# Patient Record
Sex: FEMALE | Race: WHITE | Employment: OTHER | URBAN - METROPOLITAN AREA
[De-identification: names, ages, dates, MRNs, and addresses within clinical notes are randomized per-mention and may not be internally consistent; named-entity substitution may affect disease eponyms.]

---

## 2017-02-07 ENCOUNTER — OFFICE VISIT (OUTPATIENT)
Dept: FAMILY MEDICINE CLINIC | Facility: CLINIC | Age: 72
End: 2017-02-07

## 2017-02-07 VITALS
OXYGEN SATURATION: 98 % | HEIGHT: 61 IN | DIASTOLIC BLOOD PRESSURE: 70 MMHG | SYSTOLIC BLOOD PRESSURE: 110 MMHG | BODY MASS INDEX: 33.61 KG/M2 | WEIGHT: 178 LBS | HEART RATE: 66 BPM

## 2017-02-07 DIAGNOSIS — M79.642 LEFT HAND PAIN: Primary | ICD-10-CM

## 2017-02-07 DIAGNOSIS — R19.6 HALITOSIS: ICD-10-CM

## 2017-02-07 PROBLEM — M81.0 OSTEOPOROSIS: Status: ACTIVE | Noted: 2017-02-07

## 2017-02-07 PROBLEM — I10 ESSENTIAL HYPERTENSION: Status: ACTIVE | Noted: 2017-02-07

## 2017-02-07 PROBLEM — H40.9 GLAUCOMA: Status: ACTIVE | Noted: 2017-02-07

## 2017-02-07 PROCEDURE — 99202 OFFICE O/P NEW SF 15 MIN: CPT

## 2017-02-07 RX ORDER — GABAPENTIN 300 MG/1
300 CAPSULE ORAL NIGHTLY
COMMUNITY
End: 2017-02-07

## 2017-02-07 RX ORDER — GABAPENTIN 400 MG/1
400 CAPSULE ORAL NIGHTLY
Qty: 90 CAPSULE | Refills: 1 | Status: SHIPPED | OUTPATIENT
Start: 2017-02-07 | End: 2017-05-03

## 2017-02-07 RX ORDER — METHYLPREDNISOLONE 4 MG/1
TABLET ORAL
Qty: 1 KIT | Refills: 0 | Status: SHIPPED | OUTPATIENT
Start: 2017-02-07 | End: 2017-04-11 | Stop reason: ALTCHOICE

## 2017-02-08 NOTE — PROGRESS NOTES
HPI:    Patient ID: Pao Renee is a 70year old female. Hand Pain   The pain is present in the left hand. This is a chronic problem. The current episode started more than 1 month ago. There has been no history of extremity trauma.  The proble hand: She exhibits tenderness (over 1st and 2nd metacarpals) and bony tenderness (over 1st and 2nd metacarpals). She exhibits normal range of motion. Normal strength noted. Neurological: She is alert and oriented to person, place, and time.    Skin: Skin

## 2017-04-11 ENCOUNTER — OFFICE VISIT (OUTPATIENT)
Dept: FAMILY MEDICINE CLINIC | Facility: CLINIC | Age: 72
End: 2017-04-11

## 2017-04-11 VITALS
SYSTOLIC BLOOD PRESSURE: 100 MMHG | OXYGEN SATURATION: 97 % | HEART RATE: 72 BPM | BODY MASS INDEX: 34 KG/M2 | WEIGHT: 181 LBS | DIASTOLIC BLOOD PRESSURE: 70 MMHG

## 2017-04-11 DIAGNOSIS — N30.01 ACUTE CYSTITIS WITH HEMATURIA: Primary | ICD-10-CM

## 2017-04-11 DIAGNOSIS — H40.9 GLAUCOMA OF BOTH EYES, UNSPECIFIED GLAUCOMA: ICD-10-CM

## 2017-04-11 DIAGNOSIS — Z01.89 ENCOUNTER FOR ROUTINE LABORATORY TESTING: ICD-10-CM

## 2017-04-11 DIAGNOSIS — M81.0 OSTEOPOROSIS WITHOUT CURRENT PATHOLOGICAL FRACTURE, UNSPECIFIED OSTEOPOROSIS TYPE: ICD-10-CM

## 2017-04-11 DIAGNOSIS — M54.9 CHRONIC UPPER BACK PAIN: ICD-10-CM

## 2017-04-11 DIAGNOSIS — I10 ESSENTIAL HYPERTENSION: ICD-10-CM

## 2017-04-11 DIAGNOSIS — G89.29 CHRONIC UPPER BACK PAIN: ICD-10-CM

## 2017-04-11 DIAGNOSIS — R30.0 DYSURIA: ICD-10-CM

## 2017-04-11 PROBLEM — R19.6 HALITOSIS: Status: RESOLVED | Noted: 2017-02-07 | Resolved: 2017-04-11

## 2017-04-11 PROBLEM — M79.642 LEFT HAND PAIN: Status: RESOLVED | Noted: 2017-02-07 | Resolved: 2017-04-11

## 2017-04-11 PROCEDURE — 87186 SC STD MICRODIL/AGAR DIL: CPT

## 2017-04-11 PROCEDURE — 87086 URINE CULTURE/COLONY COUNT: CPT

## 2017-04-11 PROCEDURE — 99214 OFFICE O/P EST MOD 30 MIN: CPT

## 2017-04-11 PROCEDURE — 87077 CULTURE AEROBIC IDENTIFY: CPT

## 2017-04-11 RX ORDER — OMEPRAZOLE 40 MG/1
40 CAPSULE, DELAYED RELEASE ORAL DAILY
COMMUNITY
End: 2017-05-03

## 2017-04-11 RX ORDER — ALENDRONATE SODIUM 10 MG/1
10 TABLET ORAL
Qty: 90 TABLET | Refills: 3 | Status: SHIPPED | OUTPATIENT
Start: 2017-04-11 | End: 2018-02-26

## 2017-04-11 RX ORDER — LATANOPROST 50 UG/ML
1 SOLUTION/ DROPS OPHTHALMIC NIGHTLY
Qty: 3 BOTTLE | Refills: 3 | Status: SHIPPED | OUTPATIENT
Start: 2017-04-11 | End: 2018-02-27

## 2017-04-11 RX ORDER — LISINOPRIL 30 MG/1
30 TABLET ORAL DAILY
Qty: 90 TABLET | Refills: 0 | Status: SHIPPED | OUTPATIENT
Start: 2017-04-11 | End: 2017-05-03

## 2017-04-11 RX ORDER — NAPROXEN 500 MG/1
500 TABLET ORAL 2 TIMES DAILY WITH MEALS
Qty: 180 TABLET | Refills: 1 | Status: SHIPPED | OUTPATIENT
Start: 2017-04-11 | End: 2019-09-06

## 2017-04-11 RX ORDER — CIPROFLOXACIN 250 MG/1
250 TABLET, FILM COATED ORAL 2 TIMES DAILY
Qty: 6 TABLET | Refills: 0 | Status: SHIPPED | OUTPATIENT
Start: 2017-04-11 | End: 2017-04-14

## 2017-04-11 RX ORDER — CIPROFLOXACIN 250 MG/1
250 TABLET, FILM COATED ORAL 2 TIMES DAILY
Qty: 6 TABLET | Refills: 0 | Status: SHIPPED | OUTPATIENT
Start: 2017-04-11 | End: 2017-04-11

## 2017-04-12 ENCOUNTER — TELEPHONE (OUTPATIENT)
Dept: FAMILY MEDICINE CLINIC | Facility: CLINIC | Age: 72
End: 2017-04-12

## 2017-04-12 ENCOUNTER — NURSE ONLY (OUTPATIENT)
Dept: FAMILY MEDICINE CLINIC | Facility: CLINIC | Age: 72
End: 2017-04-12

## 2017-04-12 DIAGNOSIS — Z01.89 ENCOUNTER FOR ROUTINE LABORATORY TESTING: ICD-10-CM

## 2017-04-12 PROCEDURE — 80061 LIPID PANEL: CPT

## 2017-04-12 PROCEDURE — 36415 COLL VENOUS BLD VENIPUNCTURE: CPT

## 2017-04-12 PROCEDURE — 85027 COMPLETE CBC AUTOMATED: CPT

## 2017-04-12 PROCEDURE — 80053 COMPREHEN METABOLIC PANEL: CPT

## 2017-04-12 NOTE — PROGRESS NOTES
Patient presented to clinic for blood work. Orders verified in patient's chart. Name and  verified. Patient is fasting. Blood drawn from left antecubital, tolerated well without complications.

## 2017-04-12 NOTE — PROGRESS NOTES
HPI:    Patient ID: Raleigh Anguiano is a 70year old female. Dysuria   This is a new problem. Episode onset: 1 week ago. The problem occurs every urination. The problem has been unchanged. The quality of the pain is described as burning.  The pain myalgias and back pain. Skin: Negative for rash. Neurological: Negative for dizziness, tingling, syncope, weakness, light-headedness, numbness, headaches and paresthesias. All other systems reviewed and are negative.              Current Outpatient Pr relief. Rx for Cipro given. 3. Chronic upper back pain  Pt advised to take hot showers/baths and/or to apply a heating pad to affected area to help relieve her symptoms. Rx for Naproxen given. 4. Essential hypertension  Refill for Lisinopril given.

## 2017-04-12 NOTE — TELEPHONE ENCOUNTER
Patient was seen by Dr Clay Wagoner last month and lost the RX, ok per Dr Jose Alfredo Winn to send it this time only and if she needs additional refills she will have to call Dr Clay Wagoner.

## 2017-04-24 ENCOUNTER — TELEPHONE (OUTPATIENT)
Dept: FAMILY MEDICINE CLINIC | Facility: CLINIC | Age: 72
End: 2017-04-24

## 2017-05-02 ENCOUNTER — OFFICE VISIT (OUTPATIENT)
Dept: FAMILY MEDICINE CLINIC | Facility: CLINIC | Age: 72
End: 2017-05-02

## 2017-05-02 VITALS
HEART RATE: 67 BPM | HEIGHT: 61 IN | DIASTOLIC BLOOD PRESSURE: 60 MMHG | SYSTOLIC BLOOD PRESSURE: 120 MMHG | WEIGHT: 181 LBS | BODY MASS INDEX: 34.17 KG/M2 | RESPIRATION RATE: 18 BRPM | OXYGEN SATURATION: 98 %

## 2017-05-02 DIAGNOSIS — K21.00 GASTROESOPHAGEAL REFLUX DISEASE WITH ESOPHAGITIS: ICD-10-CM

## 2017-05-02 DIAGNOSIS — E66.09 NON MORBID OBESITY DUE TO EXCESS CALORIES: ICD-10-CM

## 2017-05-02 DIAGNOSIS — E78.00 HYPERCHOLESTEREMIA: ICD-10-CM

## 2017-05-02 DIAGNOSIS — Z13.31 DEPRESSION SCREENING: ICD-10-CM

## 2017-05-02 DIAGNOSIS — Z12.31 ENCOUNTER FOR SCREENING MAMMOGRAM FOR BREAST CANCER: ICD-10-CM

## 2017-05-02 DIAGNOSIS — Z00.00 ENCOUNTER FOR MEDICARE ANNUAL WELLNESS EXAM: Primary | ICD-10-CM

## 2017-05-02 DIAGNOSIS — H40.9 GLAUCOMA OF BOTH EYES, UNSPECIFIED GLAUCOMA: ICD-10-CM

## 2017-05-02 DIAGNOSIS — G89.29 CHRONIC UPPER BACK PAIN: ICD-10-CM

## 2017-05-02 DIAGNOSIS — M54.9 CHRONIC UPPER BACK PAIN: ICD-10-CM

## 2017-05-02 DIAGNOSIS — M81.0 AGE-RELATED OSTEOPOROSIS WITHOUT CURRENT PATHOLOGICAL FRACTURE: ICD-10-CM

## 2017-05-02 DIAGNOSIS — Z78.0 POSTMENOPAUSAL: ICD-10-CM

## 2017-05-02 DIAGNOSIS — I10 ESSENTIAL HYPERTENSION: ICD-10-CM

## 2017-05-02 DIAGNOSIS — Z28.21 IMMUNIZATION NOT CARRIED OUT BECAUSE OF PATIENT REFUSAL: ICD-10-CM

## 2017-05-02 DIAGNOSIS — R44.8 PARESTHESIA OF TONGUE: ICD-10-CM

## 2017-05-02 PROBLEM — R30.0 DYSURIA: Status: RESOLVED | Noted: 2017-04-11 | Resolved: 2017-05-02

## 2017-05-02 PROBLEM — N30.01 ACUTE CYSTITIS WITH HEMATURIA: Status: RESOLVED | Noted: 2017-04-11 | Resolved: 2017-05-02

## 2017-05-02 PROCEDURE — 96160 PT-FOCUSED HLTH RISK ASSMT: CPT

## 2017-05-02 RX ORDER — TIMOLOL MALEATE 5 MG/ML
SOLUTION/ DROPS OPHTHALMIC
COMMUNITY
Start: 2017-04-18 | End: 2017-05-03

## 2017-05-02 NOTE — PROGRESS NOTES
HPI:   Alix Whittington is a 70year old female who presents for a MA (Medicare Advantage) [facility] (Once per calendar year). Pt denies any complaints at this time.        Patient Care Team: Patient Care Team:  Tadeo Patterson MD as PCP - General includes Arthritis in her father; Cancer in her mother; Diabetes in her son; Musculo-skelatal Disorder in her son. SOCIAL HISTORY:   She  reports that she has never smoked.  She has never used smokeless tobacco. She reports that she does not drink alcohol symmetrical, trachea midline, no adenopathy;  thyroid: not enlarged, symmetric, no tenderness/mass/nodules; no carotid bruit or JVD   Back:   Symmetric, no curvature, ROM normal, no CVA tenderness   Lungs:   Clear to auscultation bilaterally, respirations previously instructed. - Follow up with Ophthalmology as scheduled. Gastroesophageal reflux disease with esophagitis  -     Pt advised to avoid excess intake of spicy/acidic/caffeinated foods.  - Omeprazole 40 MG Oral Capsule Delayed Release;  Take 1 ca HOANG and f/u.      Ifeoma Chandra MD, 5/2/2017     General Health     In the past six months, have you lost more than 10 pounds without trying?: 2 - No    Has your appetite been poor?: No    How does the patient maintain a good energy level?: Other    How wo hopeless (over the last two weeks)?: Several days    PHQ-2 SCORE: 2        Advance Directives     Do you have a healthcare power of ?: No    Do you have a living will?: No     Please go to \"Cognitive Assessment\" under Medicare Assessment section Health Maintenance if applicable    Chlamydia  Annually if high risk No results found for: CHLAMYDIA No flowsheet data found.     Screening Mammogram      Mammogram Annually to 76, then as discussed Mammogram,1 Yr due on 07/17/1985 Update Health Maintenance A1C No flowsheet data found. Creat/alb ratio  Annually      LDL  Annually LDL CHOLESTEROL (mg/dL)   Date Value   04/12/2017 141*    No flowsheet data found. Dilated Eye exam  Annually No flowsheet data found. No flowsheet data found.     COPD      S

## 2017-05-03 PROBLEM — K21.00 GASTROESOPHAGEAL REFLUX DISEASE WITH ESOPHAGITIS: Status: ACTIVE | Noted: 2017-05-03

## 2017-05-03 PROBLEM — E78.00 HYPERCHOLESTEREMIA: Status: ACTIVE | Noted: 2017-05-03

## 2017-05-03 PROBLEM — Z28.21 IMMUNIZATION NOT CARRIED OUT BECAUSE OF PATIENT REFUSAL: Status: ACTIVE | Noted: 2017-05-03

## 2017-05-03 PROBLEM — Z13.31 DEPRESSION SCREENING: Status: ACTIVE | Noted: 2017-05-03

## 2017-05-03 PROBLEM — Z00.00 ENCOUNTER FOR MEDICARE ANNUAL WELLNESS EXAM: Status: ACTIVE | Noted: 2017-04-11

## 2017-05-03 PROBLEM — Z78.0 POSTMENOPAUSAL: Status: ACTIVE | Noted: 2017-05-03

## 2017-05-03 PROBLEM — Z12.31 ENCOUNTER FOR SCREENING MAMMOGRAM FOR BREAST CANCER: Status: ACTIVE | Noted: 2017-05-03

## 2017-05-03 PROBLEM — E66.09 NON MORBID OBESITY DUE TO EXCESS CALORIES: Status: ACTIVE | Noted: 2017-05-03

## 2017-05-03 PROBLEM — R44.8 PARESTHESIA OF TONGUE: Status: ACTIVE | Noted: 2017-05-03

## 2017-05-03 RX ORDER — OMEPRAZOLE 40 MG/1
40 CAPSULE, DELAYED RELEASE ORAL DAILY
Qty: 90 CAPSULE | Refills: 1 | Status: SHIPPED | OUTPATIENT
Start: 2017-05-03 | End: 2018-05-16

## 2017-05-03 RX ORDER — GABAPENTIN 400 MG/1
400 CAPSULE ORAL NIGHTLY
Qty: 90 CAPSULE | Refills: 1 | Status: SHIPPED | OUTPATIENT
Start: 2017-05-03 | End: 2018-04-13 | Stop reason: ALTCHOICE

## 2017-05-03 RX ORDER — LISINOPRIL 30 MG/1
30 TABLET ORAL DAILY
Qty: 90 TABLET | Refills: 1 | Status: SHIPPED | OUTPATIENT
Start: 2017-05-03 | End: 2018-01-17

## 2017-05-10 ENCOUNTER — MED REC SCAN ONLY (OUTPATIENT)
Dept: FAMILY MEDICINE CLINIC | Facility: CLINIC | Age: 72
End: 2017-05-10

## 2017-05-11 ENCOUNTER — TELEPHONE (OUTPATIENT)
Dept: FAMILY MEDICINE CLINIC | Facility: CLINIC | Age: 72
End: 2017-05-11

## 2017-05-18 ENCOUNTER — OFFICE VISIT (OUTPATIENT)
Dept: FAMILY MEDICINE CLINIC | Facility: CLINIC | Age: 72
End: 2017-05-18

## 2017-05-18 VITALS
WEIGHT: 183 LBS | RESPIRATION RATE: 16 BRPM | HEIGHT: 61 IN | BODY MASS INDEX: 34.55 KG/M2 | OXYGEN SATURATION: 97 % | SYSTOLIC BLOOD PRESSURE: 120 MMHG | DIASTOLIC BLOOD PRESSURE: 70 MMHG | HEART RATE: 65 BPM

## 2017-05-18 DIAGNOSIS — N30.00 ACUTE CYSTITIS WITHOUT HEMATURIA: Primary | ICD-10-CM

## 2017-05-18 DIAGNOSIS — R30.0 DYSURIA: ICD-10-CM

## 2017-05-18 PROCEDURE — 87086 URINE CULTURE/COLONY COUNT: CPT

## 2017-05-18 PROCEDURE — 81003 URINALYSIS AUTO W/O SCOPE: CPT

## 2017-05-18 PROCEDURE — 99213 OFFICE O/P EST LOW 20 MIN: CPT

## 2017-05-18 RX ORDER — SULFAMETHOXAZOLE AND TRIMETHOPRIM 800; 160 MG/1; MG/1
1 TABLET ORAL 2 TIMES DAILY
Qty: 14 TABLET | Refills: 0 | Status: SHIPPED | OUTPATIENT
Start: 2017-05-18 | End: 2017-05-25

## 2017-05-18 NOTE — PATIENT INSTRUCTIONS
Cultivo de [place] otros nombres? Cultivo y sensibilidad (“C&S”, por nowak sigla en inglés) de la orina  ¿Qué es Radha análisis?   Vivi análisis comprueba si hay bacterias en nowak orina que pudieran estar causando heath infección en patricia vías Los resultados del cultivo de orina se indican en unidades formadores de colonias por mililitro (CFU/mL). Un resultado negativo significa que no tiene heath infección. Un recuento bacteriano más alto puede indicar heath infección.   ¿Cómo se realiza rolan anális

## 2017-05-18 NOTE — PROGRESS NOTES
HPI:    Patient ID: Renaldo Camara is a 70year old female. Dysuria   This is a new problem. The current episode started yesterday. The problem occurs every urination. Progression since onset: partially improved.  The quality of the pain is descr daily Disp: 3 Bottle Rfl: 0   naproxen 500 MG Oral Tab Take 1 tablet (500 mg total) by mouth 2 (two) times daily with meals.  Disp: 180 tablet Rfl: 1   Alendronate Sodium 10 MG Oral Tab Take 1 tablet (10 mg total) by mouth every morning before breakfast. Charli Saxena

## 2017-07-28 ENCOUNTER — TELEPHONE (OUTPATIENT)
Dept: FAMILY MEDICINE CLINIC | Facility: CLINIC | Age: 72
End: 2017-07-28

## 2017-07-28 DIAGNOSIS — H40.9 GLAUCOMA OF BOTH EYES, UNSPECIFIED GLAUCOMA TYPE: Primary | ICD-10-CM

## 2017-08-28 ENCOUNTER — TELEPHONE (OUTPATIENT)
Dept: FAMILY MEDICINE CLINIC | Facility: CLINIC | Age: 72
End: 2017-08-28

## 2017-08-28 RX ORDER — METHYLPREDNISOLONE 4 MG/1
TABLET ORAL
Qty: 1 KIT | Refills: 0 | Status: SHIPPED | OUTPATIENT
Start: 2017-08-28 | End: 2017-09-26 | Stop reason: ALTCHOICE

## 2017-08-28 NOTE — TELEPHONE ENCOUNTER
Patient has been having feet pain for the past three weeks. Wants to be seen this week. I advised the patient there was no available appointments until next week Friday. She declined the appointment.

## 2017-08-29 NOTE — TELEPHONE ENCOUNTER
Per Dr Kerline Mcknight have patient start her on a medrodose madi until we can see her next week, message was left on her V/M notifying her of Dr's instructions.

## 2017-09-26 ENCOUNTER — OFFICE VISIT (OUTPATIENT)
Dept: FAMILY MEDICINE CLINIC | Facility: CLINIC | Age: 72
End: 2017-09-26

## 2017-09-26 VITALS
BODY MASS INDEX: 34 KG/M2 | SYSTOLIC BLOOD PRESSURE: 100 MMHG | HEART RATE: 72 BPM | OXYGEN SATURATION: 98 % | DIASTOLIC BLOOD PRESSURE: 70 MMHG | WEIGHT: 179 LBS

## 2017-09-26 DIAGNOSIS — M25.571 PAIN IN JOINTS OF BOTH FEET: Primary | ICD-10-CM

## 2017-09-26 DIAGNOSIS — M25.572 PAIN IN JOINTS OF BOTH FEET: Primary | ICD-10-CM

## 2017-09-26 PROCEDURE — 99212 OFFICE O/P EST SF 10 MIN: CPT

## 2017-09-26 RX ORDER — TIMOLOL MALEATE 5 MG/ML
SOLUTION/ DROPS OPHTHALMIC
COMMUNITY
Start: 2017-07-10 | End: 2019-02-12

## 2017-09-26 RX ORDER — PNEUMOCOCCAL VACCINE POLYVALENT 25; 25; 25; 25; 25; 25; 25; 25; 25; 25; 25; 25; 25; 25; 25; 25; 25; 25; 25; 25; 25; 25; 25 UG/.5ML; UG/.5ML; UG/.5ML; UG/.5ML; UG/.5ML; UG/.5ML; UG/.5ML; UG/.5ML; UG/.5ML; UG/.5ML; UG/.5ML; UG/.5ML; UG/.5ML; UG/.5ML; UG/.5ML; UG/.5ML; UG/.5ML; UG/.5ML; UG/.5ML; UG/.5ML; UG/.5ML; UG/.5ML; UG/.5ML
INJECTION, SOLUTION INTRAMUSCULAR; SUBCUTANEOUS
COMMUNITY
Start: 2017-07-15 | End: 2018-04-13 | Stop reason: ALTCHOICE

## 2017-09-27 PROBLEM — R44.8 PARESTHESIA OF TONGUE: Status: RESOLVED | Noted: 2017-05-03 | Resolved: 2017-09-27

## 2017-09-27 PROBLEM — M25.571 PAIN IN JOINTS OF BOTH FEET: Status: ACTIVE | Noted: 2017-09-27

## 2017-09-27 PROBLEM — M25.572 PAIN IN JOINTS OF BOTH FEET: Status: ACTIVE | Noted: 2017-09-27

## 2017-09-27 NOTE — PROGRESS NOTES
HPI:    Patient ID: Luis Miguel Martinez is a 67year old female. Foot Pain    The pain is present in the right toes, right foot, left foot and left toes. This is a recurrent problem. The current episode started more than 1 year ago.  The problem occur 1   gabapentin 400 MG Oral Cap Take 1 capsule (400 mg total) by mouth nightly. Disp: 90 capsule Rfl: 1   naproxen 500 MG Oral Tab Take 1 tablet (500 mg total) by mouth 2 (two) times daily with meals.  Disp: 180 tablet Rfl: 1   Alendronate Sodium 10 MG Oral

## 2017-10-19 ENCOUNTER — OFFICE VISIT (OUTPATIENT)
Dept: FAMILY MEDICINE CLINIC | Facility: CLINIC | Age: 72
End: 2017-10-19

## 2017-10-19 VITALS
DIASTOLIC BLOOD PRESSURE: 70 MMHG | WEIGHT: 179 LBS | OXYGEN SATURATION: 97 % | HEIGHT: 61 IN | RESPIRATION RATE: 18 BRPM | SYSTOLIC BLOOD PRESSURE: 128 MMHG | BODY MASS INDEX: 33.79 KG/M2 | TEMPERATURE: 98 F | HEART RATE: 69 BPM

## 2017-10-19 DIAGNOSIS — B35.3 TINEA PEDIS OF BOTH FEET: ICD-10-CM

## 2017-10-19 DIAGNOSIS — J02.9 VIRAL PHARYNGITIS: ICD-10-CM

## 2017-10-19 DIAGNOSIS — B37.2 INTERTRIGINOUS CANDIDIASIS: Primary | ICD-10-CM

## 2017-10-19 PROCEDURE — 99213 OFFICE O/P EST LOW 20 MIN: CPT

## 2017-10-19 RX ORDER — NYSTATIN 100000 U/G
1 CREAM TOPICAL 2 TIMES DAILY
Qty: 30 G | Refills: 1 | Status: SHIPPED | OUTPATIENT
Start: 2017-10-19 | End: 2018-04-13 | Stop reason: ALTCHOICE

## 2017-10-20 NOTE — PATIENT INSTRUCTIONS
Infección de la piel por Candida (adulto)  La Candida es un tipo de levadura. Crece naturalmente en la piel y en la boca. Si se sale de control, puede causar [place].  La Lizzette puede causar infecciones en la sailaja genital, los pliegues de la piel y · No use ropa ajustada. Visita de control  El salpullido desaparecerá al cabo de entre 7 y 12 días. Programe heath visita de control con nowak proveedor de atención médica si el salpullido no brantley desaparecido al cabo de 14 días.   ¿Cuándo debe buscar atención mé

## 2017-10-20 NOTE — PROGRESS NOTES
HPI:    Patient ID: Jennifer Park is a 67year old female. Sore Throat    This is a new problem. The current episode started yesterday. The problem has been gradually improving. There has been no fever. The pain is mild.  Pertinent negatives inc are negative. Current Outpatient Prescriptions:  nystatin 540606 UNIT/GM External Cream Apply 1 Application topically 2 (two) times daily.  Disp: 30 g Rfl: 1   PNEUMOVAX 23 25 MCG/0.5ML Injection Injection  Disp:  Rfl:    Timolol Maleate 0.5 % O pt.  Rx for Nystatin cream given. 3. Viral pharyngitis  Keep well hydrated. PO intake as tolerated. Tylenol/Motrin for pain/fever relief. RTC if symptoms worsen or fail to improve.         No orders of the defined types were placed in this encounter

## 2017-11-06 ENCOUNTER — NURSE ONLY (OUTPATIENT)
Dept: FAMILY MEDICINE CLINIC | Facility: CLINIC | Age: 72
End: 2017-11-06

## 2017-11-06 ENCOUNTER — TELEPHONE (OUTPATIENT)
Dept: FAMILY MEDICINE CLINIC | Facility: CLINIC | Age: 72
End: 2017-11-06

## 2017-11-06 DIAGNOSIS — Z23 NEED FOR INFLUENZA VACCINATION: Primary | ICD-10-CM

## 2017-11-06 PROCEDURE — 90653 IIV ADJUVANT VACCINE IM: CPT

## 2017-11-06 PROCEDURE — G0008 ADMIN INFLUENZA VIRUS VAC: HCPCS

## 2017-11-07 RX ORDER — LISINOPRIL 30 MG/1
30 TABLET ORAL DAILY
Qty: 90 TABLET | Refills: 0 | Status: SHIPPED | OUTPATIENT
Start: 2017-11-07 | End: 2018-01-17

## 2017-11-07 NOTE — TELEPHONE ENCOUNTER
Dr Bigg Guo is not authorizing the Lisinopril the way she wants, he is authorizing it the way she is suppose,  Rx sent in for Lisinopril 30 mg # 90.

## 2017-11-30 ENCOUNTER — TELEPHONE (OUTPATIENT)
Dept: FAMILY MEDICINE CLINIC | Facility: CLINIC | Age: 72
End: 2017-11-30

## 2017-11-30 RX ORDER — NEOMYCIN SULFATE, POLYMYXIN B SULFATE AND HYDROCORTISONE 10; 3.5; 1 MG/ML; MG/ML; [USP'U]/ML
4 SUSPENSION/ DROPS AURICULAR (OTIC) 4 TIMES DAILY
Qty: 1 BOTTLE | Refills: 0 | Status: SHIPPED | OUTPATIENT
Start: 2017-11-30 | End: 2017-12-07

## 2017-11-30 NOTE — TELEPHONE ENCOUNTER
Dr Diogo Carmona authorized ear drops for her symptoms,patient notified and was advised to call us back if not better by next week.

## 2017-11-30 NOTE — TELEPHONE ENCOUNTER
Patient is calling requesting an appointment for today for ear pain. She has tried over the counter medications but does not see or feel any relief.

## 2018-01-16 ENCOUNTER — OFFICE VISIT (OUTPATIENT)
Dept: FAMILY MEDICINE CLINIC | Facility: CLINIC | Age: 73
End: 2018-01-16

## 2018-01-16 VITALS
SYSTOLIC BLOOD PRESSURE: 100 MMHG | DIASTOLIC BLOOD PRESSURE: 70 MMHG | HEART RATE: 56 BPM | BODY MASS INDEX: 34 KG/M2 | OXYGEN SATURATION: 98 % | WEIGHT: 182 LBS

## 2018-01-16 DIAGNOSIS — H61.23 BILATERAL IMPACTED CERUMEN: ICD-10-CM

## 2018-01-16 DIAGNOSIS — R07.89 CHEST PRESSURE: ICD-10-CM

## 2018-01-16 DIAGNOSIS — J02.9 VIRAL PHARYNGITIS: Primary | ICD-10-CM

## 2018-01-16 PROCEDURE — 99214 OFFICE O/P EST MOD 30 MIN: CPT

## 2018-01-16 RX ORDER — PREDNISONE 20 MG/1
40 TABLET ORAL DAILY
Qty: 10 TABLET | Refills: 0 | Status: SHIPPED | OUTPATIENT
Start: 2018-01-16 | End: 2018-01-21

## 2018-01-17 DIAGNOSIS — J02.9 VIRAL PHARYNGITIS: ICD-10-CM

## 2018-01-17 RX ORDER — LISINOPRIL 20 MG/1
20 TABLET ORAL DAILY
Qty: 90 TABLET | Refills: 0 | Status: SHIPPED | OUTPATIENT
Start: 2018-01-17 | End: 2018-01-18

## 2018-01-17 RX ORDER — LISINOPRIL 30 MG/1
TABLET ORAL
Qty: 90 TABLET | Refills: 0 | OUTPATIENT
Start: 2018-01-17

## 2018-01-17 NOTE — PATIENT INSTRUCTIONS
Dolor En El Pecho:Causa No Determinada [Chest Pain, Uncertain Cause]    Según nowak examen de hoy, no se pudo determinar exactamente por qué siente dolor en el pecho.  Nowak afección no parece seria en rolan momento y el dolor que siente no parece provenir del c

## 2018-01-17 NOTE — PROGRESS NOTES
HPI:    Patient ID: Tiffanie Pablo is a 67year old female. Sore Throat    This is a recurrent problem. The current episode started more than 1 month ago. The problem has been waxing and waning.  Neither side of throat is experiencing more pain t voice and sore throat. Negative for congestion, drooling, ear discharge, rhinorrhea and trouble swallowing. Eyes: Negative for photophobia, discharge and visual disturbance. Respiratory: Negative for cough, shortness of breath and stridor.     Mattie Fuller Allergies   PHYSICAL EXAM:   Physical Exam   Constitutional: She is oriented to person, place, and time. She appears well-developed and well-nourished. No distress. HENT:   Right Ear: Tympanic membrane and external ear normal. Decreased hearing is noted.

## 2018-01-18 ENCOUNTER — TELEPHONE (OUTPATIENT)
Dept: FAMILY MEDICINE CLINIC | Facility: CLINIC | Age: 73
End: 2018-01-18

## 2018-01-18 RX ORDER — LISINOPRIL 20 MG/1
20 TABLET ORAL DAILY
Qty: 90 TABLET | Refills: 0 | Status: SHIPPED | OUTPATIENT
Start: 2018-01-18 | End: 2018-04-13

## 2018-01-18 NOTE — TELEPHONE ENCOUNTER
Pt called needs a refill on lisinopril. Medication was refilled yesterday and was sent to [facility] pharmacy.  Pt needs the refill sent to [address] [place] [address]

## 2018-01-18 NOTE — TELEPHONE ENCOUNTER
Lisinopril e-rx to THE [facility].

## 2018-01-22 ENCOUNTER — TELEPHONE (OUTPATIENT)
Dept: FAMILY MEDICINE CLINIC | Facility: CLINIC | Age: 73
End: 2018-01-22

## 2018-01-22 DIAGNOSIS — H40.9 UNSPECIFIED GLAUCOMA(365.9): Primary | ICD-10-CM

## 2018-01-22 NOTE — TELEPHONE ENCOUNTER
Pt called needs a referral for Dr Lesia Corral pt has an appt with the doctor tomorrow. If we could please enter referral in.

## 2018-01-25 ENCOUNTER — APPOINTMENT (OUTPATIENT)
Dept: LAB | Facility: HOSPITAL | Age: 73
End: 2018-01-25
Payer: MEDICARE

## 2018-01-25 DIAGNOSIS — R07.89 CHEST PRESSURE: ICD-10-CM

## 2018-01-25 PROCEDURE — 93005 ELECTROCARDIOGRAM TRACING: CPT

## 2018-01-25 PROCEDURE — 93010 ELECTROCARDIOGRAM REPORT: CPT

## 2018-02-14 ENCOUNTER — HOSPITAL ENCOUNTER (OUTPATIENT)
Dept: GENERAL RADIOLOGY | Facility: HOSPITAL | Age: 73
Discharge: HOME | End: 2018-02-14
Payer: MEDICARE

## 2018-02-14 ENCOUNTER — OFFICE VISIT (OUTPATIENT)
Dept: FAMILY MEDICINE CLINIC | Facility: CLINIC | Age: 73
End: 2018-02-14

## 2018-02-14 VITALS — SYSTOLIC BLOOD PRESSURE: 100 MMHG | HEART RATE: 76 BPM | DIASTOLIC BLOOD PRESSURE: 70 MMHG | OXYGEN SATURATION: 98 %

## 2018-02-14 DIAGNOSIS — M25.552 LEFT HIP PAIN: Primary | ICD-10-CM

## 2018-02-14 DIAGNOSIS — F51.04 PSYCHOPHYSIOLOGICAL INSOMNIA: ICD-10-CM

## 2018-02-14 DIAGNOSIS — M25.552 LEFT HIP PAIN: ICD-10-CM

## 2018-02-14 PROBLEM — H61.23 BILATERAL IMPACTED CERUMEN: Status: RESOLVED | Noted: 2018-01-16 | Resolved: 2018-02-14

## 2018-02-14 PROBLEM — J02.9 VIRAL PHARYNGITIS: Status: RESOLVED | Noted: 2017-10-19 | Resolved: 2018-02-14

## 2018-02-14 PROBLEM — M86.9 OSTEITIS PUBIS (HCC): Status: ACTIVE | Noted: 2018-02-14

## 2018-02-14 PROBLEM — Z00.00 ENCOUNTER FOR MEDICARE ANNUAL WELLNESS EXAM: Status: RESOLVED | Noted: 2017-04-11 | Resolved: 2018-02-14

## 2018-02-14 PROBLEM — R07.89 CHEST PRESSURE: Status: RESOLVED | Noted: 2018-01-16 | Resolved: 2018-02-14

## 2018-02-14 PROBLEM — M16.12 PRIMARY OSTEOARTHRITIS OF LEFT HIP: Status: ACTIVE | Noted: 2018-02-14

## 2018-02-14 PROBLEM — Z12.31 ENCOUNTER FOR SCREENING MAMMOGRAM FOR BREAST CANCER: Status: RESOLVED | Noted: 2017-05-03 | Resolved: 2018-02-14

## 2018-02-14 PROBLEM — Z13.31 DEPRESSION SCREENING: Status: RESOLVED | Noted: 2017-05-03 | Resolved: 2018-02-14

## 2018-02-14 PROBLEM — M16.12 OSTEOARTHRITIS OF LEFT HIP: Status: ACTIVE | Noted: 2018-02-14

## 2018-02-14 PROBLEM — M47.816 OSTEOARTHRITIS OF LUMBAR SPINE: Status: ACTIVE | Noted: 2018-02-14

## 2018-02-14 PROCEDURE — 99213 OFFICE O/P EST LOW 20 MIN: CPT

## 2018-02-14 PROCEDURE — 73502 X-RAY EXAM HIP UNI 2-3 VIEWS: CPT

## 2018-02-14 RX ORDER — OLOPATADINE HYDROCHLORIDE 1 MG/ML
SOLUTION/ DROPS OPHTHALMIC
COMMUNITY
Start: 2018-02-01 | End: 2018-04-13 | Stop reason: ALTCHOICE

## 2018-02-14 RX ORDER — METHYLPREDNISOLONE 4 MG/1
TABLET ORAL
Qty: 1 KIT | Refills: 0 | Status: SHIPPED | OUTPATIENT
Start: 2018-02-14 | End: 2018-04-13 | Stop reason: ALTCHOICE

## 2018-02-14 RX ORDER — ZOLPIDEM TARTRATE 5 MG/1
5 TABLET ORAL NIGHTLY PRN
Qty: 30 TABLET | Refills: 0 | Status: SHIPPED | OUTPATIENT
Start: 2018-02-14 | End: 2019-02-12 | Stop reason: ALTCHOICE

## 2018-02-14 NOTE — PROGRESS NOTES
HPI:    Patient ID: Jolynn Avalos is a 67year old female. She also c/o trouble falling and staying asleep for the past few months. She mentions that after falling asleep she wakes up every 1.5-2 hours w/o any apparent inciting incident.  She de Current Outpatient Prescriptions:  methylPREDNISolone (MEDROL) 4 MG Oral Tablet Therapy Pack As directed. Disp: 1 kit Rfl: 0   Zolpidem Tartrate (AMBIEN) 5 MG Oral Tab Take 1 tablet (5 mg total) by mouth nightly as needed for Sleep.  Disp: 30 tablet Rfl XR and f/u as appropriate. 2. Psychophysiological insomnia  Clinical course, prognosis, and treatment options of disease process discussed with pt. Proper sleep hygiene reviewed and discussed with pt. Rx for Ambien given.     RTC if symptoms worsen or

## 2018-02-15 NOTE — PATIENT INSTRUCTIONS
El Insomnio [Insomnia]  El insomnio se refiere a dificultad para dormirse, mantenerse dormido o ambos.  El insomnio tiene muchas causas, incluyendo ansiedad, estrés, depresión, dolor crónico, ciclos de sueño fuera de equilibrio debido a trabajo de turno n Seguimiento con nowak médico o de acuerdo a las indicaciones de nuestro personal si siente que el insomnio no responde a las indicaciones descritas arriba.   Busque Prontamente Atención Médica  si algo de lo siguiente ocurre:  · Inquietud extrema o irritabilid

## 2018-02-16 ENCOUNTER — TELEPHONE (OUTPATIENT)
Dept: FAMILY MEDICINE CLINIC | Facility: CLINIC | Age: 73
End: 2018-02-16

## 2018-02-16 NOTE — TELEPHONE ENCOUNTER
Results were reviewed by Dr Rony Carrion and were normal, message left on patient's v/m informing her about the results.

## 2018-02-22 ENCOUNTER — HOSPITAL ENCOUNTER (OUTPATIENT)
Dept: BONE DENSITY | Facility: HOSPITAL | Age: 73
Discharge: HOME | End: 2018-02-22
Payer: MEDICARE

## 2018-02-22 ENCOUNTER — HOSPITAL ENCOUNTER (OUTPATIENT)
Dept: MAMMOGRAPHY | Facility: HOSPITAL | Age: 73
Discharge: HOME | End: 2018-02-22
Payer: MEDICARE

## 2018-02-22 DIAGNOSIS — Z12.31 ENCOUNTER FOR SCREENING MAMMOGRAM FOR BREAST CANCER: ICD-10-CM

## 2018-02-22 DIAGNOSIS — Z78.0 POSTMENOPAUSAL: ICD-10-CM

## 2018-02-22 PROCEDURE — 77067 SCR MAMMO BI INCL CAD: CPT

## 2018-02-22 PROCEDURE — 77080 DXA BONE DENSITY AXIAL: CPT

## 2018-02-26 ENCOUNTER — TELEPHONE (OUTPATIENT)
Dept: FAMILY MEDICINE CLINIC | Facility: CLINIC | Age: 73
End: 2018-02-26

## 2018-02-26 DIAGNOSIS — M81.0 OSTEOPOROSIS WITHOUT CURRENT PATHOLOGICAL FRACTURE, UNSPECIFIED OSTEOPOROSIS TYPE: ICD-10-CM

## 2018-02-26 RX ORDER — ALENDRONATE SODIUM 10 MG/1
10 TABLET ORAL
Qty: 90 TABLET | Refills: 3 | Status: SHIPPED | OUTPATIENT
Start: 2018-02-26 | End: 2018-04-13

## 2018-02-26 NOTE — TELEPHONE ENCOUNTER
----- Message from Malaika Beebe MD sent at 2/24/2018 12:04 PM CST -----  No changes to current medications; ok to file.

## 2018-02-27 DIAGNOSIS — H40.9 GLAUCOMA OF BOTH EYES: ICD-10-CM

## 2018-02-28 RX ORDER — LATANOPROST 50 UG/ML
SOLUTION/ DROPS OPHTHALMIC
Qty: 3 BOTTLE | Refills: 2 | Status: SHIPPED | OUTPATIENT
Start: 2018-02-28 | End: 2019-02-12

## 2018-03-22 ENCOUNTER — TELEPHONE (OUTPATIENT)
Dept: FAMILY MEDICINE CLINIC | Facility: CLINIC | Age: 73
End: 2018-03-22

## 2018-03-22 NOTE — TELEPHONE ENCOUNTER
Patient outreach: Called and left message. Pt is due for colonoscopy screening, wanted to confirm if the pt has had a colonoscopy in the last 5-10 years depending.

## 2018-04-13 ENCOUNTER — OFFICE VISIT (OUTPATIENT)
Dept: FAMILY MEDICINE CLINIC | Facility: CLINIC | Age: 73
End: 2018-04-13

## 2018-04-13 VITALS
SYSTOLIC BLOOD PRESSURE: 120 MMHG | DIASTOLIC BLOOD PRESSURE: 82 MMHG | RESPIRATION RATE: 18 BRPM | HEIGHT: 61 IN | HEART RATE: 57 BPM | WEIGHT: 177 LBS | OXYGEN SATURATION: 95 % | BODY MASS INDEX: 33.42 KG/M2

## 2018-04-13 DIAGNOSIS — Z13.31 DEPRESSION SCREENING: ICD-10-CM

## 2018-04-13 DIAGNOSIS — R53.83 FATIGUE, UNSPECIFIED TYPE: ICD-10-CM

## 2018-04-13 DIAGNOSIS — Z78.0 POSTMENOPAUSAL: ICD-10-CM

## 2018-04-13 DIAGNOSIS — G89.29 CHRONIC UPPER BACK PAIN: ICD-10-CM

## 2018-04-13 DIAGNOSIS — Z00.00 ENCOUNTER FOR MEDICARE ANNUAL WELLNESS EXAM: Primary | ICD-10-CM

## 2018-04-13 DIAGNOSIS — Z11.59 NEED FOR HEPATITIS C SCREENING TEST: ICD-10-CM

## 2018-04-13 DIAGNOSIS — Z28.21 IMMUNIZATION NOT CARRIED OUT BECAUSE OF PATIENT REFUSAL: ICD-10-CM

## 2018-04-13 DIAGNOSIS — M47.816 SPONDYLOSIS OF LUMBAR REGION WITHOUT MYELOPATHY OR RADICULOPATHY: ICD-10-CM

## 2018-04-13 DIAGNOSIS — I10 ESSENTIAL HYPERTENSION: ICD-10-CM

## 2018-04-13 DIAGNOSIS — E66.09 NON MORBID OBESITY DUE TO EXCESS CALORIES: ICD-10-CM

## 2018-04-13 DIAGNOSIS — F51.04 PSYCHOPHYSIOLOGICAL INSOMNIA: ICD-10-CM

## 2018-04-13 DIAGNOSIS — G89.29 CHRONIC RIGHT-SIDED HEADACHES: ICD-10-CM

## 2018-04-13 DIAGNOSIS — M81.0 AGE-RELATED OSTEOPOROSIS WITHOUT CURRENT PATHOLOGICAL FRACTURE: ICD-10-CM

## 2018-04-13 DIAGNOSIS — E78.00 HYPERCHOLESTEREMIA: ICD-10-CM

## 2018-04-13 DIAGNOSIS — M54.9 CHRONIC UPPER BACK PAIN: ICD-10-CM

## 2018-04-13 DIAGNOSIS — K21.00 GASTROESOPHAGEAL REFLUX DISEASE WITH ESOPHAGITIS: ICD-10-CM

## 2018-04-13 DIAGNOSIS — M86.9 OSTEITIS PUBIS (HCC): ICD-10-CM

## 2018-04-13 DIAGNOSIS — H40.9 GLAUCOMA OF BOTH EYES, UNSPECIFIED GLAUCOMA TYPE: ICD-10-CM

## 2018-04-13 DIAGNOSIS — R51.9 CHRONIC RIGHT-SIDED HEADACHES: ICD-10-CM

## 2018-04-13 DIAGNOSIS — M15.9 PRIMARY OSTEOARTHRITIS INVOLVING MULTIPLE JOINTS: ICD-10-CM

## 2018-04-13 PROBLEM — M15.0 PRIMARY OSTEOARTHRITIS INVOLVING MULTIPLE JOINTS: Status: ACTIVE | Noted: 2018-02-14

## 2018-04-13 PROBLEM — M25.552 LEFT HIP PAIN: Status: RESOLVED | Noted: 2018-02-14 | Resolved: 2018-04-13

## 2018-04-13 PROBLEM — B37.2 INTERTRIGINOUS CANDIDIASIS: Status: RESOLVED | Noted: 2017-10-19 | Resolved: 2018-04-13

## 2018-04-13 PROBLEM — B35.3 TINEA PEDIS OF BOTH FEET: Status: RESOLVED | Noted: 2017-10-19 | Resolved: 2018-04-13

## 2018-04-13 PROCEDURE — 85027 COMPLETE CBC AUTOMATED: CPT

## 2018-04-13 PROCEDURE — 81001 URINALYSIS AUTO W/SCOPE: CPT

## 2018-04-13 PROCEDURE — 80061 LIPID PANEL: CPT

## 2018-04-13 PROCEDURE — 36415 COLL VENOUS BLD VENIPUNCTURE: CPT

## 2018-04-13 PROCEDURE — G0439 PPPS, SUBSEQ VISIT: HCPCS

## 2018-04-13 PROCEDURE — 80053 COMPREHEN METABOLIC PANEL: CPT

## 2018-04-13 PROCEDURE — 84443 ASSAY THYROID STIM HORMONE: CPT

## 2018-04-13 PROCEDURE — 96160 PT-FOCUSED HLTH RISK ASSMT: CPT

## 2018-04-13 PROCEDURE — 86803 HEPATITIS C AB TEST: CPT

## 2018-04-13 RX ORDER — LISINOPRIL 20 MG/1
20 TABLET ORAL DAILY
Qty: 90 TABLET | Refills: 0 | Status: SHIPPED | OUTPATIENT
Start: 2018-04-13 | End: 2018-04-13

## 2018-04-13 RX ORDER — ALENDRONATE SODIUM 10 MG/1
10 TABLET ORAL
Qty: 90 TABLET | Refills: 3 | Status: SHIPPED | OUTPATIENT
Start: 2018-04-13 | End: 2019-02-12

## 2018-04-13 RX ORDER — ALENDRONATE SODIUM 10 MG/1
10 TABLET ORAL
Qty: 90 TABLET | Refills: 3 | Status: SHIPPED | OUTPATIENT
Start: 2018-04-13 | End: 2018-04-13

## 2018-04-13 RX ORDER — LISINOPRIL 20 MG/1
20 TABLET ORAL DAILY
Qty: 90 TABLET | Refills: 0 | Status: SHIPPED | OUTPATIENT
Start: 2018-04-13 | End: 2018-04-25

## 2018-04-14 PROBLEM — Z11.59 NEED FOR HEPATITIS C SCREENING TEST: Status: ACTIVE | Noted: 2018-04-14

## 2018-04-14 NOTE — PATIENT INSTRUCTIONS
Hillary Berger Mastache's SCREENING SCHEDULE   Tests on this list are recommended by your physician but may not be covered, or covered at this frequency, by your insurer. Please check with your insurance carrier before scheduling to verify coverage.    PRE every 10 years- more often if abnormal Colonoscopy,10 Years due on 07/17/1995 Update Health Maintenance if applicable    Flex Sigmoidoscopy Screen  Covered every 5 years No results found for this or any previous visit. No flowsheet data found.      Fecal Oc Pneumococcal 23 (Pneumovax)  Covered Once after 65 No orders found for this or any previous visit. Please get once after your 65th birthday    Hepatitis B for Moderate/High Risk       No orders found for this or any previous visit.  Medium/high risk factors

## 2018-04-14 NOTE — PROGRESS NOTES
HPI:   Dez Arora is a 67year old female who presents for a MA (Medicare Advantage) [facility] (Once per calendar year). Pt c/o intermittent R sided HA for a few years that over the past few months have became more frequent.  She also has sta problems based on screening of functional status.    Vision Problems? : Yes                Depression Screening (PHQ-2/PHQ-9): Over the LAST 2 WEEKS   Little interest or pleasure in doing things (over the last two weeks)?: Several days  Feeling down, depres Last Cholesterol Labs:     Lab Results  Component Value Date   CHOLEST 206 (H) 04/13/2018   HDL 61 04/13/2018    (H) 04/13/2018   TRIG 94 04/13/2018          Last Chemistry Labs:     Lab Results  Component Value Date   AST 25 04/13/2018   ALT 16 0 blurred vision  HEENT: denies nasal congestion, sinus pain or ST  LUNGS: denies shortness of breath with exertion  CARDIOVASCULAR: denies chest pain on exertion  GI: denies abdominal pain, + heartburn  : denies dysuria, vaginal discharge or itching, no c Clear to auscultation bilaterally, respirations unlabored   Heart:  Regular rate and rhythm, S1 and S2 normal, no murmur, rub, or gallop   Abdomen:   Soft, non-tender, bowel sounds active all four quadrants,  no masses, no organomegaly   Pelvic: Deferred Oral Tab; Take 1 tablet (20 mg total) by mouth daily. Gastroesophageal reflux disease with esophagitis  -     Pt advised to avoid excess intake of spicy/acidic/caffeinated foods.  - Continue Omeprazole as previously instructed.     Glaucoma of both eyes, No  Has your appetite been poor?: No  How does the patient maintain a good energy level?: Daily Walks  How would you describe your daily physical activity?: Moderate  How would you describe your current health state?: Fair  How do you maintain positive men 02/22/2019 Update Health Maintenance if applicable     Immunizations (Update Immunization Activity if applicable)     Influenza  Covered Annually 11/6/2017 Please get every year    Pneumococcal 13 (Prevnar)  Covered Once after 65 No vaccine history found P

## 2018-04-17 NOTE — TELEPHONE ENCOUNTER
Pt notified of above test results and Dr. Neela Omalley recommendations. Pt verbalized understanding.  Denied further questions

## 2018-04-17 NOTE — TELEPHONE ENCOUNTER
----- Message from Shahid Erwin RN sent at 4/17/2018  9:54 AM CDT -----  Nils Villagomez, this patient speaks patient as well. All results were normal, pretty much.  Lipid panel just slightly elevated, so he just recommends diet/exercise and omega 3 fish oil pil

## 2018-04-18 ENCOUNTER — TELEPHONE (OUTPATIENT)
Dept: FAMILY MEDICINE CLINIC | Facility: CLINIC | Age: 73
End: 2018-04-18

## 2018-04-18 NOTE — TELEPHONE ENCOUNTER
----- Message from Riccardo Zepeda RN sent at 4/18/2018  2:44 PM CDT -----  I sent this to Jurgen Chris yesterday, but I don't see an encounter; I'm not sure if she got to contact her.    Results were within normal, lipids were slightly elevated; but no need

## 2018-04-25 DIAGNOSIS — I10 ESSENTIAL HYPERTENSION: ICD-10-CM

## 2018-04-25 RX ORDER — LISINOPRIL 20 MG/1
TABLET ORAL
Qty: 90 TABLET | Refills: 0 | Status: SHIPPED | OUTPATIENT
Start: 2018-04-25 | End: 2018-09-02

## 2018-05-16 ENCOUNTER — OFFICE VISIT (OUTPATIENT)
Dept: FAMILY MEDICINE CLINIC | Facility: CLINIC | Age: 73
End: 2018-05-16

## 2018-05-16 VITALS
TEMPERATURE: 98 F | HEART RATE: 68 BPM | WEIGHT: 176 LBS | SYSTOLIC BLOOD PRESSURE: 122 MMHG | DIASTOLIC BLOOD PRESSURE: 70 MMHG | BODY MASS INDEX: 33 KG/M2 | RESPIRATION RATE: 16 BRPM | OXYGEN SATURATION: 96 %

## 2018-05-16 DIAGNOSIS — N95.0 POSTMENOPAUSAL VAGINAL BLEEDING: ICD-10-CM

## 2018-05-16 DIAGNOSIS — N93.9 VAGINAL SPOTTING: ICD-10-CM

## 2018-05-16 DIAGNOSIS — J02.9 VIRAL PHARYNGITIS: Primary | ICD-10-CM

## 2018-05-16 DIAGNOSIS — R05.9 COUGH: ICD-10-CM

## 2018-05-16 PROCEDURE — 99213 OFFICE O/P EST LOW 20 MIN: CPT | Performed by: FAMILY MEDICINE

## 2018-05-16 RX ORDER — GUAIFENESIN 600 MG
600 TABLET, EXTENDED RELEASE 12 HR ORAL 2 TIMES DAILY
Qty: 10 TABLET | Refills: 0 | Status: SHIPPED | OUTPATIENT
Start: 2018-05-16 | End: 2018-08-14 | Stop reason: ALTCHOICE

## 2018-05-16 RX ORDER — FAMOTIDINE 20 MG/1
20 TABLET ORAL 2 TIMES DAILY PRN
Qty: 60 TABLET | Refills: 0 | Status: SHIPPED | OUTPATIENT
Start: 2018-05-16 | End: 2019-02-12

## 2018-05-21 ENCOUNTER — HOSPITAL ENCOUNTER (OUTPATIENT)
Dept: CT IMAGING | Facility: HOSPITAL | Age: 73
Discharge: HOME | End: 2018-05-21
Payer: MEDICARE

## 2018-05-21 ENCOUNTER — HOSPITAL ENCOUNTER (OUTPATIENT)
Dept: ULTRASOUND IMAGING | Facility: HOSPITAL | Age: 73
Discharge: HOME | End: 2018-05-21
Attending: FAMILY MEDICINE
Payer: MEDICARE

## 2018-05-21 DIAGNOSIS — N93.9 VAGINAL SPOTTING: ICD-10-CM

## 2018-05-21 DIAGNOSIS — N95.0 POSTMENOPAUSAL VAGINAL BLEEDING: ICD-10-CM

## 2018-05-21 DIAGNOSIS — R51.9 CHRONIC RIGHT-SIDED HEADACHES: ICD-10-CM

## 2018-05-21 DIAGNOSIS — G89.29 CHRONIC RIGHT-SIDED HEADACHES: ICD-10-CM

## 2018-05-21 PROCEDURE — 76856 US EXAM PELVIC COMPLETE: CPT | Performed by: FAMILY MEDICINE

## 2018-05-21 PROCEDURE — 76830 TRANSVAGINAL US NON-OB: CPT | Performed by: FAMILY MEDICINE

## 2018-05-21 PROCEDURE — 70450 CT HEAD/BRAIN W/O DYE: CPT

## 2018-06-08 ENCOUNTER — TELEPHONE (OUTPATIENT)
Dept: FAMILY MEDICINE CLINIC | Facility: CLINIC | Age: 73
End: 2018-06-08

## 2018-06-14 ENCOUNTER — TELEPHONE (OUTPATIENT)
Dept: FAMILY MEDICINE CLINIC | Facility: CLINIC | Age: 73
End: 2018-06-14

## 2018-06-14 NOTE — TELEPHONE ENCOUNTER
Calling for pelvic ultrasound. Patient states that today she had vaginal bleeding like if she had her period.  She is a bit concerned

## 2018-06-18 NOTE — TELEPHONE ENCOUNTER
Per Dr Dom Mahmood, u/s was normal and a referral was already placed and all she has to do is make an appointment to see the gyne. Message left on patient's voice mail to call ma back.

## 2018-07-09 LAB
BACTERIA UR QL AUTO: NEGATIVE /HPF
BASOPHILS # BLD: 0 K/UL (ref 0–0.2)
BASOPHILS NFR BLD: 1 %
BILIRUB UR QL: NEGATIVE
CLARITY UR: CLEAR
COLOR UR: YELLOW
EOSINOPHIL # BLD: 0.3 K/UL (ref 0–0.7)
EOSINOPHIL NFR BLD: 6 %
ERYTHROCYTE [DISTWIDTH] IN BLOOD BY AUTOMATED COUNT: 13.7 % (ref 11–15)
GLUCOSE UR-MCNC: NEGATIVE MG/DL
HCT VFR BLD AUTO: 41.4 % (ref 35–48)
HGB BLD-MCNC: 13.9 G/DL (ref 12–16)
KETONES UR-MCNC: NEGATIVE MG/DL
LYMPHOCYTES # BLD: 1.9 K/UL (ref 1–4)
LYMPHOCYTES NFR BLD: 33 %
MCH RBC QN AUTO: 32.4 PG (ref 27–32)
MCHC RBC AUTO-ENTMCNC: 33.7 G/DL (ref 32–37)
MCV RBC AUTO: 96.3 FL (ref 80–100)
MONOCYTES # BLD: 0.7 K/UL (ref 0–1)
MONOCYTES NFR BLD: 12 %
NEUTROPHILS # BLD AUTO: 2.7 K/UL (ref 1.8–7.7)
NEUTROPHILS NFR BLD: 48 %
NITRITE UR QL STRIP.AUTO: NEGATIVE
PH UR: 6 [PH] (ref 5–8)
PLATELET # BLD AUTO: 164 K/UL (ref 140–400)
PMV BLD AUTO: 9.3 FL (ref 7.4–10.3)
PROT UR-MCNC: NEGATIVE MG/DL
RBC # BLD AUTO: 4.3 M/UL (ref 3.7–5.4)
RBC #/AREA URNS AUTO: 9 /HPF
SP GR UR STRIP: 1.01 (ref 1–1.03)
UROBILINOGEN UR STRIP-ACNC: <2
VIT C UR-MCNC: 40 MG/DL
WBC # BLD AUTO: 5.7 K/UL (ref 4–11)
WBC #/AREA URNS AUTO: 10 /HPF

## 2018-07-09 PROCEDURE — 87088 URINE BACTERIA CULTURE: CPT | Performed by: EMERGENCY MEDICINE

## 2018-07-09 PROCEDURE — 85025 COMPLETE CBC W/AUTO DIFF WBC: CPT

## 2018-07-09 PROCEDURE — 85025 COMPLETE CBC W/AUTO DIFF WBC: CPT | Performed by: EMERGENCY MEDICINE

## 2018-07-09 PROCEDURE — 87186 SC STD MICRODIL/AGAR DIL: CPT | Performed by: EMERGENCY MEDICINE

## 2018-07-09 PROCEDURE — 36415 COLL VENOUS BLD VENIPUNCTURE: CPT

## 2018-07-09 PROCEDURE — 80048 BASIC METABOLIC PNL TOTAL CA: CPT

## 2018-07-09 PROCEDURE — 87086 URINE CULTURE/COLONY COUNT: CPT | Performed by: EMERGENCY MEDICINE

## 2018-07-09 PROCEDURE — 99283 EMERGENCY DEPT VISIT LOW MDM: CPT

## 2018-07-09 PROCEDURE — 80048 BASIC METABOLIC PNL TOTAL CA: CPT | Performed by: EMERGENCY MEDICINE

## 2018-07-09 PROCEDURE — 81001 URINALYSIS AUTO W/SCOPE: CPT

## 2018-07-09 PROCEDURE — 87086 URINE CULTURE/COLONY COUNT: CPT

## 2018-07-09 PROCEDURE — 81001 URINALYSIS AUTO W/SCOPE: CPT | Performed by: EMERGENCY MEDICINE

## 2018-07-10 ENCOUNTER — HOSPITAL ENCOUNTER (EMERGENCY)
Facility: HOSPITAL | Age: 73
Discharge: HOME | End: 2018-07-10
Attending: EMERGENCY MEDICINE
Payer: MEDICARE

## 2018-07-10 ENCOUNTER — TELEPHONE (OUTPATIENT)
Dept: FAMILY MEDICINE CLINIC | Facility: CLINIC | Age: 73
End: 2018-07-10

## 2018-07-10 VITALS
DIASTOLIC BLOOD PRESSURE: 84 MMHG | SYSTOLIC BLOOD PRESSURE: 157 MMHG | HEIGHT: 62 IN | OXYGEN SATURATION: 95 % | TEMPERATURE: 99 F | RESPIRATION RATE: 18 BRPM | HEART RATE: 79 BPM | WEIGHT: 180 LBS | BODY MASS INDEX: 33.13 KG/M2

## 2018-07-10 DIAGNOSIS — N95.0 POST-MENOPAUSAL BLEEDING: Primary | ICD-10-CM

## 2018-07-10 DIAGNOSIS — N93.9 VAGINAL BLEEDING, ABNORMAL: Primary | ICD-10-CM

## 2018-07-10 LAB
ANION GAP SERPL CALC-SCNC: 7 MMOL/L (ref 0–18)
BUN SERPL-MCNC: 14 MG/DL (ref 8–20)
BUN/CREAT SERPL: 18.9 (ref 10–20)
CALCIUM SERPL-MCNC: 9.7 MG/DL (ref 8.5–10.5)
CHLORIDE SERPL-SCNC: 105 MMOL/L (ref 95–110)
CO2 SERPL-SCNC: 25 MMOL/L (ref 22–32)
CREAT SERPL-MCNC: 0.74 MG/DL (ref 0.5–1.5)
GLUCOSE SERPL-MCNC: 110 MG/DL (ref 70–99)
OSMOLALITY UR CALC.SUM OF ELEC: 285 MOSM/KG (ref 275–295)
POTASSIUM SERPL-SCNC: 3.7 MMOL/L (ref 3.3–5.1)
SODIUM SERPL-SCNC: 137 MMOL/L (ref 136–144)

## 2018-07-10 NOTE — TELEPHONE ENCOUNTER
Patient Is calling to get a referral for Dr. Dinora Childress in [facility] gyn specialist. She went to e.r yesterday and was referred to him. She has an appointment scheduled for this Thursday.

## 2018-07-12 ENCOUNTER — OFFICE VISIT (OUTPATIENT)
Dept: OBGYN CLINIC | Facility: CLINIC | Age: 73
End: 2018-07-12

## 2018-07-12 VITALS — SYSTOLIC BLOOD PRESSURE: 126 MMHG | BODY MASS INDEX: 33 KG/M2 | DIASTOLIC BLOOD PRESSURE: 80 MMHG | WEIGHT: 180.19 LBS

## 2018-07-12 DIAGNOSIS — N95.0 POSTMENOPAUSAL BLEEDING: ICD-10-CM

## 2018-07-12 DIAGNOSIS — Z01.812 PRE-PROCEDURAL LABORATORY EXAMINATION: Primary | ICD-10-CM

## 2018-07-12 DIAGNOSIS — N84.1 MUCOUS POLYP OF CERVIX: ICD-10-CM

## 2018-07-12 LAB
CONTROL LINE PRESENT WITH A CLEAR BACKGROUND (YES/NO): YES YES/NO
KIT LOT #: NORMAL NUMERIC
PREGNANCY TEST, URINE: NEGATIVE

## 2018-07-12 PROCEDURE — 58100 BIOPSY OF UTERUS LINING: CPT | Performed by: OBSTETRICS & GYNECOLOGY

## 2018-07-12 PROCEDURE — 81025 URINE PREGNANCY TEST: CPT | Performed by: OBSTETRICS & GYNECOLOGY

## 2018-07-12 NOTE — ED PROVIDER NOTES
Patient Seen in: [facility] AND [facility] Emergency Department    History   Patient presents with:  Back Pain (musculoskeletal)  Abdominal Pain  Vaginal Bleeding    Stated Complaint: Abd/Flank pain w/Vaginal Bleeding x5 hours    HPI    66 yo postmenopausal fema Normal range of motion. Neck supple. Cardiovascular: Normal rate, regular rhythm, normal heart sounds and intact distal pulses. Pulmonary/Chest: Effort normal and breath sounds normal.   Abdominal: Soft.  Bowel sounds are normal. She exhibits no disten these tests on the individual orders.    RAINBOW DRAW BLUE   RAINBOW DRAW LAVENDER   RAINBOW DRAW DARK GREEN   RAINBOW DRAW LIGHT GREEN   RAINBOW DRAW GOLD   RAINBOW DRAW LAVENDER TALL (BNP)       ED Course as of Jul 12 1042  -------------------------------

## 2018-07-12 NOTE — PROCEDURES
Polypectomy     Consent signed. Procedure discussed with the patient in detail including indication, risks, benefits, alternatives and complications. Pelvic Exam Findings:  Cervix: polyp    Procedure:  Speculum placed in the vagina.   Betadine wash of c

## 2018-07-16 ENCOUNTER — TELEPHONE (OUTPATIENT)
Dept: OBGYN CLINIC | Facility: CLINIC | Age: 73
End: 2018-07-16

## 2018-07-16 NOTE — TELEPHONE ENCOUNTER
[place] phone line  #[phone] used to translate phone call. Message left on pt's voicemail that endocervical polp and EMBx both read as benign. Pt can call the office at [phone] with any questions or concerns.

## 2018-07-16 NOTE — TELEPHONE ENCOUNTER
----- Message from Isaac Mancilla MD sent at 7/14/2018 10:41 AM CDT -----  Endocervical polyp and EMBx are both read as benign. Notify patient.

## 2018-07-20 ENCOUNTER — TELEPHONE (OUTPATIENT)
Dept: OBGYN CLINIC | Facility: CLINIC | Age: 73
End: 2018-07-20

## 2018-07-20 NOTE — TELEPHONE ENCOUNTER
Patient informed and scheduled appt with Dr. Victor Hugo Aguayo for follow up appt. Patient agreed and verbalized understanding.

## 2018-08-01 ENCOUNTER — OFFICE VISIT (OUTPATIENT)
Dept: OBGYN CLINIC | Facility: CLINIC | Age: 73
End: 2018-08-01

## 2018-08-01 VITALS — WEIGHT: 180 LBS | BODY MASS INDEX: 33 KG/M2 | DIASTOLIC BLOOD PRESSURE: 74 MMHG | SYSTOLIC BLOOD PRESSURE: 119 MMHG

## 2018-08-01 DIAGNOSIS — N95.0 PMB (POSTMENOPAUSAL BLEEDING): Primary | ICD-10-CM

## 2018-08-01 PROCEDURE — 99213 OFFICE O/P EST LOW 20 MIN: CPT | Performed by: OBSTETRICS & GYNECOLOGY

## 2018-08-01 RX ORDER — CEPHALEXIN 250 MG/1
CAPSULE ORAL
COMMUNITY
Start: 2018-07-14 | End: 2018-08-01

## 2018-08-01 NOTE — PROGRESS NOTES
HPI:    Patient ID: Renaldo Camara is a 68year old female. HPI  Patient reports no further vaginal bleeding after endocervical polypectomy and EMBx. Pathology reviewed and all benign.   Patient instructed to contact office with any future vagin has a normal mood and affect. Her behavior is normal. Judgment and thought content normal.   Nursing note and vitals reviewed. ASSESSMENT/PLAN:   Pmb (postmenopausal bleeding)  (primary encounter diagnosis)  All questions answered.   Bleeding pr

## 2018-08-14 ENCOUNTER — OFFICE VISIT (OUTPATIENT)
Dept: FAMILY MEDICINE CLINIC | Facility: CLINIC | Age: 73
End: 2018-08-14

## 2018-08-14 VITALS
WEIGHT: 183 LBS | HEART RATE: 65 BPM | OXYGEN SATURATION: 98 % | DIASTOLIC BLOOD PRESSURE: 70 MMHG | SYSTOLIC BLOOD PRESSURE: 126 MMHG | BODY MASS INDEX: 33 KG/M2

## 2018-08-14 DIAGNOSIS — R07.9 CHEST PAIN, UNSPECIFIED TYPE: Primary | ICD-10-CM

## 2018-08-14 PROBLEM — Z13.31 DEPRESSION SCREENING: Status: RESOLVED | Noted: 2017-05-03 | Resolved: 2018-08-14

## 2018-08-14 PROBLEM — Z00.00 ENCOUNTER FOR MEDICARE ANNUAL WELLNESS EXAM: Status: RESOLVED | Noted: 2017-04-11 | Resolved: 2018-08-14

## 2018-08-14 PROBLEM — Z11.59 NEED FOR HEPATITIS C SCREENING TEST: Status: RESOLVED | Noted: 2018-04-14 | Resolved: 2018-08-14

## 2018-08-14 PROCEDURE — 99213 OFFICE O/P EST LOW 20 MIN: CPT

## 2018-08-14 NOTE — PROGRESS NOTES
HPI:    Patient ID: Meg Jasso is a 68year old female. Chest Pain    This is a new problem. Episode onset: 2 weeks ago after holding a sneeze in. The onset quality is sudden. The problem occurs constantly.  The problem has been resolved (las headaches. All other systems reviewed and are negative. Current Outpatient Prescriptions:  famoTIDine 20 MG Oral Tab Take 1 tablet (20 mg total) by mouth 2 (two) times daily as needed for Heartburn (acid reflux).  Disp: 60 tablet Rfl: 0   JAVON Visit:  No prescriptions requested or ordered in this encounter    Imaging & Referrals:  None       #[id number]

## 2018-08-15 NOTE — PATIENT INSTRUCTIONS
Dolor En El Pecho, No Cardíaco [Non-Cardiac Chest Pain]    Según nowak visita de hoy, no se pudo determinar exactamente por qué siente dolor en el pecho. Nowak afección no parece seria y el dolor que siente no parece provenir del corazón.  Sin embargo, en [employer]

## 2018-09-02 DIAGNOSIS — I10 ESSENTIAL HYPERTENSION: ICD-10-CM

## 2018-09-06 RX ORDER — LISINOPRIL 20 MG/1
TABLET ORAL
Qty: 90 TABLET | Refills: 0 | Status: SHIPPED | OUTPATIENT
Start: 2018-09-06 | End: 2019-02-11

## 2018-10-02 ENCOUNTER — OFFICE VISIT (OUTPATIENT)
Dept: FAMILY MEDICINE CLINIC | Facility: CLINIC | Age: 73
End: 2018-10-02

## 2018-10-02 VITALS
WEIGHT: 180 LBS | OXYGEN SATURATION: 98 % | TEMPERATURE: 98 F | BODY MASS INDEX: 33 KG/M2 | HEART RATE: 62 BPM | SYSTOLIC BLOOD PRESSURE: 108 MMHG | DIASTOLIC BLOOD PRESSURE: 70 MMHG

## 2018-10-02 DIAGNOSIS — N76.0 ACUTE VAGINITIS: ICD-10-CM

## 2018-10-02 DIAGNOSIS — H93.13 TINNITUS OF BOTH EARS: Primary | ICD-10-CM

## 2018-10-02 DIAGNOSIS — Z23 NEED FOR INFLUENZA VACCINATION: ICD-10-CM

## 2018-10-02 PROBLEM — R53.83 FATIGUE: Status: RESOLVED | Noted: 2018-04-13 | Resolved: 2018-10-02

## 2018-10-02 PROBLEM — R07.9 CHEST PAIN: Status: RESOLVED | Noted: 2018-08-14 | Resolved: 2018-10-02

## 2018-10-02 PROCEDURE — 90653 IIV ADJUVANT VACCINE IM: CPT

## 2018-10-02 PROCEDURE — 99214 OFFICE O/P EST MOD 30 MIN: CPT

## 2018-10-02 PROCEDURE — G0008 ADMIN INFLUENZA VIRUS VAC: HCPCS

## 2018-10-02 RX ORDER — FLUCONAZOLE 150 MG/1
150 TABLET ORAL ONCE
Qty: 1 TABLET | Refills: 0 | Status: SHIPPED | OUTPATIENT
Start: 2018-10-02 | End: 2018-10-02

## 2018-10-03 NOTE — PROGRESS NOTES
HPI:    Patient ID: Meg Jasso is a 68year old female. Ear Problem    There is pain in both ears. This is a chronic (hearing loud motorlike noises on her ears) problem. Episode onset: few months ago. The problem occurs constantly.  The probl Genitourinary: Positive for vaginal discharge. Negative for dysuria, flank pain, frequency, hematuria and urgency. Musculoskeletal: Negative for arthralgias, back pain, joint pain and neck pain. Skin: Negative for rash.    Neurological: Negative for d with pt. Will refer to Dr. Virgie Peacock (ENT) for evaluation. 2. Acute vaginitis  Clinical course, prognosis, and treatment options of disease process discussed with pt. Rx for Diflucan given.     3. Need for influenza vaccination  - FLU VACCINE ADJUVANT IM

## 2018-10-03 NOTE — PATIENT INSTRUCTIONS
Vacunas antigripales para adultos  La gripe (abreviada flu en inglés) es heath enfermedad causada por un virus de fácil transmisión. La vacuna antigripal (flu shot) ofrece protección contra la gripe.  Lo ideal es que usted se ponga esta vacuna todos los Ramiro Hay muchas cepas (tipos) de virus de la gripe. Los expertos en medicina predicen cuáles cepas tienen mayores probabilidades de infectar a la gente todos los Los silva; las vacunas antigripales se elaboran a partir de estas cepas.  Cuando le ponen heath vacuna anti · Personas que viven en la misma casa (incluso niños) que alguien que está en un radha de alto riesgo  Tipos de vacuna contra la gripe  La vacuna antigripal está disponible en forma inyectable.  Suresh proveedor de [facility] determinará cuál tipo es el ad

## 2018-10-08 DIAGNOSIS — H40.9 GLAUCOMA OF BOTH EYES: ICD-10-CM

## 2018-10-15 ENCOUNTER — TELEPHONE (OUTPATIENT)
Dept: FAMILY MEDICINE CLINIC | Facility: CLINIC | Age: 73
End: 2018-10-15

## 2018-10-15 NOTE — TELEPHONE ENCOUNTER
Patient is calling stating she has been having a cough ever since she got the flu vaccine. She has tried over the counter medications but nothing seems to be working.  She now states she is having yellow-green phlegm and wants to know if doctor can prescrib

## 2018-10-17 RX ORDER — LATANOPROST 50 UG/ML
SOLUTION/ DROPS OPHTHALMIC
Qty: 3 BOTTLE | Refills: 2 | OUTPATIENT
Start: 2018-10-17

## 2018-11-06 ENCOUNTER — OFFICE VISIT (OUTPATIENT)
Dept: AUDIOLOGY | Facility: CLINIC | Age: 73
End: 2018-11-06

## 2018-11-06 ENCOUNTER — OFFICE VISIT (OUTPATIENT)
Dept: OTOLARYNGOLOGY | Facility: CLINIC | Age: 73
End: 2018-11-06

## 2018-11-06 VITALS
SYSTOLIC BLOOD PRESSURE: 102 MMHG | DIASTOLIC BLOOD PRESSURE: 60 MMHG | HEIGHT: 61 IN | WEIGHT: 180 LBS | TEMPERATURE: 98 F | BODY MASS INDEX: 33.99 KG/M2

## 2018-11-06 DIAGNOSIS — H93.13 RINGING IN EAR, BILATERAL: Primary | ICD-10-CM

## 2018-11-06 DIAGNOSIS — H93.19 TINNITUS, UNSPECIFIED LATERALITY: Primary | ICD-10-CM

## 2018-11-06 PROBLEM — H90.3 SENSORINEURAL HEARING LOSS (SNHL) OF BOTH EARS: Status: ACTIVE | Noted: 2018-11-06

## 2018-11-06 PROCEDURE — 99203 OFFICE O/P NEW LOW 30 MIN: CPT | Performed by: OTOLARYNGOLOGY

## 2018-11-06 PROCEDURE — 92567 TYMPANOMETRY: CPT | Performed by: AUDIOLOGIST

## 2018-11-06 PROCEDURE — 92557 COMPREHENSIVE HEARING TEST: CPT | Performed by: AUDIOLOGIST

## 2018-11-06 RX ORDER — FLUCONAZOLE 150 MG/1
TABLET ORAL
COMMUNITY
Start: 2018-10-02 | End: 2019-02-12 | Stop reason: ALTCHOICE

## 2018-11-06 NOTE — PATIENT INSTRUCTIONS
How Hearing Aids Can Help You    If you’re losing your hearing, it can be frustrating: But, hearing aids can help you hear what Darliss Cheadle been missing. Not everyone who has hearing loss needs hearing aids.  But if your hearing loss is keeping you from commun © 2169-5448 [employer] [address]. [address] [facility] [place]. All rights reserved. This information is not intended as a substitute for professional medical care. Always follow your healthcare professional's instructions.

## 2018-11-06 NOTE — PROGRESS NOTES
Tejas Ramos is a 68year old female. Patient presents with:  Ringing In Ear: on and off ringing og both ears for 7 months    HPI:   She has been experiencing a ringing sound in both of her ears for the last year. This is a continuous problem. Facial features - Normal. Eyebrows - Normal. Skull - Normal.   Oral/Oropharynx Normal Lips - Normal, Tonsils - Normal, Tongue - Normal    Nasal Normal External nose - Normal. Nasal septum - Normal, Turbinates - Normal   Neurological Normal Memory - Normal.

## 2018-11-06 NOTE — PROGRESS NOTES
AUDIOGRAM     Jennifer Park was referred for testing by Nola Schmitz V for bilateral tinnitus  7/17/1945  [id number]        Otoscopic inspection: right ear no cerumen; left ear no cerumen.        Tests/Procedures  Patient was tested via

## 2018-12-06 ENCOUNTER — TELEPHONE (OUTPATIENT)
Dept: FAMILY MEDICINE CLINIC | Facility: CLINIC | Age: 73
End: 2018-12-06

## 2018-12-06 DIAGNOSIS — H40.1133 PRIMARY OPEN-ANGLE GLAUCOMA, BILATERAL, SEVERE STAGE: Primary | ICD-10-CM

## 2019-02-11 ENCOUNTER — OFFICE VISIT (OUTPATIENT)
Dept: FAMILY MEDICINE CLINIC | Facility: CLINIC | Age: 74
End: 2019-02-11
Payer: MEDICARE

## 2019-02-11 VITALS
HEART RATE: 57 BPM | BODY MASS INDEX: 34.17 KG/M2 | HEIGHT: 61 IN | SYSTOLIC BLOOD PRESSURE: 100 MMHG | WEIGHT: 181 LBS | OXYGEN SATURATION: 98 % | DIASTOLIC BLOOD PRESSURE: 60 MMHG

## 2019-02-11 DIAGNOSIS — E66.09 NON MORBID OBESITY DUE TO EXCESS CALORIES: ICD-10-CM

## 2019-02-11 DIAGNOSIS — K63.89 MELANOSIS COLI: ICD-10-CM

## 2019-02-11 DIAGNOSIS — R51.9 CHRONIC RIGHT-SIDED HEADACHES: ICD-10-CM

## 2019-02-11 DIAGNOSIS — E78.00 HYPERCHOLESTEREMIA: ICD-10-CM

## 2019-02-11 DIAGNOSIS — M54.9 CHRONIC UPPER BACK PAIN: ICD-10-CM

## 2019-02-11 DIAGNOSIS — M15.9 PRIMARY OSTEOARTHRITIS INVOLVING MULTIPLE JOINTS: ICD-10-CM

## 2019-02-11 DIAGNOSIS — K57.30 SIGMOID DIVERTICULOSIS: ICD-10-CM

## 2019-02-11 DIAGNOSIS — K64.4 EXTERNAL HEMORRHOIDS: ICD-10-CM

## 2019-02-11 DIAGNOSIS — H90.3 SENSORINEURAL HEARING LOSS (SNHL) OF BOTH EARS: ICD-10-CM

## 2019-02-11 DIAGNOSIS — I10 ESSENTIAL HYPERTENSION: ICD-10-CM

## 2019-02-11 DIAGNOSIS — H40.1133 PRIMARY OPEN ANGLE GLAUCOMA (POAG) OF BOTH EYES, SEVERE STAGE: ICD-10-CM

## 2019-02-11 DIAGNOSIS — B37.2 INTERTRIGINOUS CANDIDIASIS: ICD-10-CM

## 2019-02-11 DIAGNOSIS — Z13.31 DEPRESSION SCREENING: ICD-10-CM

## 2019-02-11 DIAGNOSIS — R44.8 PARESTHESIA OF TONGUE: ICD-10-CM

## 2019-02-11 DIAGNOSIS — M47.816 SPONDYLOSIS OF LUMBAR REGION WITHOUT MYELOPATHY OR RADICULOPATHY: ICD-10-CM

## 2019-02-11 DIAGNOSIS — H93.13 TINNITUS OF BOTH EARS: ICD-10-CM

## 2019-02-11 DIAGNOSIS — Q43.8 REDUNDANT COLON: ICD-10-CM

## 2019-02-11 DIAGNOSIS — M86.9 OSTEITIS PUBIS (HCC): ICD-10-CM

## 2019-02-11 DIAGNOSIS — N95.0 POSTMENOPAUSAL BLEEDING: ICD-10-CM

## 2019-02-11 DIAGNOSIS — G89.29 CHRONIC RIGHT-SIDED HEADACHES: ICD-10-CM

## 2019-02-11 DIAGNOSIS — M81.0 AGE-RELATED OSTEOPOROSIS WITHOUT CURRENT PATHOLOGICAL FRACTURE: ICD-10-CM

## 2019-02-11 DIAGNOSIS — K44.9 HIATAL HERNIA: ICD-10-CM

## 2019-02-11 DIAGNOSIS — Z12.31 ENCOUNTER FOR SCREENING MAMMOGRAM FOR BREAST CANCER: ICD-10-CM

## 2019-02-11 DIAGNOSIS — G89.29 CHRONIC UPPER BACK PAIN: ICD-10-CM

## 2019-02-11 DIAGNOSIS — Z78.0 POSTMENOPAUSAL: ICD-10-CM

## 2019-02-11 DIAGNOSIS — Z00.00 ENCOUNTER FOR MEDICARE ANNUAL WELLNESS EXAM: Primary | ICD-10-CM

## 2019-02-11 DIAGNOSIS — K21.00 GASTROESOPHAGEAL REFLUX DISEASE WITH ESOPHAGITIS: ICD-10-CM

## 2019-02-11 PROBLEM — N76.0 ACUTE VAGINITIS: Status: RESOLVED | Noted: 2018-10-02 | Resolved: 2019-02-11

## 2019-02-11 PROCEDURE — G0439 PPPS, SUBSEQ VISIT: HCPCS

## 2019-02-11 PROCEDURE — 96160 PT-FOCUSED HLTH RISK ASSMT: CPT

## 2019-02-11 PROCEDURE — 99397 PER PM REEVAL EST PAT 65+ YR: CPT

## 2019-02-11 RX ORDER — LISINOPRIL 20 MG/1
20 TABLET ORAL DAILY
Qty: 90 TABLET | Refills: 0 | Status: SHIPPED | OUTPATIENT
Start: 2019-02-11 | End: 2019-06-13

## 2019-02-11 RX ORDER — NYSTATIN 100000 U/G
1 OINTMENT TOPICAL 2 TIMES DAILY
Qty: 30 G | Refills: 0 | Status: SHIPPED | OUTPATIENT
Start: 2019-02-11 | End: 2019-06-26

## 2019-02-12 PROBLEM — H40.1130 PRIMARY OPEN ANGLE GLAUCOMA (POAG) OF BOTH EYES: Status: ACTIVE | Noted: 2017-02-07

## 2019-02-12 PROBLEM — F51.04 PSYCHOPHYSIOLOGICAL INSOMNIA: Status: RESOLVED | Noted: 2018-02-14 | Resolved: 2019-02-12

## 2019-02-12 PROBLEM — N95.0 POSTMENOPAUSAL BLEEDING: Status: ACTIVE | Noted: 2019-02-12

## 2019-02-12 PROBLEM — Z23 NEED FOR INFLUENZA VACCINATION: Status: RESOLVED | Noted: 2018-10-02 | Resolved: 2019-02-12

## 2019-02-12 PROBLEM — H40.1133 PRIMARY OPEN ANGLE GLAUCOMA (POAG) OF BOTH EYES, SEVERE STAGE: Status: ACTIVE | Noted: 2017-02-07

## 2019-02-12 LAB
ALBUMIN/GLOBULIN RATIO: 1.7 (CALC) (ref 1–2.5)
ALBUMIN: 4.2 G/DL (ref 3.6–5.1)
ALKALINE PHOSPHATASE: 47 U/L (ref 33–130)
ALT: 13 U/L (ref 6–29)
APPEARANCE: CLEAR
AST: 20 U/L (ref 10–35)
BILIRUBIN, TOTAL: 0.9 MG/DL (ref 0.2–1.2)
BILIRUBIN: NEGATIVE
BUN: 19 MG/DL (ref 7–25)
CALCIUM: 9.6 MG/DL (ref 8.6–10.4)
CARBON DIOXIDE: 27 MMOL/L (ref 20–32)
CHLORIDE: 107 MMOL/L (ref 98–110)
CHOL/HDLC RATIO: 3.9 (CALC)
CHOLESTEROL, TOTAL: 223 MG/DL
CREATININE: 0.76 MG/DL (ref 0.6–0.93)
EGFR IF AFRICN AM: 90 ML/MIN/1.73M2
EGFR IF NONAFRICN AM: 78 ML/MIN/1.73M2
GLOBULIN: 2.5 G/DL (CALC) (ref 1.9–3.7)
GLUCOSE: 94 MG/DL (ref 65–99)
GLUCOSE: NEGATIVE
HDL CHOLESTEROL: 57 MG/DL
HEMATOCRIT: 44 % (ref 35–45)
HEMOGLOBIN: 14.7 G/DL (ref 11.7–15.5)
KETONES: NEGATIVE
LDL-CHOLESTEROL: 141 MG/DL (CALC)
LEUKOCYTE ESTERASE: NEGATIVE
MCH: 31.6 PG (ref 27–33)
MCHC: 33.4 G/DL (ref 32–36)
MCV: 94.6 FL (ref 80–100)
MPV: 12.1 FL (ref 7.5–12.5)
NITRITE: NEGATIVE
NON-HDL CHOLESTEROL: 166 MG/DL (CALC)
OCCULT BLOOD: NEGATIVE
PH: 7 (ref 5–8)
PLATELET COUNT: 199 THOUSAND/UL (ref 140–400)
POTASSIUM: 5 MMOL/L (ref 3.5–5.3)
PROTEIN, TOTAL: 6.7 G/DL (ref 6.1–8.1)
RDW: 12.2 % (ref 11–15)
RED BLOOD CELL COUNT: 4.65 MILLION/UL (ref 3.8–5.1)
SODIUM: 141 MMOL/L (ref 135–146)
SPECIFIC GRAVITY: 1.02 (ref 1–1.03)
TRIGLYCERIDES: 126 MG/DL
WHITE BLOOD CELL COUNT: 4.8 THOUSAND/UL (ref 3.8–10.8)

## 2019-02-12 RX ORDER — FAMOTIDINE 20 MG/1
20 TABLET ORAL 2 TIMES DAILY PRN
Qty: 60 TABLET | Refills: 0 | COMMUNITY
Start: 2019-02-12 | End: 2019-06-26

## 2019-02-12 RX ORDER — LATANOPROST 50 UG/ML
1 SOLUTION/ DROPS OPHTHALMIC NIGHTLY
Qty: 3 BOTTLE | Refills: 2 | COMMUNITY
Start: 2019-02-12

## 2019-02-12 RX ORDER — GABAPENTIN 400 MG/1
400 CAPSULE ORAL NIGHTLY
Qty: 90 CAPSULE | Refills: 1 | COMMUNITY
Start: 2019-02-12 | End: 2019-06-26

## 2019-02-12 RX ORDER — ALENDRONATE SODIUM 10 MG/1
10 TABLET ORAL
Qty: 90 TABLET | Refills: 3 | Status: SHIPPED | OUTPATIENT
Start: 2019-02-12 | End: 2020-02-26

## 2019-02-12 RX ORDER — TIMOLOL MALEATE 5 MG/ML
SOLUTION/ DROPS OPHTHALMIC
Refills: 0 | COMMUNITY
Start: 2019-02-12

## 2019-02-12 NOTE — PATIENT INSTRUCTIONS
America Ashford's SCREENING SCHEDULE   Tests on this list are recommended by your physician but may not be covered, or covered at this frequency, by your insurer. Please check with your insurance carrier before scheduling to verify coverage.    PRE 10 years- more often if abnormal Colonoscopy due on 12/11/2020 Update [facility] if applicable    Flex Sigmoidoscopy Screen  Covered every 5 years No results found for this or any previous visit. No flowsheet data found.      Fecal Occult Blood   Co (Pneumovax)  Covered Once after 65 No orders found for this or any previous visit. Please get once after your 65th birthday    Hepatitis B for Moderate/High Risk       No orders found for this or any previous visit.  Medium/high risk factors:   End-stage re

## 2019-02-12 NOTE — PROGRESS NOTES
HPI:   Perla Solomon is a 68year old female who presents for a MA (Medicare Advantage) [facility] (Once per calendar year). Pt denies any acute complaints at this time.       Fall/Risk Assessment   She has been screened for Falls and is low risk wellness exam     Chronic upper back pain     Hypercholesteremia     Postmenopausal     Depression screening     Encounter for screening mammogram for breast cancer     Non morbid obesity due to excess calories     BMI 34.0-34.9,adult     Immunization not Ophthalmic Solution Place 1 drop into both eyes nightly. lisinopril 20 MG Oral Tab Take 1 tablet (20 mg total) by mouth daily. nystatin 075473 UNIT/GM External Ointment Apply 1 Application topically 2 (two) times daily.    naproxen 500 MG Oral Tab Take PM  Entry User:  Edwige Betancourt MD  Screening Method:  Finger Rub  Finger Rub Result:  Pass            Visual Acuity  Right Eye Visual Acuity: Corrected Right Eye Chart Acuity: 20/40   Left Eye Visual Acuity: Corrected Left Eye Chart Acuity: 20/40 visit:    Encounter for Medicare annual wellness exam  -  Pt reassured and all questions answered.   -  Age/sex specific preventive measures/immunizations reviewed and discussed with pt.  - CBC, PLATELET; NO DIFFERENTIAL  -     URINALYSIS, ROUTINE    Hyperc Elevation) technique reviewed and discussed with pt.  - NSAIDs PRN recommended for pain relief. Intertriginous candidiasis  -     Proper skin care reviewed and discussed with pt.  - nystatin [id number] UNIT/GM External Ointment;  Apply 1 Application topicall Date Value   02/11/2019 94          Cardiovascular Disease Screening     LDL Annually LDL-CHOLESTEROL (mg/dL (calc))   Date Value   02/11/2019 141 (H)        EKG - w/ Initial Preventative Physical Exam only, or if medically necessary Electrocardiogram da same house as a HepB virus carrier   Homosexual men   Illicit injectable drug abusers     Tetanus Toxoid  Only covered with a cut with metal- TD and TDaP Not covered by Medicare Part B No vaccine history found This may be covered with your prescription jairon

## 2019-02-13 ENCOUNTER — TELEPHONE (OUTPATIENT)
Dept: FAMILY MEDICINE CLINIC | Facility: CLINIC | Age: 74
End: 2019-02-13

## 2019-02-13 DIAGNOSIS — E78.00 HYPERCHOLESTEREMIA: Primary | ICD-10-CM

## 2019-02-13 DIAGNOSIS — E78.00 HYPERCHOLESTEREMIA: ICD-10-CM

## 2019-02-13 RX ORDER — SIMVASTATIN 20 MG
20 TABLET ORAL NIGHTLY
Qty: 90 TABLET | Refills: 0 | Status: SHIPPED | OUTPATIENT
Start: 2019-02-13 | End: 2019-02-13

## 2019-02-13 RX ORDER — SIMVASTATIN 20 MG
20 TABLET ORAL NIGHTLY
Qty: 90 TABLET | Refills: 0 | Status: SHIPPED | OUTPATIENT
Start: 2019-02-13 | End: 2019-03-26

## 2019-02-13 NOTE — TELEPHONE ENCOUNTER
Pt returned call,  verified, results below given pt requested medication be switched to [address] , med sent to [facility]

## 2019-02-13 NOTE — TELEPHONE ENCOUNTER
----- Message from Selina Hodgson sent at 2/13/2019  9:46 AM CST -----      ----- Message -----  From: Jose Manuel Lou MD  Sent: 2/13/2019   1:05 AM  To: Jessy 8  Staff    Start Simvastatin 20mg PO QHS, medication e-prescribed; needs repeat BW (

## 2019-03-06 DIAGNOSIS — B37.2 INTERTRIGINOUS CANDIDIASIS: ICD-10-CM

## 2019-03-08 RX ORDER — NYSTATIN 100000 U/G
OINTMENT TOPICAL
Qty: 30 G | Refills: 0 | OUTPATIENT
Start: 2019-03-08

## 2019-04-16 DIAGNOSIS — I10 ESSENTIAL HYPERTENSION: ICD-10-CM

## 2019-04-16 RX ORDER — LISINOPRIL 20 MG/1
TABLET ORAL
Qty: 90 TABLET | Refills: 0 | OUTPATIENT
Start: 2019-04-16

## 2019-05-20 DIAGNOSIS — I10 ESSENTIAL HYPERTENSION: ICD-10-CM

## 2019-05-21 RX ORDER — LISINOPRIL 20 MG/1
TABLET ORAL
Qty: 90 TABLET | Refills: 0 | OUTPATIENT
Start: 2019-05-21

## 2019-05-28 DIAGNOSIS — I10 ESSENTIAL HYPERTENSION: ICD-10-CM

## 2019-05-29 RX ORDER — LISINOPRIL 20 MG/1
TABLET ORAL
Qty: 90 TABLET | Refills: 0 | OUTPATIENT
Start: 2019-05-29

## 2019-06-13 ENCOUNTER — TELEPHONE (OUTPATIENT)
Dept: FAMILY MEDICINE CLINIC | Facility: CLINIC | Age: 74
End: 2019-06-13

## 2019-06-13 DIAGNOSIS — I10 ESSENTIAL HYPERTENSION: ICD-10-CM

## 2019-06-13 RX ORDER — LISINOPRIL 20 MG/1
20 TABLET ORAL DAILY
Qty: 10 TABLET | Refills: 0 | Status: SHIPPED | OUTPATIENT
Start: 2019-06-13 | End: 2019-06-26

## 2019-06-13 NOTE — TELEPHONE ENCOUNTER
Patient called again requesting a refills for her blood pressure medication.  Patient is aware her pharmacy called several time requesting this refill, but it was denied by Dr. Anne Cabrera because patient hasn't been seeing since 02/11/2019 and she need it to f/u

## 2019-06-14 PROBLEM — H26.9 CATARACT: Status: ACTIVE | Noted: 2019-06-13

## 2019-06-18 DIAGNOSIS — E78.00 HYPERCHOLESTEREMIA: ICD-10-CM

## 2019-06-19 PROBLEM — H26.9 CATARACT OF BOTH EYES: Status: ACTIVE | Noted: 2019-06-13

## 2019-06-19 PROBLEM — Z00.00 ENCOUNTER FOR MEDICARE ANNUAL WELLNESS EXAM: Status: RESOLVED | Noted: 2017-04-11 | Resolved: 2019-06-19

## 2019-06-19 PROBLEM — Z13.31 DEPRESSION SCREENING: Status: RESOLVED | Noted: 2017-05-03 | Resolved: 2019-06-19

## 2019-06-19 PROBLEM — H10.13 ALLERGIC CONJUNCTIVITIS OF BOTH EYES: Status: ACTIVE | Noted: 2019-06-13

## 2019-06-19 PROBLEM — H04.123 DRY EYE SYNDROME OF BOTH EYES: Status: ACTIVE | Noted: 2019-06-13

## 2019-06-19 RX ORDER — SIMVASTATIN 20 MG
TABLET ORAL
Qty: 90 TABLET | Refills: 0 | OUTPATIENT
Start: 2019-06-19

## 2019-06-26 ENCOUNTER — OFFICE VISIT (OUTPATIENT)
Dept: FAMILY MEDICINE CLINIC | Facility: CLINIC | Age: 74
End: 2019-06-26
Payer: MEDICARE

## 2019-06-26 VITALS
RESPIRATION RATE: 12 BRPM | HEIGHT: 61 IN | HEART RATE: 66 BPM | WEIGHT: 178 LBS | SYSTOLIC BLOOD PRESSURE: 120 MMHG | BODY MASS INDEX: 33.61 KG/M2 | OXYGEN SATURATION: 100 % | DIASTOLIC BLOOD PRESSURE: 72 MMHG

## 2019-06-26 DIAGNOSIS — B37.3 VULVOVAGINAL CANDIDIASIS: ICD-10-CM

## 2019-06-26 DIAGNOSIS — R30.0 DYSURIA: ICD-10-CM

## 2019-06-26 DIAGNOSIS — E78.00 HYPERCHOLESTEREMIA: ICD-10-CM

## 2019-06-26 DIAGNOSIS — I10 ESSENTIAL HYPERTENSION: Primary | ICD-10-CM

## 2019-06-26 DIAGNOSIS — B37.2 INTERTRIGINOUS CANDIDIASIS: ICD-10-CM

## 2019-06-26 PROCEDURE — 36415 COLL VENOUS BLD VENIPUNCTURE: CPT

## 2019-06-26 PROCEDURE — 99214 OFFICE O/P EST MOD 30 MIN: CPT

## 2019-06-26 PROCEDURE — 81003 URINALYSIS AUTO W/O SCOPE: CPT

## 2019-06-26 RX ORDER — NYSTATIN 100000 U/G
1 OINTMENT TOPICAL 2 TIMES DAILY
Qty: 30 G | Refills: 0 | Status: SHIPPED | OUTPATIENT
Start: 2019-06-26

## 2019-06-26 RX ORDER — FLUCONAZOLE 150 MG/1
150 TABLET ORAL ONCE
Qty: 1 TABLET | Refills: 1 | Status: SHIPPED | OUTPATIENT
Start: 2019-06-26 | End: 2019-06-26

## 2019-06-26 RX ORDER — LISINOPRIL 20 MG/1
20 TABLET ORAL DAILY
Qty: 90 TABLET | Refills: 0 | Status: SHIPPED | OUTPATIENT
Start: 2019-06-26 | End: 2019-08-28

## 2019-06-28 ENCOUNTER — TELEPHONE (OUTPATIENT)
Dept: FAMILY MEDICINE CLINIC | Facility: CLINIC | Age: 74
End: 2019-06-28

## 2019-06-28 DIAGNOSIS — E78.00 HYPERCHOLESTEREMIA: ICD-10-CM

## 2019-06-28 RX ORDER — SIMVASTATIN 20 MG
20 TABLET ORAL NIGHTLY
Qty: 90 TABLET | Refills: 0 | Status: SHIPPED | OUTPATIENT
Start: 2019-06-28 | End: 2019-08-30

## 2019-06-28 NOTE — TELEPHONE ENCOUNTER
Pt called requesting lab results from labs drawn on 06/26/2019. Informed that labs are pending MD review. She is requesting a return call after review. Please leave a voicemail if she doesn't answer.

## 2019-06-28 NOTE — TELEPHONE ENCOUNTER
Message left regarding her results, she is to continue the same dose for cholesterol, she is to have labs in 3 months again (fasting) to recheck levels.

## 2019-06-29 DIAGNOSIS — E78.00 HYPERCHOLESTEREMIA: Primary | ICD-10-CM

## 2019-06-29 PROBLEM — N95.0 POSTMENOPAUSAL BLEEDING: Status: RESOLVED | Noted: 2019-02-12 | Resolved: 2019-06-29

## 2019-06-29 PROBLEM — E66.09 CLASS 1 OBESITY DUE TO EXCESS CALORIES WITH SERIOUS COMORBIDITY AND BODY MASS INDEX (BMI) OF 33.0 TO 33.9 IN ADULT: Status: ACTIVE | Noted: 2017-05-03

## 2019-06-29 PROBLEM — B37.31 VULVOVAGINAL CANDIDIASIS: Status: ACTIVE | Noted: 2019-06-29

## 2019-06-29 PROBLEM — B37.3 VULVOVAGINAL CANDIDIASIS: Status: ACTIVE | Noted: 2019-06-29

## 2019-06-29 PROBLEM — E66.811 CLASS 1 OBESITY DUE TO EXCESS CALORIES WITH SERIOUS COMORBIDITY AND BODY MASS INDEX (BMI) OF 33.0 TO 33.9 IN ADULT: Status: ACTIVE | Noted: 2017-05-03

## 2019-06-30 NOTE — PATIENT INSTRUCTIONS
Factores de riesgo para la presión arterial alexi  Los factores de riesgo lo hacen más susceptible a desarrollar heath enfermedad o afección. ¿Sabe cuáles son patricia factores de riesgo para la presión arterial alexi?  Aunque usted no puede hacer nada acerca de c

## 2019-06-30 NOTE — PROGRESS NOTES
HPI:    Patient ID: Kimberly Monge is a 68year old female. Hypertension   This is a chronic problem. The current episode started more than 1 year ago. Progression since onset: stable. The problem is controlled.  Pertinent negatives include no an Neurological: Negative for dizziness, syncope, light-headedness and headaches. All other systems reviewed and are negative. Current Outpatient Medications:  lisinopril 20 MG Oral Tab Take 1 tablet (20 mg total) by mouth daily.  Disp: 90 tabl discussed with pt. Rx for Diflucan and Nystatin [id number] unit/gm ointment given. 5. Hypercholesteremia  Will obtain CMP and lipid panel and f/u as appropriate. RTC if symptoms worsen or fail to improve and in 3 months for f/u of HLD and HTN.       Orde

## 2019-07-26 ENCOUNTER — OFFICE VISIT (OUTPATIENT)
Dept: OBGYN CLINIC | Facility: CLINIC | Age: 74
End: 2019-07-26
Payer: MEDICARE

## 2019-07-26 VITALS
WEIGHT: 181 LBS | DIASTOLIC BLOOD PRESSURE: 62 MMHG | BODY MASS INDEX: 34.17 KG/M2 | HEIGHT: 61 IN | SYSTOLIC BLOOD PRESSURE: 112 MMHG

## 2019-07-26 DIAGNOSIS — R10.2 PELVIC PAIN IN FEMALE: Primary | ICD-10-CM

## 2019-07-26 DIAGNOSIS — Z01.419 ENCOUNTER FOR GYNECOLOGICAL EXAMINATION WITHOUT ABNORMAL FINDING: ICD-10-CM

## 2019-07-26 PROCEDURE — 99397 PER PM REEVAL EST PAT 65+ YR: CPT | Performed by: OBSTETRICS & GYNECOLOGY

## 2019-07-26 NOTE — PROGRESS NOTES
HPI:    Patient ID: Ryne Thurman is a 76year old female. Patient here for routine exam.  Reports pelvic Pain. Desires pelvic U/S. Has mammogram order from PCP. Patient has multiple areas of Seborrheic Keratosis and she desires treatment. Bowel sounds are normal. She exhibits no mass. There is no tenderness. Genitourinary: Vagina normal and uterus normal. No vaginal discharge found. Genitourinary Comments: Cervix:  No CMT. No lesions. Adnexa:  No adnexal masses. NT.    Musculoskelet

## 2019-08-28 DIAGNOSIS — I10 ESSENTIAL HYPERTENSION: ICD-10-CM

## 2019-08-30 DIAGNOSIS — E78.00 HYPERCHOLESTEREMIA: ICD-10-CM

## 2019-09-03 ENCOUNTER — TELEPHONE (OUTPATIENT)
Dept: FAMILY MEDICINE CLINIC | Facility: CLINIC | Age: 74
End: 2019-09-03

## 2019-09-03 DIAGNOSIS — I10 ESSENTIAL HYPERTENSION: ICD-10-CM

## 2019-09-03 RX ORDER — LISINOPRIL 20 MG/1
20 TABLET ORAL DAILY
Qty: 90 TABLET | Refills: 0 | Status: SHIPPED | OUTPATIENT
Start: 2019-09-03 | End: 2019-11-06

## 2019-09-03 RX ORDER — LISINOPRIL 20 MG/1
TABLET ORAL
Qty: 30 TABLET | Refills: 0 | Status: SHIPPED | OUTPATIENT
Start: 2019-09-03 | End: 2019-09-03

## 2019-09-03 RX ORDER — SIMVASTATIN 20 MG
TABLET ORAL
Qty: 90 TABLET | Refills: 0 | Status: SHIPPED | OUTPATIENT
Start: 2019-09-03 | End: 2020-01-07

## 2019-09-03 NOTE — TELEPHONE ENCOUNTER
Refill sent for qty of 90, patient claims she never got a Rx from [facility] back in June.

## 2019-09-06 ENCOUNTER — OFFICE VISIT (OUTPATIENT)
Dept: FAMILY MEDICINE CLINIC | Facility: CLINIC | Age: 74
End: 2019-09-06
Payer: MEDICARE

## 2019-09-06 VITALS
WEIGHT: 180 LBS | TEMPERATURE: 98 F | DIASTOLIC BLOOD PRESSURE: 64 MMHG | SYSTOLIC BLOOD PRESSURE: 98 MMHG | HEART RATE: 64 BPM | OXYGEN SATURATION: 96 % | BODY MASS INDEX: 34 KG/M2

## 2019-09-06 DIAGNOSIS — S80.211A ABRASION OF RIGHT KNEE, INITIAL ENCOUNTER: ICD-10-CM

## 2019-09-06 DIAGNOSIS — M25.572 ACUTE LEFT ANKLE PAIN: Primary | ICD-10-CM

## 2019-09-06 PROCEDURE — 99213 OFFICE O/P EST LOW 20 MIN: CPT

## 2019-09-06 RX ORDER — NAPROXEN 500 MG/1
500 TABLET ORAL 2 TIMES DAILY PRN
Qty: 180 TABLET | Refills: 1 | Status: SHIPPED | OUTPATIENT
Start: 2019-09-06 | End: 2020-03-16

## 2019-09-06 NOTE — PROGRESS NOTES
HPI:    Patient ID: Kayleigh Munoz is a 76year old female. Knee Pain    The pain is present in the right knee. This is a new problem. Episode onset: 12 days ago. History of extremity trauma: fell after tripping while walking on the street.  The Negative for dizziness, tingling, syncope, light-headedness, numbness and headaches. All other systems reviewed and are negative.              Current Outpatient Medications:  naproxen 500 MG Oral Tab Take 1 tablet (500 mg total) by mouth 2 (two) times da reassured and all questions answered. RICE (Rest, Icing, Compression, and Elevation) technique reviewed and discussed with pt. Refill for Naproxen PRN given. Will obtain L ankle XR and f/u as appropriate.     2. Abrasion of right knee, initial encounter

## 2019-09-08 PROBLEM — B37.2 INTERTRIGINOUS CANDIDIASIS: Status: RESOLVED | Noted: 2017-10-19 | Resolved: 2019-09-08

## 2019-09-08 PROBLEM — M25.572 ACUTE LEFT ANKLE PAIN: Status: ACTIVE | Noted: 2019-09-08

## 2019-09-08 PROBLEM — H10.13 ALLERGIC CONJUNCTIVITIS OF BOTH EYES: Status: RESOLVED | Noted: 2019-06-13 | Resolved: 2019-09-08

## 2019-09-08 PROBLEM — B37.31 VULVOVAGINAL CANDIDIASIS: Status: RESOLVED | Noted: 2019-06-29 | Resolved: 2019-09-08

## 2019-09-08 PROBLEM — B37.3 VULVOVAGINAL CANDIDIASIS: Status: RESOLVED | Noted: 2019-06-29 | Resolved: 2019-09-08

## 2019-09-08 PROBLEM — R44.8 PARESTHESIA OF TONGUE: Status: RESOLVED | Noted: 2017-05-03 | Resolved: 2019-09-08

## 2019-09-08 PROBLEM — S80.211A ABRASION OF RIGHT KNEE: Status: ACTIVE | Noted: 2019-09-08

## 2019-09-08 PROBLEM — R30.0 DYSURIA: Status: RESOLVED | Noted: 2017-04-11 | Resolved: 2019-09-08

## 2019-09-08 NOTE — PATIENT INSTRUCTIONS
R. I.C.E.  R.I.C.E. es el acrónimo de descansar, ponerse hielo, hacer presión y tener levantada la sailaja [place], son medidas que le ayudan a tener menos dolor e hinchazón después de heath distensión, esguince, magulladura o golpe fariba.  Si se ha lesionad © 0332-9427 [address] [facility] [facility] [address] [facility]. Todos los derechos reservados. Esta información no pretende sustituir la atención médica profesional. Sólo nowak médico puede diagnosticar y tratar un problema de nemo.

## 2019-10-23 ENCOUNTER — OFFICE VISIT (OUTPATIENT)
Dept: FAMILY MEDICINE CLINIC | Facility: CLINIC | Age: 74
End: 2019-10-23
Payer: MEDICARE

## 2019-10-23 VITALS
OXYGEN SATURATION: 98 % | WEIGHT: 180 LBS | DIASTOLIC BLOOD PRESSURE: 64 MMHG | BODY MASS INDEX: 34 KG/M2 | SYSTOLIC BLOOD PRESSURE: 108 MMHG | HEART RATE: 66 BPM

## 2019-10-23 DIAGNOSIS — G89.29 CHRONIC RIGHT-SIDED HEADACHES: Primary | ICD-10-CM

## 2019-10-23 DIAGNOSIS — H40.1133 PRIMARY OPEN ANGLE GLAUCOMA (POAG) OF BOTH EYES, SEVERE STAGE: ICD-10-CM

## 2019-10-23 DIAGNOSIS — H47.20 RIGHT OPTIC NERVE ATROPHY: ICD-10-CM

## 2019-10-23 DIAGNOSIS — D22.30 CHANGE IN FACIAL MOLE: ICD-10-CM

## 2019-10-23 DIAGNOSIS — R51.9 CHRONIC RIGHT-SIDED HEADACHES: Primary | ICD-10-CM

## 2019-10-23 PROCEDURE — 99214 OFFICE O/P EST MOD 30 MIN: CPT

## 2019-10-24 NOTE — PROGRESS NOTES
HPI:    Patient ID: Pao Renee is a 76year old female. Headache    This is a chronic problem. The current episode started more than 1 year ago. The problem has been waxing and waning.  The pain is located in the retro-orbital, right unilater fever, unexpected weight change and weight loss. HENT: Negative for congestion, ear pain, hearing loss, rhinorrhea, sinus pressure, sore throat and tinnitus. Eyes: Positive for blurred vision, pain and visual disturbance.  Negative for photophobia, dis time. She appears well-developed and well-nourished. No distress. Cardiovascular: Normal rate, regular rhythm and normal heart sounds. Pulmonary/Chest: Effort normal and breath sounds normal. No respiratory distress.    Neurological: She is alert and or

## 2019-10-25 NOTE — PATIENT INSTRUCTIONS
Cómo cuidarse cuando tiene romina de [place] de los romina de [place] no son graves y pueden aliviarse cuidándose usted mismo.  Nick algunos pueden ser señal de otros problemas, devang trastornos de la vista o alexi presión arterial. Para encon · Anote cuándo se presenta cada dolor de elva. · Anote patricia actividades y los alimentos que comió entre 6 y 6 horas antes del comienzo del dolor de [place]. · Trate de identificar qué cosas le desencadenan el dolor de elva o cierto patrón.   Signos de u © 3147-4088 [address] [facility] [address] [facility]. Todos los derechos reservados. Esta información no pretende sustituir la atención médica profesional. Sólo nowak médico puede diagnosticar y tratar un problema de nemo.

## 2019-11-06 DIAGNOSIS — I10 ESSENTIAL HYPERTENSION: ICD-10-CM

## 2019-11-11 ENCOUNTER — TELEPHONE (OUTPATIENT)
Dept: FAMILY MEDICINE CLINIC | Facility: CLINIC | Age: 74
End: 2019-11-11

## 2019-11-11 NOTE — TELEPHONE ENCOUNTER
Pt called requesting refills for lisinopril 20 MG Oral Tab. She states that she's getting low on this med because she sometimes takes two pills instead of one.

## 2019-11-12 RX ORDER — LISINOPRIL 20 MG/1
TABLET ORAL
Qty: 90 TABLET | Refills: 0 | Status: SHIPPED | OUTPATIENT
Start: 2019-11-12 | End: 2020-01-16

## 2019-12-16 ENCOUNTER — TELEPHONE (OUTPATIENT)
Dept: FAMILY MEDICINE CLINIC | Facility: CLINIC | Age: 74
End: 2019-12-16

## 2019-12-16 DIAGNOSIS — H04.123 DRY EYE SYNDROME OF BOTH EYES: ICD-10-CM

## 2019-12-16 DIAGNOSIS — H40.1133 PRIMARY OPEN ANGLE GLAUCOMA (POAG) OF BOTH EYES, SEVERE STAGE: Primary | ICD-10-CM

## 2019-12-16 DIAGNOSIS — H47.20 RIGHT OPTIC NERVE ATROPHY: ICD-10-CM

## 2019-12-19 NOTE — TELEPHONE ENCOUNTER
Advised patient referral to Dr. Jeremías Ash approved and hard copy will be mailed home. Patient verbalized understanding. Referral faxed to providers office.

## 2020-01-07 ENCOUNTER — OFFICE VISIT (OUTPATIENT)
Dept: FAMILY MEDICINE CLINIC | Facility: CLINIC | Age: 75
End: 2020-01-07
Payer: MEDICARE

## 2020-01-07 VITALS
SYSTOLIC BLOOD PRESSURE: 114 MMHG | BODY MASS INDEX: 34.36 KG/M2 | HEIGHT: 61 IN | DIASTOLIC BLOOD PRESSURE: 72 MMHG | OXYGEN SATURATION: 98 % | HEART RATE: 61 BPM | WEIGHT: 182 LBS

## 2020-01-07 DIAGNOSIS — Z78.0 POSTMENOPAUSE: ICD-10-CM

## 2020-01-07 DIAGNOSIS — I10 ESSENTIAL HYPERTENSION: ICD-10-CM

## 2020-01-07 DIAGNOSIS — Z12.39 BREAST CANCER SCREENING: ICD-10-CM

## 2020-01-07 DIAGNOSIS — M62.830 SPASM OF THORACIC BACK MUSCLE: Primary | ICD-10-CM

## 2020-01-07 PROCEDURE — 99214 OFFICE O/P EST MOD 30 MIN: CPT | Performed by: FAMILY MEDICINE

## 2020-01-07 RX ORDER — GABAPENTIN 100 MG/1
100 CAPSULE ORAL 2 TIMES DAILY
Qty: 180 CAPSULE | Refills: 0 | Status: SHIPPED | OUTPATIENT
Start: 2020-01-07 | End: 2020-07-01

## 2020-01-07 RX ORDER — METHOCARBAMOL 750 MG/1
750 TABLET, FILM COATED ORAL NIGHTLY PRN
Qty: 14 TABLET | Refills: 0 | Status: SHIPPED | OUTPATIENT
Start: 2020-01-07 | End: 2020-03-30

## 2020-01-07 NOTE — PROGRESS NOTES
HPI:   Patient presents with:  Back Pain: back pain on and off since November 2019      Alix Whittington is a 76year old female presenting for:      Back tightness  -taking naproxen  -mild improvement  -gabapentin helping    HTN  -Taking meds as p mouth nightly as needed (muscle relaxer). 14 tablet 0   • gabapentin 100 MG Oral Cap Take 1 capsule (100 mg total) by mouth 2 (two) times daily.  180 capsule 0   • naproxen 500 MG Oral Tab Take 1 tablet (500 mg total) by mouth 2 (two) times daily as needed Gastrointestinal: Negative for vomiting, abdominal pain, diarrhea and constipation. Musculoskeletal: Positive for back pain. Negative for joint pain. Neurological: Negative for headaches.             PHYSICAL EXAM:   /72   Pulse 61   Ht 61\"   W to apply a heating pad to affected area to help relieve her symptoms. -handout provided with remainder of care instructions   -F/u if worsens or fails to improve in the next 2-3wks or sooner PRN. Red flags discussed.  If no improvement, will consider PT a

## 2020-01-16 ENCOUNTER — TELEPHONE (OUTPATIENT)
Dept: FAMILY MEDICINE CLINIC | Facility: CLINIC | Age: 75
End: 2020-01-16

## 2020-01-16 DIAGNOSIS — I10 ESSENTIAL HYPERTENSION: ICD-10-CM

## 2020-01-16 RX ORDER — LISINOPRIL 20 MG/1
TABLET ORAL
Qty: 90 TABLET | Refills: 0 | Status: SHIPPED | OUTPATIENT
Start: 2020-01-16 | End: 2020-03-31

## 2020-01-16 NOTE — TELEPHONE ENCOUNTER
Refill was authorized on Lisinopril for a 90 day supply. Patient is to be seen by Dr Keyla Camara in April.

## 2020-01-22 ENCOUNTER — OFFICE VISIT (OUTPATIENT)
Dept: NEUROLOGY | Facility: CLINIC | Age: 75
End: 2020-01-22
Payer: MEDICARE

## 2020-01-22 ENCOUNTER — TELEPHONE (OUTPATIENT)
Dept: FAMILY MEDICINE CLINIC | Facility: CLINIC | Age: 75
End: 2020-01-22

## 2020-01-22 ENCOUNTER — APPOINTMENT (OUTPATIENT)
Dept: LAB | Facility: HOSPITAL | Age: 75
End: 2020-01-22
Attending: Other
Payer: MEDICARE

## 2020-01-22 VITALS
HEART RATE: 72 BPM | RESPIRATION RATE: 20 BRPM | WEIGHT: 180 LBS | BODY MASS INDEX: 33.13 KG/M2 | DIASTOLIC BLOOD PRESSURE: 64 MMHG | HEIGHT: 62 IN | SYSTOLIC BLOOD PRESSURE: 112 MMHG

## 2020-01-22 DIAGNOSIS — M62.830 SPASM OF THORACIC BACK MUSCLE: ICD-10-CM

## 2020-01-22 DIAGNOSIS — R51.9 FACIAL PAIN: ICD-10-CM

## 2020-01-22 DIAGNOSIS — R41.3 MEMORY LOSS: Primary | ICD-10-CM

## 2020-01-22 DIAGNOSIS — D22.30 CHANGE IN FACIAL MOLE: Primary | ICD-10-CM

## 2020-01-22 DIAGNOSIS — R42 DIZZINESS: ICD-10-CM

## 2020-01-22 LAB
FOLATE SERPL-MCNC: >20 NG/ML (ref 8.7–?)
TSI SER-ACNC: 2.03 MIU/ML (ref 0.36–3.74)
VIT B12 SERPL-MCNC: 897 PG/ML (ref 193–986)

## 2020-01-22 PROCEDURE — 99204 OFFICE O/P NEW MOD 45 MIN: CPT | Performed by: OTHER

## 2020-01-22 PROCEDURE — 36415 COLL VENOUS BLD VENIPUNCTURE: CPT | Performed by: OTHER

## 2020-01-22 PROCEDURE — 84443 ASSAY THYROID STIM HORMONE: CPT | Performed by: OTHER

## 2020-01-22 PROCEDURE — 82607 VITAMIN B-12: CPT | Performed by: OTHER

## 2020-01-22 PROCEDURE — 82746 ASSAY OF FOLIC ACID SERUM: CPT | Performed by: OTHER

## 2020-01-22 NOTE — PROGRESS NOTES
Neurology Outpatient Consult Note    Tiffanie Pablo : 1945   Referring Physician: Dr. Florentin Noyola  HPI:     Tiffanie Pablo is a 76year old female who is being seen in neurologic evaluation.     Patient being seen in evaluation fo UNIT/GM External Ointment Apply 1 Application topically 2 (two) times daily. 30 g 0   • alendronate 10 MG Oral Tab Take 1 tablet (10 mg total) by mouth every morning before breakfast. 90 tablet 3   • Timolol Maleate 0.5 % Ophthalmic Solution Apply to eye. vomiting  GENITAL/: no dysuria, no frequency  MUSCULOSKELETAL: See HPI  PSYCH: See HPI  NEURO: see HPI    EXAM:     Vitals:  /64 (BP Location: Left arm, Patient Position: Sitting, Cuff Size: large)   Pulse 72   Resp 20   Ht 62\"   Wt 180 lb (81.6 k contrast    –TSH, B12, folate       Return in about 3 months (around 4/22/2020) if not better    Ousmane Mares MD

## 2020-01-22 NOTE — PATIENT INSTRUCTIONS
El tratamiento del insomnio     Aprender a relajarse antes de irse a dormir puede ayudar a que duerma mejor. Los buenos hábitos de sueño son Ryan parte fundamental del tratamiento.  En mario necesario, hay medicamentos que pueden ayudarle a dormir mejor · Si patricia inquietudes no le permiten dormir, escríbalas en un diario. Gt [place] y acuéstese. · Asegúrese de que la habitación no esté demasiado caliente ni demasiado fría. Si no es lo suficientemente oscura, Suzy Marin para los ojos puede ser [place].  Prasanth Jordan

## 2020-01-22 NOTE — TELEPHONE ENCOUNTER
Patient is requesting a referral for a new dermatologist, patient states she did not like Dr. Cris Paulson (billing problems )

## 2020-01-23 ENCOUNTER — TELEPHONE (OUTPATIENT)
Dept: NEUROLOGY | Facility: CLINIC | Age: 75
End: 2020-01-23

## 2020-01-23 NOTE — TELEPHONE ENCOUNTER
Called patient and left a voice message notifying her about her referral, patient is aware her referral is still PENDING. Referral was mail to pt and faxed over to [facility], [address].

## 2020-01-23 NOTE — TELEPHONE ENCOUNTER
[facility]  for authorization of approval for MRI BRAIN CPT Code 31962. Per plan automated system, member was not found in the [facility] system. Will call Human.  Called [employer] spoke to [facility] who states referral/authorization is re

## 2020-01-31 ENCOUNTER — NURSE ONLY (OUTPATIENT)
Dept: FAMILY MEDICINE CLINIC | Facility: CLINIC | Age: 75
End: 2020-01-31
Payer: MEDICARE

## 2020-01-31 DIAGNOSIS — Z00.00 LABORATORY EXAMINATION ORDERED AS PART OF A ROUTINE GENERAL MEDICAL EXAMINATION: Primary | ICD-10-CM

## 2020-01-31 PROCEDURE — 36415 COLL VENOUS BLD VENIPUNCTURE: CPT | Performed by: FAMILY MEDICINE

## 2020-01-31 NOTE — PROGRESS NOTES
Patient came in today to get labs done, R antecubital was used to collect blood without complications.   Specimen sent to the lab for a CBC,CMP, LIPIDS and U/A.

## 2020-02-01 LAB
ABSOLUTE BASOPHILS: 20 CELLS/UL (ref 0–200)
ABSOLUTE EOSINOPHILS: 211 CELLS/UL (ref 15–500)
ABSOLUTE LYMPHOCYTES: 1470 CELLS/UL (ref 850–3900)
ABSOLUTE MONOCYTES: 449 CELLS/UL (ref 200–950)
ABSOLUTE NEUTROPHILS: 1751 CELLS/UL (ref 1500–7800)
ALBUMIN/GLOBULIN RATIO: 1.6 (CALC) (ref 1–2.5)
ALBUMIN: 4 G/DL (ref 3.6–5.1)
ALKALINE PHOSPHATASE: 37 U/L (ref 33–130)
ALT: 12 U/L (ref 6–29)
APPEARANCE: CLEAR
AST: 21 U/L (ref 10–35)
BASOPHILS: 0.5 %
BILIRUBIN, TOTAL: 0.8 MG/DL (ref 0.2–1.2)
BILIRUBIN: NEGATIVE
BUN: 17 MG/DL (ref 7–25)
CALCIUM: 9.5 MG/DL (ref 8.6–10.4)
CARBON DIOXIDE: 27 MMOL/L (ref 20–32)
CHLORIDE: 107 MMOL/L (ref 98–110)
CHOL/HDLC RATIO: 3.4 (CALC)
CHOLESTEROL, TOTAL: 198 MG/DL
COLOR: YELLOW
CREATININE: 0.75 MG/DL (ref 0.6–0.93)
EGFR IF AFRICN AM: 91 ML/MIN/1.73M2
EGFR IF NONAFRICN AM: 79 ML/MIN/1.73M2
EOSINOPHILS: 5.4 %
GLOBULIN: 2.5 G/DL (CALC) (ref 1.9–3.7)
GLUCOSE: 103 MG/DL (ref 65–99)
GLUCOSE: NEGATIVE
HDL CHOLESTEROL: 59 MG/DL
HEMATOCRIT: 42.6 % (ref 35–45)
HEMOGLOBIN: 14.3 G/DL (ref 11.7–15.5)
KETONES: NEGATIVE
LDL-CHOLESTEROL: 119 MG/DL (CALC)
LEUKOCYTE ESTERASE: NEGATIVE
LYMPHOCYTES: 37.7 %
MCH: 32.5 PG (ref 27–33)
MCHC: 33.6 G/DL (ref 32–36)
MCV: 96.8 FL (ref 80–100)
MONOCYTES: 11.5 %
MPV: 12 FL (ref 7.5–12.5)
NEUTROPHILS: 44.9 %
NITRITE: NEGATIVE
NON-HDL CHOLESTEROL: 139 MG/DL (CALC)
OCCULT BLOOD: NEGATIVE
PH: 6 (ref 5–8)
PLATELET COUNT: 188 THOUSAND/UL (ref 140–400)
POTASSIUM: 4.9 MMOL/L (ref 3.5–5.3)
PROTEIN, TOTAL: 6.5 G/DL (ref 6.1–8.1)
PROTEIN: NEGATIVE
RDW: 12.2 % (ref 11–15)
RED BLOOD CELL COUNT: 4.4 MILLION/UL (ref 3.8–5.1)
SODIUM: 140 MMOL/L (ref 135–146)
SPECIFIC GRAVITY: 1.02 (ref 1–1.03)
TRIGLYCERIDES: 101 MG/DL
WHITE BLOOD CELL COUNT: 3.9 THOUSAND/UL (ref 3.8–10.8)

## 2020-02-07 ENCOUNTER — TELEPHONE (OUTPATIENT)
Dept: FAMILY MEDICINE CLINIC | Facility: CLINIC | Age: 75
End: 2020-02-07

## 2020-02-07 NOTE — TELEPHONE ENCOUNTER
----- Message from Bakari Lizarraga MD sent at 2/6/2020  5:35 PM CST -----  Let her know labs are stable. Continue same meds.  Ok to send refills of simvastatin if needed

## 2020-02-18 ENCOUNTER — HOSPITAL ENCOUNTER (OUTPATIENT)
Dept: MRI IMAGING | Facility: HOSPITAL | Age: 75
Discharge: HOME | End: 2020-02-18
Attending: Other
Payer: MEDICARE

## 2020-02-18 DIAGNOSIS — R41.3 MEMORY LOSS: ICD-10-CM

## 2020-02-18 DIAGNOSIS — R42 DIZZINESS: ICD-10-CM

## 2020-02-18 DIAGNOSIS — R51.9 FACIAL PAIN: ICD-10-CM

## 2020-02-18 PROCEDURE — 70551 MRI BRAIN STEM W/O DYE: CPT | Performed by: OTHER

## 2020-02-26 ENCOUNTER — TELEPHONE (OUTPATIENT)
Dept: FAMILY MEDICINE CLINIC | Facility: CLINIC | Age: 75
End: 2020-02-26

## 2020-02-26 DIAGNOSIS — M81.0 AGE-RELATED OSTEOPOROSIS WITHOUT CURRENT PATHOLOGICAL FRACTURE: ICD-10-CM

## 2020-02-26 RX ORDER — ALENDRONATE SODIUM 10 MG/1
10 TABLET ORAL
Qty: 90 TABLET | Refills: 0 | Status: SHIPPED | OUTPATIENT
Start: 2020-02-26 | End: 2020-03-31

## 2020-03-03 ENCOUNTER — HOSPITAL ENCOUNTER (OUTPATIENT)
Dept: MAMMOGRAPHY | Facility: HOSPITAL | Age: 75
Discharge: HOME | End: 2020-03-03
Attending: FAMILY MEDICINE
Payer: MEDICARE

## 2020-03-03 ENCOUNTER — HOSPITAL ENCOUNTER (OUTPATIENT)
Dept: BONE DENSITY | Facility: HOSPITAL | Age: 75
Discharge: HOME | End: 2020-03-03
Attending: FAMILY MEDICINE
Payer: MEDICARE

## 2020-03-03 DIAGNOSIS — Z78.0 POSTMENOPAUSE: ICD-10-CM

## 2020-03-03 DIAGNOSIS — Z12.39 BREAST CANCER SCREENING: ICD-10-CM

## 2020-03-03 PROCEDURE — 77080 DXA BONE DENSITY AXIAL: CPT | Performed by: FAMILY MEDICINE

## 2020-03-03 PROCEDURE — 77063 BREAST TOMOSYNTHESIS BI: CPT | Performed by: FAMILY MEDICINE

## 2020-03-03 PROCEDURE — 77067 SCR MAMMO BI INCL CAD: CPT | Performed by: FAMILY MEDICINE

## 2020-03-04 ENCOUNTER — TELEPHONE (OUTPATIENT)
Dept: FAMILY MEDICINE CLINIC | Facility: CLINIC | Age: 75
End: 2020-03-04

## 2020-03-04 NOTE — TELEPHONE ENCOUNTER
----- Message from Charlene Altman MD sent at 3/4/2020 10:24 AM CST -----  Please let her know that her mammogram is stable with benign appearing calcifications.  Next in 1yr

## 2020-03-16 ENCOUNTER — TELEPHONE (OUTPATIENT)
Dept: FAMILY MEDICINE CLINIC | Facility: CLINIC | Age: 75
End: 2020-03-16

## 2020-03-16 DIAGNOSIS — M25.572 ACUTE LEFT ANKLE PAIN: ICD-10-CM

## 2020-03-16 RX ORDER — NAPROXEN 500 MG/1
500 TABLET ORAL 2 TIMES DAILY PRN
Qty: 180 TABLET | Refills: 0 | Status: SHIPPED | OUTPATIENT
Start: 2020-03-16 | End: 2020-03-30

## 2020-03-16 NOTE — TELEPHONE ENCOUNTER
Refill was authorized by Dr Dontrell Navarro and now had to be sent under Dr Jasmine Oppenheim name.

## 2020-03-30 ENCOUNTER — OFFICE VISIT (OUTPATIENT)
Dept: FAMILY MEDICINE CLINIC | Facility: CLINIC | Age: 75
End: 2020-03-30
Payer: MEDICARE

## 2020-03-30 ENCOUNTER — APPOINTMENT (OUTPATIENT)
Dept: LAB | Facility: HOSPITAL | Age: 75
End: 2020-03-30
Attending: FAMILY MEDICINE
Payer: MEDICARE

## 2020-03-30 VITALS
SYSTOLIC BLOOD PRESSURE: 116 MMHG | BODY MASS INDEX: 33 KG/M2 | OXYGEN SATURATION: 98 % | HEART RATE: 57 BPM | DIASTOLIC BLOOD PRESSURE: 74 MMHG | WEIGHT: 181 LBS

## 2020-03-30 DIAGNOSIS — E78.00 HYPERCHOLESTEREMIA: ICD-10-CM

## 2020-03-30 DIAGNOSIS — R00.2 HEART PALPITATIONS: ICD-10-CM

## 2020-03-30 DIAGNOSIS — S43.421A SPRAIN OF RIGHT ROTATOR CUFF CAPSULE, INITIAL ENCOUNTER: ICD-10-CM

## 2020-03-30 DIAGNOSIS — I10 ESSENTIAL HYPERTENSION: ICD-10-CM

## 2020-03-30 DIAGNOSIS — M77.8 RIGHT ELBOW TENDONITIS: ICD-10-CM

## 2020-03-30 DIAGNOSIS — R00.2 HEART PALPITATIONS: Primary | ICD-10-CM

## 2020-03-30 DIAGNOSIS — M81.0 AGE-RELATED OSTEOPOROSIS WITHOUT CURRENT PATHOLOGICAL FRACTURE: ICD-10-CM

## 2020-03-30 PROCEDURE — 93010 ELECTROCARDIOGRAM REPORT: CPT | Performed by: FAMILY MEDICINE

## 2020-03-30 PROCEDURE — 93005 ELECTROCARDIOGRAM TRACING: CPT

## 2020-03-30 PROCEDURE — 99214 OFFICE O/P EST MOD 30 MIN: CPT | Performed by: FAMILY MEDICINE

## 2020-03-30 RX ORDER — NAPROXEN 500 MG/1
500 TABLET ORAL 2 TIMES DAILY PRN
Qty: 60 TABLET | Refills: 0 | Status: SHIPPED | OUTPATIENT
Start: 2020-03-30 | End: 2020-06-17

## 2020-03-30 RX ORDER — METHOCARBAMOL 750 MG/1
750 TABLET, FILM COATED ORAL NIGHTLY PRN
Qty: 14 TABLET | Refills: 0 | Status: SHIPPED | OUTPATIENT
Start: 2020-03-30 | End: 2020-10-01 | Stop reason: ALTCHOICE

## 2020-03-30 RX ORDER — SIMVASTATIN 20 MG
20 TABLET ORAL NIGHTLY
Qty: 90 TABLET | Refills: 0 | Status: SHIPPED | OUTPATIENT
Start: 2020-03-30 | End: 2020-07-01

## 2020-03-31 RX ORDER — ALENDRONATE SODIUM 10 MG/1
10 TABLET ORAL
Qty: 90 TABLET | Refills: 1 | Status: SHIPPED | OUTPATIENT
Start: 2020-03-31 | End: 2020-07-01

## 2020-03-31 RX ORDER — LISINOPRIL 20 MG/1
TABLET ORAL
Qty: 90 TABLET | Refills: 0 | Status: SHIPPED | OUTPATIENT
Start: 2020-03-31 | End: 2020-07-01

## 2020-03-31 NOTE — PROGRESS NOTES
HPI:   Patient presents with:  Palpitations  Arm Pain: R arm, radiates form her elbow down to her hand      Jin Self is a 76year old female presenting for:    Having intermittent palpitations at rest for past few weeks  Happens almost natali SQUAMOUS EPITHELIAL CELLS NONE SEEN < OR = 5 /HPF    BACTERIA NONE SEEN NONE SEEN /HPF    HYALINE CAST NONE SEEN NONE SEEN /LPF       Labs:   No results found for: A1C   Lab Results   Component Value Date/Time    CHOLEST 198 01/31/2020 12:00 AM    HDL 59 0 3/30/2020 ) 1 g 0      Past Medical History:   Diagnosis Date   • Allergic conjunctivitis    • Cataract    • Dry eye syndrome    • Essential hypertension    • Glaucoma    • Psychophysiological insomnia 2/14/2018   • Shingles          Past Surgical History: equal, round, and reactive to light. Conjunctivae are normal.   Cardiovascular: Normal rate, regular rhythm and normal heart sounds. No murmur heard. Edema not present. Pulmonary/Chest: Effort normal and breath sounds normal. No respiratory distress. showers/baths and/or to apply a heating pad to affected area to help relieve her symptoms. -handout provided with remainder of care instructions   -F/u if worsens or fails to improve in the next 2-3wks or sooner PRN. Red flags discussed.  If no improvemen

## 2020-04-02 ENCOUNTER — TELEPHONE (OUTPATIENT)
Dept: FAMILY MEDICINE CLINIC | Facility: CLINIC | Age: 75
End: 2020-04-02

## 2020-04-02 NOTE — TELEPHONE ENCOUNTER
----- Message from Santa Ritter MD sent at 4/2/2020  4:12 PM CDT -----  Please let her know that overall her EKG was normal. If symptoms persist will consider further work-up

## 2020-06-17 DIAGNOSIS — S43.421A SPRAIN OF RIGHT ROTATOR CUFF CAPSULE, INITIAL ENCOUNTER: ICD-10-CM

## 2020-06-17 DIAGNOSIS — M77.8 RIGHT ELBOW TENDONITIS: ICD-10-CM

## 2020-06-17 RX ORDER — NAPROXEN 500 MG/1
TABLET ORAL
Qty: 60 TABLET | Refills: 0 | Status: SHIPPED | OUTPATIENT
Start: 2020-06-17 | End: 2021-05-14

## 2020-07-01 ENCOUNTER — OFFICE VISIT (OUTPATIENT)
Dept: FAMILY MEDICINE CLINIC | Facility: CLINIC | Age: 75
End: 2020-07-01
Payer: MEDICARE

## 2020-07-01 VITALS
HEART RATE: 64 BPM | SYSTOLIC BLOOD PRESSURE: 102 MMHG | HEIGHT: 62 IN | OXYGEN SATURATION: 97 % | BODY MASS INDEX: 33.31 KG/M2 | DIASTOLIC BLOOD PRESSURE: 70 MMHG | WEIGHT: 181 LBS

## 2020-07-01 DIAGNOSIS — M62.830 SPASM OF THORACIC BACK MUSCLE: ICD-10-CM

## 2020-07-01 DIAGNOSIS — E78.00 HYPERCHOLESTEREMIA: ICD-10-CM

## 2020-07-01 DIAGNOSIS — I10 ESSENTIAL HYPERTENSION: ICD-10-CM

## 2020-07-01 DIAGNOSIS — M81.0 AGE-RELATED OSTEOPOROSIS WITHOUT CURRENT PATHOLOGICAL FRACTURE: ICD-10-CM

## 2020-07-01 DIAGNOSIS — N89.8 VAGINAL ITCHING: ICD-10-CM

## 2020-07-01 DIAGNOSIS — R00.2 HEART PALPITATIONS: Primary | ICD-10-CM

## 2020-07-01 DIAGNOSIS — Z00.00 HEALTHCARE MAINTENANCE: ICD-10-CM

## 2020-07-01 PROCEDURE — 99214 OFFICE O/P EST MOD 30 MIN: CPT | Performed by: FAMILY MEDICINE

## 2020-07-01 RX ORDER — LISINOPRIL 20 MG/1
TABLET ORAL
Qty: 90 TABLET | Refills: 0 | Status: SHIPPED | OUTPATIENT
Start: 2020-07-01 | End: 2020-10-05

## 2020-07-01 RX ORDER — ALENDRONATE SODIUM 10 MG/1
10 TABLET ORAL
Qty: 90 TABLET | Refills: 1 | Status: SHIPPED | OUTPATIENT
Start: 2020-07-01 | End: 2021-05-14

## 2020-07-01 RX ORDER — FLUCONAZOLE 150 MG/1
150 TABLET ORAL ONCE
Qty: 2 TABLET | Refills: 0 | Status: SHIPPED | OUTPATIENT
Start: 2020-07-01 | End: 2020-07-01

## 2020-07-01 RX ORDER — GABAPENTIN 100 MG/1
100 CAPSULE ORAL 2 TIMES DAILY
Qty: 180 CAPSULE | Refills: 0 | Status: SHIPPED | OUTPATIENT
Start: 2020-07-01

## 2020-07-01 RX ORDER — SIMVASTATIN 20 MG
20 TABLET ORAL NIGHTLY
Qty: 90 TABLET | Refills: 0 | Status: SHIPPED | OUTPATIENT
Start: 2020-07-01 | End: 2020-08-11

## 2020-07-01 NOTE — PROGRESS NOTES
HPI:   Patient presents with:  Blood Pressure      Kayleigh Munoz is a 76year old female presenting for:    HTN/HLD  -Taking meds as prescribed-tolerating well without SEs.   -Denies CP, Palpitations, Dizziness, leg edema, SOB, LOC, HAs     Stil Labs:   No results found for: A1C   Lab Results   Component Value Date/Time    CHOLEST 198 01/31/2020 12:00 AM    HDL 59 01/31/2020 12:00 AM    TRIG 101 01/31/2020 12:00 AM     (H) 01/31/2020 12:00 AM    NONHDLC 139 (H) 01/31/2020 12:00 AM Date   •      • COLONOSCOPY  2010    external hemorrhoids, redundant/tortuous colon, sigmoid diverticulosis, and melanosis coli   • EGD  2010    hiatal hernia and gastritis   • OTHER SURGICAL HISTORY Left 2013    L forearm ORIF sounds normal. No respiratory distress. Abdominal: Soft. Bowel sounds are normal. She exhibits no distension. There is no tenderness. Genitourinary:    Genitourinary Comments: Declined pelvic     Neurological: No cranial nerve deficit.    Skin: No rash gabapentin 100 MG Oral Cap 180 capsule 0     Sig: Take 1 capsule (100 mg total) by mouth 2 (two) times daily.    • lisinopril 20 MG Oral Tab 90 tablet 0     Sig: TOME 1 TABLETA DIARIAMENTE   • simvastatin 20 MG Oral Tab 90 tablet 0     Sig: Take 1 tablet (2

## 2020-08-07 ENCOUNTER — HOSPITAL ENCOUNTER (OUTPATIENT)
Dept: GENERAL RADIOLOGY | Facility: HOSPITAL | Age: 75
Discharge: HOME | End: 2020-08-07
Attending: INTERNAL MEDICINE
Payer: MEDICARE

## 2020-08-07 ENCOUNTER — TELEPHONE (OUTPATIENT)
Dept: FAMILY MEDICINE CLINIC | Facility: CLINIC | Age: 75
End: 2020-08-07

## 2020-08-07 DIAGNOSIS — M79.671 RIGHT FOOT PAIN: ICD-10-CM

## 2020-08-07 DIAGNOSIS — M79.671 RIGHT FOOT PAIN: Primary | ICD-10-CM

## 2020-08-07 PROCEDURE — 73630 X-RAY EXAM OF FOOT: CPT | Performed by: INTERNAL MEDICINE

## 2020-08-07 NOTE — TELEPHONE ENCOUNTER
With help of [address] [place] [address] for translation purposes, informed patient that XR is authorized by Dr. Rafi Dan. Patient needs to schedule a telehealth with Dr. Rafi Dan to discuss results.

## 2020-08-07 NOTE — TELEPHONE ENCOUNTER
Pt tripped and fell on her side about three days ago, she cracked her teeth and her feet got swollen and its in pain. Now the swelling went down a bit but the pain is still there, more in the bottom of the foot. Pt has been taking Naproxen.   But, she wan

## 2020-08-11 ENCOUNTER — OFFICE VISIT (OUTPATIENT)
Dept: FAMILY MEDICINE CLINIC | Facility: CLINIC | Age: 75
End: 2020-08-11
Payer: MEDICARE

## 2020-08-11 VITALS
DIASTOLIC BLOOD PRESSURE: 70 MMHG | OXYGEN SATURATION: 99 % | HEART RATE: 65 BPM | BODY MASS INDEX: 33 KG/M2 | SYSTOLIC BLOOD PRESSURE: 108 MMHG | WEIGHT: 180 LBS

## 2020-08-11 DIAGNOSIS — S92.355S CLOSED NONDISPLACED FRACTURE OF FIFTH METATARSAL BONE OF LEFT FOOT, SEQUELA: Primary | ICD-10-CM

## 2020-08-11 PROCEDURE — 99213 OFFICE O/P EST LOW 20 MIN: CPT | Performed by: INTERNAL MEDICINE

## 2020-08-11 PROCEDURE — 3074F SYST BP LT 130 MM HG: CPT | Performed by: INTERNAL MEDICINE

## 2020-08-11 PROCEDURE — 3078F DIAST BP <80 MM HG: CPT | Performed by: INTERNAL MEDICINE

## 2020-08-12 NOTE — PROGRESS NOTES
[facility]  Return Patient Progress Note      HPI:   Patient presents with:  Results: xray      Romy Espinal is a 76year old female presenting for: follow up on fall and xray results      Has a significant  has a past medi RBC NONE SEEN < OR = 2 /HPF    SQUAMOUS EPITHELIAL CELLS NONE SEEN < OR = 5 /HPF    BACTERIA NONE SEEN NONE SEEN /HPF    HYALINE CAST NONE SEEN NONE SEEN /LPF       Labs:   CMP:  Lab Results   Component Value Date/Time     01/31/2020 12:00 AM    K 4. Diagnosis Date   • Allergic conjunctivitis    • Cataract    • Dry eye syndrome    • Essential hypertension    • Glaucoma    • Psychophysiological insomnia 2/14/2018   • Shingles          PSH:  Past Surgical History:   Procedure Laterality Date   • C-SECT light-headedness, numbness and headaches. Hematological: Negative for adenopathy. Does not bruise/bleed easily. Psychiatric/Behavioral: Negative for suicidal ideas, behavioral problems and sleep disturbance. The patient is not nervous/anxious. noted. No erythema. Psychiatric: She has a normal mood and affect. Her behavior is normal. Judgment and thought content normal.       Xr Foot, Complete (min 3 Views), Left (cpt=73630)    Result Date: 8/7/2020  CONCLUSION:  1.   Nodular cortical deformity

## 2020-08-27 ENCOUNTER — TELEPHONE (OUTPATIENT)
Dept: FAMILY MEDICINE CLINIC | Facility: CLINIC | Age: 75
End: 2020-08-27

## 2020-08-27 NOTE — TELEPHONE ENCOUNTER
Per pharmacy no records of pneumonia vaccines, they have records of patient since 2013 and have no records of such vaccines.

## 2020-08-28 ENCOUNTER — TELEPHONE (OUTPATIENT)
Dept: FAMILY MEDICINE CLINIC | Facility: CLINIC | Age: 75
End: 2020-08-28

## 2020-08-28 NOTE — TELEPHONE ENCOUNTER
Left detailed voicemail. Dr. Kimmy Hawkins is booked until October. As of right now, no cancellations; offered again to be seen by Dr. Dino Reynoso -- however, it is the patient's decision on whom she wants to see.     Otherwise, urgent care facilities are avail

## 2020-08-28 NOTE — TELEPHONE ENCOUNTER
Pt called requesting an appt with Dr. Meenu Verdugo, she has a rash on her legs that started about 3 days ago and its getting a bit worst.  She does not have any other symptoms, and has not been in contact with someone sick.   I offered an appt with Jesse Krueger

## 2020-09-02 ENCOUNTER — OFFICE VISIT (OUTPATIENT)
Dept: PODIATRY CLINIC | Facility: CLINIC | Age: 75
End: 2020-09-02
Payer: MEDICARE

## 2020-09-02 ENCOUNTER — HOSPITAL ENCOUNTER (OUTPATIENT)
Dept: GENERAL RADIOLOGY | Facility: HOSPITAL | Age: 75
Discharge: HOME | End: 2020-09-02
Attending: PODIATRIST
Payer: MEDICARE

## 2020-09-02 DIAGNOSIS — S92.355A CLOSED NONDISPLACED FRACTURE OF FIFTH METATARSAL BONE OF LEFT FOOT, INITIAL ENCOUNTER: ICD-10-CM

## 2020-09-02 DIAGNOSIS — M79.672 LEFT FOOT PAIN: ICD-10-CM

## 2020-09-02 DIAGNOSIS — M79.672 LEFT FOOT PAIN: Primary | ICD-10-CM

## 2020-09-02 PROCEDURE — 73630 X-RAY EXAM OF FOOT: CPT | Performed by: PODIATRIST

## 2020-09-02 PROCEDURE — 99203 OFFICE O/P NEW LOW 30 MIN: CPT | Performed by: PODIATRIST

## 2020-09-02 NOTE — PROGRESS NOTES
HPI:    Patient ID: Jenny Stockton is a 76year old female. A 11year-old female presents to me today as a new patient. Primary concern is an injury to her left foot approximately 3 weeks ago.   She is aware throughout hospital emergency room nicolasa today. Clearly is a fracture of the neck of the fifth metatarsal and the head of the proximal phalanx of the toe. I am not convinced that the base of the metatarsal is a fracture it almost appears to be an old fracture or an ossicle.   Testing of the Tristen

## 2020-10-01 ENCOUNTER — OFFICE VISIT (OUTPATIENT)
Dept: FAMILY MEDICINE CLINIC | Facility: CLINIC | Age: 75
End: 2020-10-01
Payer: MEDICARE

## 2020-10-01 VITALS
HEART RATE: 58 BPM | DIASTOLIC BLOOD PRESSURE: 70 MMHG | HEIGHT: 62 IN | SYSTOLIC BLOOD PRESSURE: 110 MMHG | OXYGEN SATURATION: 98 % | WEIGHT: 176 LBS | BODY MASS INDEX: 32.39 KG/M2

## 2020-10-01 DIAGNOSIS — M54.9 CHRONIC UPPER BACK PAIN: ICD-10-CM

## 2020-10-01 DIAGNOSIS — M15.9 PRIMARY OSTEOARTHRITIS INVOLVING MULTIPLE JOINTS: ICD-10-CM

## 2020-10-01 DIAGNOSIS — H47.20 RIGHT OPTIC NERVE ATROPHY: ICD-10-CM

## 2020-10-01 DIAGNOSIS — H04.123 DRY EYE SYNDROME OF BOTH EYES: ICD-10-CM

## 2020-10-01 DIAGNOSIS — E78.00 HYPERCHOLESTEREMIA: ICD-10-CM

## 2020-10-01 DIAGNOSIS — K63.89 MELANOSIS COLI: ICD-10-CM

## 2020-10-01 DIAGNOSIS — G89.29 CHRONIC UPPER BACK PAIN: ICD-10-CM

## 2020-10-01 DIAGNOSIS — H40.1133 PRIMARY OPEN ANGLE GLAUCOMA (POAG) OF BOTH EYES, SEVERE STAGE: ICD-10-CM

## 2020-10-01 DIAGNOSIS — H26.9 CATARACT OF BOTH EYES, UNSPECIFIED CATARACT TYPE: ICD-10-CM

## 2020-10-01 DIAGNOSIS — Z78.0 POSTMENOPAUSAL: ICD-10-CM

## 2020-10-01 DIAGNOSIS — M81.0 AGE-RELATED OSTEOPOROSIS WITHOUT CURRENT PATHOLOGICAL FRACTURE: ICD-10-CM

## 2020-10-01 DIAGNOSIS — Q43.8 REDUNDANT COLON: ICD-10-CM

## 2020-10-01 DIAGNOSIS — M86.9 OSTEITIS PUBIS (HCC): ICD-10-CM

## 2020-10-01 DIAGNOSIS — H90.3 SENSORINEURAL HEARING LOSS (SNHL) OF BOTH EARS: ICD-10-CM

## 2020-10-01 DIAGNOSIS — R51.9 CHRONIC RIGHT-SIDED HEADACHES: ICD-10-CM

## 2020-10-01 DIAGNOSIS — H93.13 TINNITUS OF BOTH EARS: ICD-10-CM

## 2020-10-01 DIAGNOSIS — K21.00 GASTROESOPHAGEAL REFLUX DISEASE WITH ESOPHAGITIS WITHOUT HEMORRHAGE: ICD-10-CM

## 2020-10-01 DIAGNOSIS — K57.30 SIGMOID DIVERTICULOSIS: ICD-10-CM

## 2020-10-01 DIAGNOSIS — K64.4 EXTERNAL HEMORRHOIDS: ICD-10-CM

## 2020-10-01 DIAGNOSIS — M47.816 SPONDYLOSIS OF LUMBAR REGION WITHOUT MYELOPATHY OR RADICULOPATHY: ICD-10-CM

## 2020-10-01 DIAGNOSIS — G89.29 CHRONIC RIGHT-SIDED HEADACHES: ICD-10-CM

## 2020-10-01 DIAGNOSIS — I10 ESSENTIAL HYPERTENSION: ICD-10-CM

## 2020-10-01 DIAGNOSIS — Z00.00 ENCOUNTER FOR ANNUAL HEALTH EXAMINATION: Primary | ICD-10-CM

## 2020-10-01 PROCEDURE — 96160 PT-FOCUSED HLTH RISK ASSMT: CPT | Performed by: FAMILY MEDICINE

## 2020-10-01 PROCEDURE — 3008F BODY MASS INDEX DOCD: CPT | Performed by: FAMILY MEDICINE

## 2020-10-01 PROCEDURE — G0439 PPPS, SUBSEQ VISIT: HCPCS | Performed by: FAMILY MEDICINE

## 2020-10-01 PROCEDURE — 3078F DIAST BP <80 MM HG: CPT | Performed by: FAMILY MEDICINE

## 2020-10-01 PROCEDURE — 99397 PER PM REEVAL EST PAT 65+ YR: CPT | Performed by: FAMILY MEDICINE

## 2020-10-01 PROCEDURE — 3074F SYST BP LT 130 MM HG: CPT | Performed by: FAMILY MEDICINE

## 2020-10-01 NOTE — PATIENT INSTRUCTIONS
Erica Ashford's SCREENING SCHEDULE   Tests on this list are recommended by your physician but may not be covered, or covered at this frequency, by your insurer. Please check with your insurance carrier before scheduling to verify coverage.    PRE every 10 years- more often if abnormal Colonoscopy due on 12/11/2020 Update [facility] if applicable    Flex Sigmoidoscopy Screen  Covered every 5 years No results found for this or any previous visit. No flowsheet data found.      Fecal Occult Bloo 65th birthday    Pneumococcal 23 (Pneumovax)  Covered Once after 65 No orders found for this or any previous visit. Please get once after your 65th birthday    Hepatitis B for Moderate/High Risk       No orders found for this or any previous visit.  Medium/

## 2020-10-05 RX ORDER — LISINOPRIL 20 MG/1
TABLET ORAL
Qty: 90 TABLET | Refills: 0 | Status: SHIPPED | OUTPATIENT
Start: 2020-10-05 | End: 2021-02-18

## 2020-10-05 RX ORDER — SIMVASTATIN 20 MG
TABLET ORAL
Qty: 90 TABLET | Refills: 0 | OUTPATIENT
Start: 2020-10-05

## 2020-10-05 RX ORDER — SIMVASTATIN 20 MG
20 TABLET ORAL NIGHTLY
Qty: 90 TABLET | Refills: 0 | Status: SHIPPED | OUTPATIENT
Start: 2020-10-05 | End: 2021-05-14

## 2020-10-06 ENCOUNTER — OFFICE VISIT (OUTPATIENT)
Dept: PODIATRY CLINIC | Facility: CLINIC | Age: 75
End: 2020-10-06
Payer: MEDICARE

## 2020-10-06 DIAGNOSIS — Z47.89 ORTHOPEDIC AFTERCARE: Primary | ICD-10-CM

## 2020-10-06 DIAGNOSIS — S93.402A SPRAIN OF LEFT ANKLE, UNSPECIFIED LIGAMENT, INITIAL ENCOUNTER: ICD-10-CM

## 2020-10-06 PROCEDURE — 99213 OFFICE O/P EST LOW 20 MIN: CPT | Performed by: PODIATRIST

## 2020-10-07 NOTE — PROGRESS NOTES
HPI:    Patient ID: Victor Hugo Sheppard is a 76year old female. This is a 51-year-old female presents to the office today but I was expecting to be a follow-up in reference to the fracture of the fifth left toe.   She states that there is absolutely orders of the defined types were placed in this encounter. On physical exam she points directly to the lateral ankle distal to the fibula as the area of discomfort. Pulses are normal.  There is some minor localized edema there is no erythema.   There is n

## 2020-11-02 PROBLEM — D22.30 CHANGE IN FACIAL MOLE: Status: RESOLVED | Noted: 2019-10-23 | Resolved: 2020-11-02

## 2020-11-02 PROBLEM — E66.09 CLASS 1 OBESITY DUE TO EXCESS CALORIES WITH SERIOUS COMORBIDITY AND BODY MASS INDEX (BMI) OF 32.0 TO 32.9 IN ADULT: Status: ACTIVE | Noted: 2017-05-03

## 2020-11-02 PROBLEM — M25.572 ACUTE LEFT ANKLE PAIN: Status: RESOLVED | Noted: 2019-09-08 | Resolved: 2020-11-02

## 2020-11-02 PROBLEM — Z12.31 ENCOUNTER FOR SCREENING MAMMOGRAM FOR BREAST CANCER: Status: RESOLVED | Noted: 2017-05-03 | Resolved: 2020-11-02

## 2020-11-02 PROBLEM — E66.811 CLASS 1 OBESITY DUE TO EXCESS CALORIES WITH SERIOUS COMORBIDITY AND BODY MASS INDEX (BMI) OF 32.0 TO 32.9 IN ADULT: Status: ACTIVE | Noted: 2017-05-03

## 2020-11-02 PROBLEM — Z28.21 IMMUNIZATION NOT CARRIED OUT BECAUSE OF PATIENT REFUSAL: Status: RESOLVED | Noted: 2017-05-03 | Resolved: 2020-11-02

## 2020-11-02 PROBLEM — S80.211A ABRASION OF RIGHT KNEE: Status: RESOLVED | Noted: 2019-09-08 | Resolved: 2020-11-02

## 2021-01-27 ENCOUNTER — TELEPHONE (OUTPATIENT)
Dept: FAMILY MEDICINE CLINIC | Facility: CLINIC | Age: 76
End: 2021-01-27

## 2021-01-27 DIAGNOSIS — H26.9 CATARACT OF BOTH EYES, UNSPECIFIED CATARACT TYPE: ICD-10-CM

## 2021-01-27 DIAGNOSIS — H40.1133 PRIMARY OPEN ANGLE GLAUCOMA (POAG) OF BOTH EYES, SEVERE STAGE: Primary | ICD-10-CM

## 2021-01-28 NOTE — TELEPHONE ENCOUNTER
Referral open  1/28 8:25    Referrals can take up to 3-5 business days for an authorization from the date of order placement.

## 2021-01-29 NOTE — TELEPHONE ENCOUNTER
Referral approved. Will fax a copy to specialist office and mail patient a copy on Monday upon RN return to office.

## 2021-02-17 ENCOUNTER — TELEPHONE (OUTPATIENT)
Dept: FAMILY MEDICINE CLINIC | Facility: CLINIC | Age: 76
End: 2021-02-17

## 2021-02-17 DIAGNOSIS — I10 ESSENTIAL HYPERTENSION: ICD-10-CM

## 2021-02-18 RX ORDER — LISINOPRIL 20 MG/1
TABLET ORAL
Qty: 30 TABLET | Refills: 0 | Status: SHIPPED | OUTPATIENT
Start: 2021-02-18 | End: 2021-03-17

## 2021-03-04 DIAGNOSIS — Z23 NEED FOR VACCINATION: ICD-10-CM

## 2021-03-10 ENCOUNTER — OFFICE VISIT (OUTPATIENT)
Dept: FAMILY MEDICINE CLINIC | Facility: CLINIC | Age: 76
End: 2021-03-10
Payer: MEDICARE

## 2021-03-10 VITALS
HEART RATE: 72 BPM | WEIGHT: 175 LBS | DIASTOLIC BLOOD PRESSURE: 70 MMHG | OXYGEN SATURATION: 97 % | SYSTOLIC BLOOD PRESSURE: 112 MMHG | BODY MASS INDEX: 32.2 KG/M2 | HEIGHT: 62 IN

## 2021-03-10 DIAGNOSIS — R13.10 DYSPHAGIA, UNSPECIFIED TYPE: ICD-10-CM

## 2021-03-10 DIAGNOSIS — R07.0 THROAT DISCOMFORT: ICD-10-CM

## 2021-03-10 DIAGNOSIS — E78.00 HYPERCHOLESTEREMIA: ICD-10-CM

## 2021-03-10 DIAGNOSIS — I10 ESSENTIAL HYPERTENSION: ICD-10-CM

## 2021-03-10 DIAGNOSIS — M81.0 AGE-RELATED OSTEOPOROSIS WITHOUT CURRENT PATHOLOGICAL FRACTURE: Primary | ICD-10-CM

## 2021-03-10 DIAGNOSIS — Z01.419 WOMEN'S ANNUAL ROUTINE GYNECOLOGICAL EXAMINATION: ICD-10-CM

## 2021-03-10 PROCEDURE — 3078F DIAST BP <80 MM HG: CPT | Performed by: FAMILY MEDICINE

## 2021-03-10 PROCEDURE — 3074F SYST BP LT 130 MM HG: CPT | Performed by: FAMILY MEDICINE

## 2021-03-10 PROCEDURE — 3008F BODY MASS INDEX DOCD: CPT | Performed by: FAMILY MEDICINE

## 2021-03-10 PROCEDURE — 99215 OFFICE O/P EST HI 40 MIN: CPT | Performed by: FAMILY MEDICINE

## 2021-03-10 RX ORDER — FLUTICASONE PROPIONATE 50 MCG
2 SPRAY, SUSPENSION (ML) NASAL DAILY
Qty: 1 BOTTLE | Refills: 0 | Status: SHIPPED | OUTPATIENT
Start: 2021-03-10 | End: 2021-05-14

## 2021-03-10 RX ORDER — LORATADINE 10 MG/1
1 CAPSULE, LIQUID FILLED ORAL DAILY
Qty: 30 CAPSULE | Refills: 0 | Status: SHIPPED | OUTPATIENT
Start: 2021-03-10 | End: 2021-04-09

## 2021-03-10 NOTE — PROGRESS NOTES
HPI:   Patient presents with:  Blood Pressure      Romy Teddy is a 76year old female presenting for:    HTN/HLD  -Taking lisinopril.  Not taking statin  -Denies CP, Palpitations, Dizziness, leg edema, SOB, LOC, HAs     Osteoporosis-> stopped 01/31/2020 12:00 AM     (H) 01/31/2020 12:00 AM    NONHDLC 139 (H) 01/31/2020 12:00 AM       Lab Results   Component Value Date/Time     (H) 01/31/2020 12:00 AM     01/31/2020 12:00 AM    K 4.9 01/31/2020 12:00 AM     01/31/2020 1 insomnia 2018   • Shingles          Past Surgical History:   Procedure Laterality Date   •      • COLONOSCOPY  2010    external hemorrhoids, redundant/tortuous colon, sigmoid diverticulosis, and melanosis coli   • EGD  2010 Abdominal: Soft. Bowel sounds are normal. She exhibits no distension. There is no abdominal tenderness. Neurological: No cranial nerve deficit. Skin: No rash noted.            ASSESSMENT AND PLAN:   Patient is a 76year old female who presents primari capsule by mouth daily. • Fluticasone Propionate 50 MCG/ACT Nasal Suspension 1 Bottle 0     Si sprays by Each Nare route daily. No orders of the defined types were placed in this encounter.       Imaging & Consults:  ENT - INTERNAL  ENDOCRINOLOG

## 2021-03-17 ENCOUNTER — TELEPHONE (OUTPATIENT)
Dept: FAMILY MEDICINE CLINIC | Facility: CLINIC | Age: 76
End: 2021-03-17

## 2021-03-17 DIAGNOSIS — I10 ESSENTIAL HYPERTENSION: ICD-10-CM

## 2021-03-17 RX ORDER — LISINOPRIL 20 MG/1
TABLET ORAL
Qty: 30 TABLET | Refills: 0 | Status: SHIPPED | OUTPATIENT
Start: 2021-03-17 | End: 2021-04-15

## 2021-03-17 NOTE — TELEPHONE ENCOUNTER
Pt called requesting refills for lisinopril 20 MG Oral Tab  Pt is down to 2 pills   Pt asked for this to be sent to [facility] pharmacy/ for a 90 day supply    Please inform pt when this has been sent

## 2021-03-23 ENCOUNTER — TELEPHONE (OUTPATIENT)
Dept: FAMILY MEDICINE CLINIC | Facility: CLINIC | Age: 76
End: 2021-03-23

## 2021-04-13 ENCOUNTER — OFFICE VISIT (OUTPATIENT)
Dept: OBGYN CLINIC | Facility: CLINIC | Age: 76
End: 2021-04-13
Payer: MEDICARE

## 2021-04-13 VITALS — DIASTOLIC BLOOD PRESSURE: 66 MMHG | BODY MASS INDEX: 32 KG/M2 | SYSTOLIC BLOOD PRESSURE: 122 MMHG | WEIGHT: 177 LBS

## 2021-04-13 DIAGNOSIS — Z01.419 ENCOUNTER FOR GYNECOLOGICAL EXAMINATION WITHOUT ABNORMAL FINDING: ICD-10-CM

## 2021-04-13 DIAGNOSIS — Z12.31 ENCOUNTER FOR SCREENING MAMMOGRAM FOR BREAST CANCER: Primary | ICD-10-CM

## 2021-04-13 PROCEDURE — 99397 PER PM REEVAL EST PAT 65+ YR: CPT | Performed by: OBSTETRICS & GYNECOLOGY

## 2021-04-13 PROCEDURE — 3074F SYST BP LT 130 MM HG: CPT | Performed by: OBSTETRICS & GYNECOLOGY

## 2021-04-13 PROCEDURE — 3078F DIAST BP <80 MM HG: CPT | Performed by: OBSTETRICS & GYNECOLOGY

## 2021-04-13 NOTE — PROGRESS NOTES
HPI/Subjective:   Patient ID: Phyllis Heard is a 76year old female. Patient here for routine exam.  No C/Os. Needs mammogram order. All questions answered. Patient stopped taking Alondronate due to throat symptoms.   Taking Calcium and Vitam Physical Exam  Constitutional:       Appearance: Normal appearance. She is well-developed. She is obese. HENT:      Head: Normocephalic. Neck:      Thyroid: No thyromegaly. Cardiovascular:      Rate and Rhythm: Normal rate and regular rhythm.

## 2021-04-15 ENCOUNTER — TELEPHONE (OUTPATIENT)
Dept: FAMILY MEDICINE CLINIC | Facility: CLINIC | Age: 76
End: 2021-04-15

## 2021-04-15 ENCOUNTER — TELEPHONE (OUTPATIENT)
Dept: CASE MANAGEMENT | Age: 76
End: 2021-04-15

## 2021-04-15 DIAGNOSIS — I10 ESSENTIAL HYPERTENSION: ICD-10-CM

## 2021-04-15 RX ORDER — LISINOPRIL 20 MG/1
TABLET ORAL
Qty: 90 TABLET | Refills: 0 | Status: SHIPPED | OUTPATIENT
Start: 2021-04-15 | End: 2021-05-07

## 2021-04-15 NOTE — TELEPHONE ENCOUNTER
Patient is calling because she would like a three month refill on the following medication:lisinopril 20 MG Oral Tab sent to [address], [address] [place] [address] [phone], [phone].  Patient would like 90 days worth so

## 2021-05-06 DIAGNOSIS — I10 ESSENTIAL HYPERTENSION: ICD-10-CM

## 2021-05-07 RX ORDER — LISINOPRIL 20 MG/1
TABLET ORAL
Qty: 30 TABLET | Refills: 0 | Status: SHIPPED | OUTPATIENT
Start: 2021-05-07 | End: 2021-07-07

## 2021-05-14 ENCOUNTER — OFFICE VISIT (OUTPATIENT)
Dept: FAMILY MEDICINE CLINIC | Facility: CLINIC | Age: 76
End: 2021-05-14
Payer: MEDICARE

## 2021-05-14 VITALS
SYSTOLIC BLOOD PRESSURE: 118 MMHG | DIASTOLIC BLOOD PRESSURE: 64 MMHG | BODY MASS INDEX: 32.57 KG/M2 | WEIGHT: 177 LBS | HEIGHT: 62 IN | HEART RATE: 73 BPM

## 2021-05-14 DIAGNOSIS — H53.9 VISION DISORDER: ICD-10-CM

## 2021-05-14 DIAGNOSIS — E66.3 PATIENT OVERWEIGHT: ICD-10-CM

## 2021-05-14 DIAGNOSIS — M54.9 OTHER CHRONIC BACK PAIN: ICD-10-CM

## 2021-05-14 DIAGNOSIS — G89.29 OTHER CHRONIC BACK PAIN: ICD-10-CM

## 2021-05-14 DIAGNOSIS — I10 ESSENTIAL HYPERTENSION: ICD-10-CM

## 2021-05-14 DIAGNOSIS — R10.9 ABDOMINAL DISCOMFORT: ICD-10-CM

## 2021-05-14 DIAGNOSIS — R13.10 DYSPHAGIA, UNSPECIFIED TYPE: ICD-10-CM

## 2021-05-14 DIAGNOSIS — R53.83 OTHER FATIGUE: Primary | ICD-10-CM

## 2021-05-14 PROBLEM — M86.9 OSTEITIS PUBIS (HCC): Status: RESOLVED | Noted: 2018-02-14 | Resolved: 2021-05-14

## 2021-05-14 PROCEDURE — 3008F BODY MASS INDEX DOCD: CPT | Performed by: FAMILY MEDICINE

## 2021-05-14 PROCEDURE — 99204 OFFICE O/P NEW MOD 45 MIN: CPT | Performed by: FAMILY MEDICINE

## 2021-05-14 PROCEDURE — 3078F DIAST BP <80 MM HG: CPT | Performed by: FAMILY MEDICINE

## 2021-05-14 PROCEDURE — 3074F SYST BP LT 130 MM HG: CPT | Performed by: FAMILY MEDICINE

## 2021-05-14 RX ORDER — DULOXETIN HYDROCHLORIDE 20 MG/1
20 CAPSULE, DELAYED RELEASE ORAL DAILY
Qty: 90 CAPSULE | Refills: 0 | Status: SHIPPED | OUTPATIENT
Start: 2021-05-14 | End: 2021-09-30

## 2021-05-14 RX ORDER — MELOXICAM 7.5 MG/1
7.5 TABLET ORAL DAILY
Qty: 50 TABLET | Refills: 0 | Status: SHIPPED | OUTPATIENT
Start: 2021-05-14 | End: 2021-09-30

## 2021-05-14 RX ORDER — PANTOPRAZOLE SODIUM 40 MG/1
40 TABLET, DELAYED RELEASE ORAL
Qty: 90 TABLET | Refills: 0 | Status: SHIPPED | OUTPATIENT
Start: 2021-05-14

## 2021-05-16 NOTE — PROGRESS NOTES
HPI/Subjective:   Patient ID: Meg Jasso is a 76year old female. Patient here first time. History reviewed.    Complains about back pain for quite some time its low back with some radiation to the legs  Also complains that sometimes feels th well-developed. Cardiovascular:      Rate and Rhythm: Normal rate and regular rhythm. Heart sounds: Normal heart sounds. Pulmonary:      Effort: Pulmonary effort is normal.      Breath sounds: Normal breath sounds.    Abdominal:      General: Bowel

## 2021-05-20 ENCOUNTER — LAB ENCOUNTER (OUTPATIENT)
Dept: LAB | Age: 76
End: 2021-05-20
Attending: FAMILY MEDICINE
Payer: MEDICARE

## 2021-05-20 PROCEDURE — 36415 COLL VENOUS BLD VENIPUNCTURE: CPT | Performed by: FAMILY MEDICINE

## 2021-05-20 PROCEDURE — 80061 LIPID PANEL: CPT | Performed by: FAMILY MEDICINE

## 2021-05-20 PROCEDURE — 83036 HEMOGLOBIN GLYCOSYLATED A1C: CPT | Performed by: FAMILY MEDICINE

## 2021-05-20 PROCEDURE — 85025 COMPLETE CBC W/AUTO DIFF WBC: CPT | Performed by: FAMILY MEDICINE

## 2021-05-20 PROCEDURE — 84443 ASSAY THYROID STIM HORMONE: CPT | Performed by: FAMILY MEDICINE

## 2021-05-20 PROCEDURE — 80053 COMPREHEN METABOLIC PANEL: CPT | Performed by: FAMILY MEDICINE

## 2021-05-27 ENCOUNTER — TELEPHONE (OUTPATIENT)
Dept: FAMILY MEDICINE CLINIC | Facility: CLINIC | Age: 76
End: 2021-05-27

## 2021-05-27 DIAGNOSIS — Z01.00 ROUTINE EYE EXAM: Primary | ICD-10-CM

## 2021-05-27 NOTE — TELEPHONE ENCOUNTER
Patient is requesting a new referral for Dr. Qi Nova for her yearly eye exam. Please advise. Patient has an appointment scheduled 6/2. Patient is requesting 2 visits. Please fax referral to Dr. Alondra Charles office.  Patient does not have fax number av

## 2021-06-15 ENCOUNTER — OFFICE VISIT (OUTPATIENT)
Dept: FAMILY MEDICINE CLINIC | Facility: CLINIC | Age: 76
End: 2021-06-15
Payer: MEDICARE

## 2021-06-15 VITALS
DIASTOLIC BLOOD PRESSURE: 69 MMHG | WEIGHT: 178 LBS | HEART RATE: 59 BPM | BODY MASS INDEX: 32.76 KG/M2 | SYSTOLIC BLOOD PRESSURE: 109 MMHG | HEIGHT: 62 IN

## 2021-06-15 DIAGNOSIS — L65.9 HAIR LOSS: ICD-10-CM

## 2021-06-15 DIAGNOSIS — M25.511 RIGHT SHOULDER PAIN, UNSPECIFIED CHRONICITY: Primary | ICD-10-CM

## 2021-06-15 PROCEDURE — 3078F DIAST BP <80 MM HG: CPT | Performed by: FAMILY MEDICINE

## 2021-06-15 PROCEDURE — 99213 OFFICE O/P EST LOW 20 MIN: CPT | Performed by: FAMILY MEDICINE

## 2021-06-15 PROCEDURE — 3074F SYST BP LT 130 MM HG: CPT | Performed by: FAMILY MEDICINE

## 2021-06-15 PROCEDURE — 3008F BODY MASS INDEX DOCD: CPT | Performed by: FAMILY MEDICINE

## 2021-06-20 NOTE — PROGRESS NOTES
HPI/Subjective:   Patient ID: Kimberly Monge is a 76year old female. Patient here for f/u . Continues with neck and shoulder pain. No radiation of the pain nor any neuro deficits. Did not start physical therapy.    Nor medication that she was Effort: Pulmonary effort is normal. No respiratory distress. Breath sounds: Normal breath sounds. No wheezing or rales. Abdominal:      General: There is no distension. Palpations: Abdomen is soft. Tenderness:  There is no abdominal tendern

## 2021-07-07 DIAGNOSIS — I10 ESSENTIAL HYPERTENSION: ICD-10-CM

## 2021-07-07 RX ORDER — LISINOPRIL 20 MG/1
20 TABLET ORAL DAILY
Qty: 90 TABLET | Refills: 0 | Status: SHIPPED | OUTPATIENT
Start: 2021-07-07 | End: 2021-10-06

## 2021-07-07 NOTE — TELEPHONE ENCOUNTER
Iranian Speaking - Patient is requesting refill of medications LISINOPRIL 20 MG Oral Tab and Pantoprazole Sodium (PROTONIX) 40 MG Oral Tab EC. Patient states she has no refills left.

## 2021-09-30 ENCOUNTER — OFFICE VISIT (OUTPATIENT)
Dept: PHYSICAL MEDICINE AND REHAB | Facility: CLINIC | Age: 76
End: 2021-09-30
Payer: MEDICARE

## 2021-09-30 ENCOUNTER — HOSPITAL ENCOUNTER (OUTPATIENT)
Dept: GENERAL RADIOLOGY | Facility: HOSPITAL | Age: 76
Discharge: HOME | End: 2021-09-30
Attending: PHYSICAL MEDICINE & REHABILITATION
Payer: MEDICARE

## 2021-09-30 VITALS
SYSTOLIC BLOOD PRESSURE: 120 MMHG | DIASTOLIC BLOOD PRESSURE: 70 MMHG | BODY MASS INDEX: 33.13 KG/M2 | HEART RATE: 80 BPM | WEIGHT: 180 LBS | OXYGEN SATURATION: 98 % | HEIGHT: 62 IN | RESPIRATION RATE: 16 BRPM

## 2021-09-30 DIAGNOSIS — M47.812 CERVICAL FACET SYNDROME: ICD-10-CM

## 2021-09-30 DIAGNOSIS — M67.919 TENDINOPATHY OF ROTATOR CUFF, UNSPECIFIED LATERALITY: ICD-10-CM

## 2021-09-30 DIAGNOSIS — M50.30 DDD (DEGENERATIVE DISC DISEASE), CERVICAL: ICD-10-CM

## 2021-09-30 DIAGNOSIS — M54.2 TRIGGER POINT OF NECK: ICD-10-CM

## 2021-09-30 DIAGNOSIS — K29.60 NSAID INDUCED GASTRITIS: ICD-10-CM

## 2021-09-30 DIAGNOSIS — T39.395A NSAID INDUCED GASTRITIS: ICD-10-CM

## 2021-09-30 DIAGNOSIS — E78.5 HYPERLIPIDEMIA, UNSPECIFIED HYPERLIPIDEMIA TYPE: ICD-10-CM

## 2021-09-30 DIAGNOSIS — G89.29 CHRONIC RIGHT SHOULDER PAIN: ICD-10-CM

## 2021-09-30 DIAGNOSIS — M48.02 FORAMINAL STENOSIS OF CERVICAL REGION: ICD-10-CM

## 2021-09-30 DIAGNOSIS — M25.511 CHRONIC RIGHT SHOULDER PAIN: ICD-10-CM

## 2021-09-30 DIAGNOSIS — M50.30 DEGENERATION OF INTERVERTEBRAL DISC OF CERVICAL REGION WITH OSTEOPHYTE OF CERVICAL VERTEBRA: ICD-10-CM

## 2021-09-30 DIAGNOSIS — M25.78 DEGENERATION OF INTERVERTEBRAL DISC OF CERVICAL REGION WITH OSTEOPHYTE OF CERVICAL VERTEBRA: Primary | ICD-10-CM

## 2021-09-30 DIAGNOSIS — M25.78 DEGENERATION OF INTERVERTEBRAL DISC OF CERVICAL REGION WITH OSTEOPHYTE OF CERVICAL VERTEBRA: ICD-10-CM

## 2021-09-30 DIAGNOSIS — M50.30 DEGENERATION OF INTERVERTEBRAL DISC OF CERVICAL REGION WITH OSTEOPHYTE OF CERVICAL VERTEBRA: Primary | ICD-10-CM

## 2021-09-30 PROCEDURE — 73030 X-RAY EXAM OF SHOULDER: CPT | Performed by: PHYSICAL MEDICINE & REHABILITATION

## 2021-09-30 PROCEDURE — 99204 OFFICE O/P NEW MOD 45 MIN: CPT | Performed by: PHYSICAL MEDICINE & REHABILITATION

## 2021-09-30 PROCEDURE — 3008F BODY MASS INDEX DOCD: CPT | Performed by: PHYSICAL MEDICINE & REHABILITATION

## 2021-09-30 PROCEDURE — 3078F DIAST BP <80 MM HG: CPT | Performed by: PHYSICAL MEDICINE & REHABILITATION

## 2021-09-30 PROCEDURE — 72050 X-RAY EXAM NECK SPINE 4/5VWS: CPT | Performed by: PHYSICAL MEDICINE & REHABILITATION

## 2021-09-30 PROCEDURE — 3074F SYST BP LT 130 MM HG: CPT | Performed by: PHYSICAL MEDICINE & REHABILITATION

## 2021-09-30 RX ORDER — NAPROXEN 500 MG/1
500 TABLET ORAL 2 TIMES DAILY WITH MEALS
Qty: 60 TABLET | Refills: 0 | Status: SHIPPED | OUTPATIENT
Start: 2021-09-30

## 2021-09-30 NOTE — PATIENT INSTRUCTIONS
1) Get XR of the cervical spine and right shoulder today on your way out  2) Begin formal physical therapy at the [facility] in [place]   3) Take Naprosyn 500 mg 1 tablet twice per day with food for the next two weeks and then as needed bu

## 2021-09-30 NOTE — H&P
[address]  Progress Note    CHIEF COMPLAINT:  Patient presents with:  New Patient: c/o head pain on right side radiates down right shoulder and elbow. no prevous Xrays.  no previous PT. no previous injection Vaping Use: Never used    Substance and Sexual Activity      Alcohol use: No      Drug use: No      Sexual activity: Not on file      FAMILY HISTORY:   Family History   Problem Relation Age of Onset   • Arthritis Father    • Cancer Mother         uterine C Body mass index is 32.92 kg/m². General: No immediate distress  Head: Normocephalic/ Atraumatic  Eyes: Extra-occular movements intact. Ears: No auricular hematoma or deformities  Mouth: No lesions or ulcerations  Heart: peripheral pulses intact. mg/dL Final   • Calculated Osmolality 05/20/2021 297* 275 - 295 mOsm/kg Final   • GFR, Non- 05/20/2021 86  >=60 Final   • GFR, -American 05/20/2021 99  >=60 Final   • ALT 05/20/2021 19  13 - 56 U/L Final   • AST 05/20/2021 19  15 - 3 x10(3) uL Final   • Basophil Absolute 05/20/2021 0.02  0.00 - 0.20 x10(3) uL Final   • Immature Granulocyte Absolute 05/20/2021 0.01  0.00 - 1.00 x10(3) uL Final   • Neutrophil % 05/20/2021 43.0  % Final   • Lymphocyte % 05/20/2021 38.0  % Final   • Monocy Purvis

## 2021-10-06 DIAGNOSIS — I10 ESSENTIAL HYPERTENSION: ICD-10-CM

## 2021-10-06 RX ORDER — LISINOPRIL 20 MG/1
20 TABLET ORAL DAILY
Qty: 90 TABLET | Refills: 1 | Status: SHIPPED | OUTPATIENT
Start: 2021-10-06 | End: 2022-02-15

## 2021-10-06 NOTE — TELEPHONE ENCOUNTER
Refill passed per [place] [facility] protocol. Requested Prescriptions   Pending Prescriptions Disp Refills    LISINOPRIL 20 MG Oral Tab [Pharmacy Med Name: LISINOPRIL 20 MG Tablet] 90 tablet 0     Sig: TOME 1 Ibis Salomon        Hypertensive Medications Protocol Passed - 10/6/2021  4:36 PM        Passed - CMP or BMP in past 12 months        Passed - Appointment in past 6 or next 3 months        Passed - GFR Non- > 50     Lab Results   Component Value Date    GFRNAA 86 05/20/2021                        Future Appointments         Provider Department Appt Notes    In 1 month Radha Vaughan  [facility] Return in about 6 weeks (around 11/11/2021).             Recent Outpatient Visits              6 days ago Degeneration of intervertebral disc of cervical region with osteophyte of cervical vertebra    [facility] Radha Vaughan MD    Office Visit    3 months ago Right shoulder pain, unspecified chronicity    uRi Morton MD    Office Visit    4 months ago Other fatigue    Rui Morton MD    Office Visit    5 months ago Encounter for screening mammogram for breast cancer    Randall Schaefer, Diego Khan MD    Office Visit    7 months ago Age-related osteoporosis without current pathological fracture    Sharee Thorpe MD    Office Visit

## 2021-10-20 ENCOUNTER — MED REC SCAN ONLY (OUTPATIENT)
Dept: PHYSICAL MEDICINE AND REHAB | Facility: CLINIC | Age: 76
End: 2021-10-20

## 2022-01-11 ENCOUNTER — MED REC SCAN ONLY (OUTPATIENT)
Dept: NEUROLOGY | Facility: CLINIC | Age: 77
End: 2022-01-11

## 2022-02-15 ENCOUNTER — OFFICE VISIT (OUTPATIENT)
Dept: FAMILY MEDICINE CLINIC | Facility: CLINIC | Age: 77
End: 2022-02-15
Payer: MEDICARE

## 2022-02-15 VITALS
BODY MASS INDEX: 32.57 KG/M2 | WEIGHT: 177 LBS | HEIGHT: 62 IN | SYSTOLIC BLOOD PRESSURE: 118 MMHG | HEART RATE: 74 BPM | DIASTOLIC BLOOD PRESSURE: 74 MMHG

## 2022-02-15 DIAGNOSIS — M62.830 SPASM OF THORACIC BACK MUSCLE: ICD-10-CM

## 2022-02-15 DIAGNOSIS — I10 ESSENTIAL HYPERTENSION: ICD-10-CM

## 2022-02-15 DIAGNOSIS — M81.0 OSTEOPOROSIS, UNSPECIFIED OSTEOPOROSIS TYPE, UNSPECIFIED PATHOLOGICAL FRACTURE PRESENCE: Primary | ICD-10-CM

## 2022-02-15 DIAGNOSIS — M25.50 ARTHRALGIA, UNSPECIFIED JOINT: ICD-10-CM

## 2022-02-15 DIAGNOSIS — Z12.31 ENCOUNTER FOR SCREENING MAMMOGRAM FOR BREAST CANCER: ICD-10-CM

## 2022-02-15 PROCEDURE — 3008F BODY MASS INDEX DOCD: CPT | Performed by: FAMILY MEDICINE

## 2022-02-15 PROCEDURE — 3074F SYST BP LT 130 MM HG: CPT | Performed by: FAMILY MEDICINE

## 2022-02-15 PROCEDURE — 99214 OFFICE O/P EST MOD 30 MIN: CPT | Performed by: FAMILY MEDICINE

## 2022-02-15 PROCEDURE — 3078F DIAST BP <80 MM HG: CPT | Performed by: FAMILY MEDICINE

## 2022-02-15 RX ORDER — DULOXETIN HYDROCHLORIDE 20 MG/1
20 CAPSULE, DELAYED RELEASE ORAL DAILY
Qty: 90 CAPSULE | Refills: 1 | Status: SHIPPED | OUTPATIENT
Start: 2022-02-15

## 2022-02-15 RX ORDER — GABAPENTIN 100 MG/1
100 CAPSULE ORAL 2 TIMES DAILY
Qty: 180 CAPSULE | Refills: 0 | Status: SHIPPED | OUTPATIENT
Start: 2022-02-15

## 2022-02-15 RX ORDER — LISINOPRIL 20 MG/1
20 TABLET ORAL DAILY
Qty: 90 TABLET | Refills: 1 | Status: SHIPPED | OUTPATIENT
Start: 2022-02-15

## 2022-02-24 ENCOUNTER — HOSPITAL ENCOUNTER (OUTPATIENT)
Dept: MAMMOGRAPHY | Facility: HOSPITAL | Age: 77
Discharge: HOME | End: 2022-02-24
Attending: FAMILY MEDICINE
Payer: MEDICARE

## 2022-02-24 DIAGNOSIS — Z12.31 ENCOUNTER FOR SCREENING MAMMOGRAM FOR BREAST CANCER: ICD-10-CM

## 2022-02-24 PROCEDURE — 77063 BREAST TOMOSYNTHESIS BI: CPT | Performed by: FAMILY MEDICINE

## 2022-02-24 PROCEDURE — 77067 SCR MAMMO BI INCL CAD: CPT | Performed by: FAMILY MEDICINE

## 2022-03-08 ENCOUNTER — HOSPITAL ENCOUNTER (OUTPATIENT)
Dept: BONE DENSITY | Age: 77
Discharge: HOME | End: 2022-03-08
Attending: FAMILY MEDICINE
Payer: MEDICARE

## 2022-03-08 DIAGNOSIS — M81.0 OSTEOPOROSIS, UNSPECIFIED OSTEOPOROSIS TYPE, UNSPECIFIED PATHOLOGICAL FRACTURE PRESENCE: ICD-10-CM

## 2022-03-08 PROCEDURE — 77080 DXA BONE DENSITY AXIAL: CPT | Performed by: FAMILY MEDICINE

## 2022-03-15 ENCOUNTER — OFFICE VISIT (OUTPATIENT)
Dept: FAMILY MEDICINE CLINIC | Facility: CLINIC | Age: 77
End: 2022-03-15
Payer: MEDICARE

## 2022-03-15 VITALS
BODY MASS INDEX: 32.39 KG/M2 | HEIGHT: 62 IN | HEART RATE: 84 BPM | DIASTOLIC BLOOD PRESSURE: 74 MMHG | SYSTOLIC BLOOD PRESSURE: 116 MMHG | WEIGHT: 176 LBS

## 2022-03-15 DIAGNOSIS — R26.89 BALANCE DISORDER: Primary | ICD-10-CM

## 2022-03-15 PROCEDURE — 3008F BODY MASS INDEX DOCD: CPT | Performed by: FAMILY MEDICINE

## 2022-03-15 PROCEDURE — 3078F DIAST BP <80 MM HG: CPT | Performed by: FAMILY MEDICINE

## 2022-03-15 PROCEDURE — 3074F SYST BP LT 130 MM HG: CPT | Performed by: FAMILY MEDICINE

## 2022-03-15 PROCEDURE — 99213 OFFICE O/P EST LOW 20 MIN: CPT | Performed by: FAMILY MEDICINE

## 2022-05-03 ENCOUNTER — OFFICE VISIT (OUTPATIENT)
Dept: FAMILY MEDICINE CLINIC | Facility: CLINIC | Age: 77
End: 2022-05-03
Payer: MEDICARE

## 2022-05-03 VITALS
HEIGHT: 62 IN | DIASTOLIC BLOOD PRESSURE: 80 MMHG | BODY MASS INDEX: 32.02 KG/M2 | WEIGHT: 174 LBS | SYSTOLIC BLOOD PRESSURE: 122 MMHG | HEART RATE: 72 BPM

## 2022-05-03 DIAGNOSIS — L98.9 SKIN LESION: ICD-10-CM

## 2022-05-03 DIAGNOSIS — H90.3 SENSORINEURAL HEARING LOSS (SNHL) OF BOTH EARS: ICD-10-CM

## 2022-05-03 DIAGNOSIS — M47.812 CERVICAL FACET SYNDROME: ICD-10-CM

## 2022-05-03 DIAGNOSIS — H93.13 TINNITUS OF BOTH EARS: ICD-10-CM

## 2022-05-03 DIAGNOSIS — E78.5 HYPERLIPIDEMIA, UNSPECIFIED HYPERLIPIDEMIA TYPE: ICD-10-CM

## 2022-05-03 DIAGNOSIS — H91.90 HEARING DIFFICULTY, UNSPECIFIED LATERALITY: ICD-10-CM

## 2022-05-03 DIAGNOSIS — I10 ESSENTIAL HYPERTENSION: ICD-10-CM

## 2022-05-03 DIAGNOSIS — Z00.00 ENCOUNTER FOR ANNUAL HEALTH EXAMINATION: ICD-10-CM

## 2022-05-03 DIAGNOSIS — M25.561 RIGHT KNEE PAIN, UNSPECIFIED CHRONICITY: Primary | ICD-10-CM

## 2022-05-03 DIAGNOSIS — H40.89 OTHER SPECIFIED GLAUCOMA, UNSPECIFIED LATERALITY: ICD-10-CM

## 2022-05-03 DIAGNOSIS — H47.20 RIGHT OPTIC NERVE ATROPHY: ICD-10-CM

## 2022-05-03 DIAGNOSIS — M15.9 PRIMARY OSTEOARTHRITIS INVOLVING MULTIPLE JOINTS: ICD-10-CM

## 2022-05-03 DIAGNOSIS — M79.641 HAND PAIN, RIGHT: ICD-10-CM

## 2022-05-03 PROCEDURE — 3008F BODY MASS INDEX DOCD: CPT | Performed by: FAMILY MEDICINE

## 2022-05-03 PROCEDURE — G0439 PPPS, SUBSEQ VISIT: HCPCS | Performed by: FAMILY MEDICINE

## 2022-05-03 PROCEDURE — 3074F SYST BP LT 130 MM HG: CPT | Performed by: FAMILY MEDICINE

## 2022-05-03 PROCEDURE — 3079F DIAST BP 80-89 MM HG: CPT | Performed by: FAMILY MEDICINE

## 2022-05-03 PROCEDURE — 96160 PT-FOCUSED HLTH RISK ASSMT: CPT | Performed by: FAMILY MEDICINE

## 2022-05-03 PROCEDURE — 99397 PER PM REEVAL EST PAT 65+ YR: CPT | Performed by: FAMILY MEDICINE

## 2022-05-03 RX ORDER — NAPROXEN 500 MG/1
500 TABLET ORAL 2 TIMES DAILY WITH MEALS
Qty: 60 TABLET | Refills: 1 | Status: SHIPPED | OUTPATIENT
Start: 2022-05-03

## 2022-05-17 ENCOUNTER — TELEPHONE (OUTPATIENT)
Dept: FAMILY MEDICINE CLINIC | Facility: CLINIC | Age: 77
End: 2022-05-17

## 2022-06-07 ENCOUNTER — OFFICE VISIT (OUTPATIENT)
Dept: FAMILY MEDICINE CLINIC | Facility: CLINIC | Age: 77
End: 2022-06-07
Payer: MEDICARE

## 2022-06-07 VITALS
BODY MASS INDEX: 32.76 KG/M2 | WEIGHT: 178 LBS | DIASTOLIC BLOOD PRESSURE: 50 MMHG | HEART RATE: 66 BPM | SYSTOLIC BLOOD PRESSURE: 81 MMHG | HEIGHT: 62 IN

## 2022-06-07 DIAGNOSIS — R51.9 NONINTRACTABLE EPISODIC HEADACHE, UNSPECIFIED HEADACHE TYPE: Primary | ICD-10-CM

## 2022-06-07 DIAGNOSIS — H93.19 TINNITUS, UNSPECIFIED LATERALITY: ICD-10-CM

## 2022-06-07 PROCEDURE — 3008F BODY MASS INDEX DOCD: CPT | Performed by: FAMILY MEDICINE

## 2022-06-07 PROCEDURE — 1126F AMNT PAIN NOTED NONE PRSNT: CPT | Performed by: FAMILY MEDICINE

## 2022-06-07 PROCEDURE — 99214 OFFICE O/P EST MOD 30 MIN: CPT | Performed by: FAMILY MEDICINE

## 2022-06-07 PROCEDURE — 3074F SYST BP LT 130 MM HG: CPT | Performed by: FAMILY MEDICINE

## 2022-06-07 PROCEDURE — 3078F DIAST BP <80 MM HG: CPT | Performed by: FAMILY MEDICINE

## 2022-06-16 ENCOUNTER — OFFICE VISIT (OUTPATIENT)
Dept: AUDIOLOGY | Facility: CLINIC | Age: 77
End: 2022-06-16
Payer: MEDICARE

## 2022-06-16 ENCOUNTER — OFFICE VISIT (OUTPATIENT)
Dept: OTOLARYNGOLOGY | Facility: CLINIC | Age: 77
End: 2022-06-16
Payer: MEDICARE

## 2022-06-16 VITALS — HEIGHT: 62 IN | BODY MASS INDEX: 32.76 KG/M2 | WEIGHT: 178 LBS

## 2022-06-16 DIAGNOSIS — H93.13 TINNITUS OF BOTH EARS: Primary | ICD-10-CM

## 2022-06-16 DIAGNOSIS — H93.19 TINNITUS, UNSPECIFIED LATERALITY: ICD-10-CM

## 2022-06-16 DIAGNOSIS — H90.3 SENSORINEURAL HEARING LOSS, BILATERAL: ICD-10-CM

## 2022-06-16 DIAGNOSIS — R07.0 THROAT DISCOMFORT: ICD-10-CM

## 2022-06-16 PROCEDURE — 92557 COMPREHENSIVE HEARING TEST: CPT | Performed by: AUDIOLOGIST

## 2022-06-16 PROCEDURE — 92567 TYMPANOMETRY: CPT | Performed by: AUDIOLOGIST

## 2022-06-16 PROCEDURE — 99203 OFFICE O/P NEW LOW 30 MIN: CPT | Performed by: OTOLARYNGOLOGY

## 2022-06-16 PROCEDURE — 31575 DIAGNOSTIC LARYNGOSCOPY: CPT | Performed by: OTOLARYNGOLOGY

## 2022-06-16 PROCEDURE — 3008F BODY MASS INDEX DOCD: CPT | Performed by: OTOLARYNGOLOGY

## 2022-06-16 RX ORDER — FLUTICASONE PROPIONATE 50 MCG
1 SPRAY, SUSPENSION (ML) NASAL 2 TIMES DAILY
Qty: 16 G | Refills: 3 | Status: SHIPPED | OUTPATIENT
Start: 2022-06-16

## 2022-06-16 RX ORDER — OMEPRAZOLE 40 MG/1
40 CAPSULE, DELAYED RELEASE ORAL DAILY
Qty: 30 CAPSULE | Refills: 2 | Status: SHIPPED | OUTPATIENT
Start: 2022-06-16

## 2022-06-16 RX ORDER — LORATADINE 10 MG/1
10 TABLET ORAL DAILY
Qty: 30 TABLET | Refills: 3 | Status: SHIPPED | OUTPATIENT
Start: 2022-06-16

## 2022-06-16 RX ORDER — MONTELUKAST SODIUM 10 MG/1
10 TABLET ORAL NIGHTLY
Qty: 30 TABLET | Refills: 3 | Status: SHIPPED | OUTPATIENT
Start: 2022-06-16

## 2022-06-29 RX ORDER — NAPROXEN 500 MG/1
TABLET ORAL
Qty: 120 TABLET | Refills: 0 | OUTPATIENT
Start: 2022-06-29

## 2022-06-29 NOTE — TELEPHONE ENCOUNTER
Spoke to Kyle Morton, son on MERYL. Informed him that patient will need to have fasting labs done before medication can be refilled. Lab orders in chart. He will inform patient. Evgeny Finnegan MD  Em Rn Triage 12 hours ago (10:27 PM)     Please call her.  She needs a blood test prior to refill of medication   Order is in

## 2022-06-29 NOTE — TELEPHONE ENCOUNTER
Patient contacted via SANDRA Adrian  [facility] ID #[id number]. Left message to call back on patient's number, son Ryan's number, and son Orville's number.

## 2022-07-13 ENCOUNTER — LAB ENCOUNTER (OUTPATIENT)
Dept: LAB | Facility: HOSPITAL | Age: 77
End: 2022-07-13
Attending: FAMILY MEDICINE
Payer: MEDICARE

## 2022-07-13 LAB
ALBUMIN SERPL-MCNC: 3.5 G/DL (ref 3.4–5)
ALBUMIN/GLOB SERPL: 1.2 {RATIO} (ref 1–2)
ALP LIVER SERPL-CCNC: 51 U/L
ALT SERPL-CCNC: 22 U/L
ANION GAP SERPL CALC-SCNC: 4 MMOL/L (ref 0–18)
AST SERPL-CCNC: 23 U/L (ref 15–37)
BASOPHILS # BLD AUTO: 0.04 X10(3) UL (ref 0–0.2)
BASOPHILS NFR BLD AUTO: 0.8 %
BILIRUB SERPL-MCNC: 0.6 MG/DL (ref 0.1–2)
BUN BLD-MCNC: 17 MG/DL (ref 7–18)
BUN/CREAT SERPL: 23.9 (ref 10–20)
CALCIUM BLD-MCNC: 9.1 MG/DL (ref 8.5–10.1)
CHLORIDE SERPL-SCNC: 110 MMOL/L (ref 98–112)
CHOLEST SERPL-MCNC: 178 MG/DL (ref ?–200)
CO2 SERPL-SCNC: 26 MMOL/L (ref 21–32)
CREAT BLD-MCNC: 0.71 MG/DL
DEPRECATED RDW RBC AUTO: 46.8 FL (ref 35.1–46.3)
EOSINOPHIL # BLD AUTO: 0.22 X10(3) UL (ref 0–0.7)
EOSINOPHIL NFR BLD AUTO: 4.5 %
ERYTHROCYTE [DISTWIDTH] IN BLOOD BY AUTOMATED COUNT: 13.1 % (ref 11–15)
FASTING PATIENT LIPID ANSWER: NO
FASTING STATUS PATIENT QL REPORTED: NO
GLOBULIN PLAS-MCNC: 3 G/DL (ref 2.8–4.4)
GLUCOSE BLD-MCNC: 93 MG/DL (ref 70–99)
HCT VFR BLD AUTO: 42 %
HDLC SERPL-MCNC: 57 MG/DL (ref 40–59)
HGB BLD-MCNC: 13.9 G/DL
IMM GRANULOCYTES # BLD AUTO: 0.01 X10(3) UL (ref 0–1)
IMM GRANULOCYTES NFR BLD: 0.2 %
LDLC SERPL CALC-MCNC: 102 MG/DL (ref ?–100)
LYMPHOCYTES # BLD AUTO: 1.53 X10(3) UL (ref 1–4)
LYMPHOCYTES NFR BLD AUTO: 31.6 %
MCH RBC QN AUTO: 32 PG (ref 26–34)
MCHC RBC AUTO-ENTMCNC: 33.1 G/DL (ref 31–37)
MCV RBC AUTO: 96.8 FL
MONOCYTES # BLD AUTO: 0.57 X10(3) UL (ref 0.1–1)
MONOCYTES NFR BLD AUTO: 11.8 %
NEUTROPHILS # BLD AUTO: 2.47 X10 (3) UL (ref 1.5–7.7)
NEUTROPHILS # BLD AUTO: 2.47 X10(3) UL (ref 1.5–7.7)
NEUTROPHILS NFR BLD AUTO: 51.1 %
NONHDLC SERPL-MCNC: 121 MG/DL (ref ?–130)
OSMOLALITY SERPL CALC.SUM OF ELEC: 291 MOSM/KG (ref 275–295)
PLATELET # BLD AUTO: 185 10(3)UL (ref 150–450)
POTASSIUM SERPL-SCNC: 4.4 MMOL/L (ref 3.5–5.1)
PROT SERPL-MCNC: 6.5 G/DL (ref 6.4–8.2)
RBC # BLD AUTO: 4.34 X10(6)UL
SODIUM SERPL-SCNC: 140 MMOL/L (ref 136–145)
TRIGL SERPL-MCNC: 106 MG/DL (ref 30–149)
TSI SER-ACNC: 1.71 MIU/ML (ref 0.36–3.74)
VLDLC SERPL CALC-MCNC: 18 MG/DL (ref 0–30)
WBC # BLD AUTO: 4.8 X10(3) UL (ref 4–11)

## 2022-07-13 PROCEDURE — 80053 COMPREHEN METABOLIC PANEL: CPT | Performed by: FAMILY MEDICINE

## 2022-07-13 PROCEDURE — 84443 ASSAY THYROID STIM HORMONE: CPT | Performed by: FAMILY MEDICINE

## 2022-07-13 PROCEDURE — 85025 COMPLETE CBC W/AUTO DIFF WBC: CPT | Performed by: FAMILY MEDICINE

## 2022-07-13 PROCEDURE — 36415 COLL VENOUS BLD VENIPUNCTURE: CPT | Performed by: FAMILY MEDICINE

## 2022-07-13 PROCEDURE — 80061 LIPID PANEL: CPT | Performed by: FAMILY MEDICINE

## 2022-07-21 ENCOUNTER — OFFICE VISIT (OUTPATIENT)
Dept: OTOLARYNGOLOGY | Facility: CLINIC | Age: 77
End: 2022-07-21
Payer: MEDICARE

## 2022-07-21 VITALS — HEIGHT: 62 IN | BODY MASS INDEX: 32.76 KG/M2 | WEIGHT: 178 LBS

## 2022-07-21 DIAGNOSIS — Z01.818 PRE-PROCEDURAL EXAMINATION: ICD-10-CM

## 2022-07-21 DIAGNOSIS — R13.10 DYSPHAGIA, UNSPECIFIED TYPE: Primary | ICD-10-CM

## 2022-07-21 PROCEDURE — 99213 OFFICE O/P EST LOW 20 MIN: CPT | Performed by: OTOLARYNGOLOGY

## 2022-07-21 PROCEDURE — 1126F AMNT PAIN NOTED NONE PRSNT: CPT | Performed by: OTOLARYNGOLOGY

## 2022-07-21 PROCEDURE — 3008F BODY MASS INDEX DOCD: CPT | Performed by: OTOLARYNGOLOGY

## 2022-07-21 PROCEDURE — 31575 DIAGNOSTIC LARYNGOSCOPY: CPT | Performed by: OTOLARYNGOLOGY

## 2022-08-04 RX ORDER — NAPROXEN 500 MG/1
TABLET ORAL
Qty: 120 TABLET | Refills: 0 | Status: SHIPPED | OUTPATIENT
Start: 2022-08-04

## 2022-09-14 DIAGNOSIS — I10 ESSENTIAL HYPERTENSION: ICD-10-CM

## 2022-09-15 RX ORDER — LISINOPRIL 20 MG/1
20 TABLET ORAL DAILY
Qty: 90 TABLET | Refills: 1 | Status: SHIPPED | OUTPATIENT
Start: 2022-09-15

## 2022-09-15 NOTE — TELEPHONE ENCOUNTER
Refill passed per [place], [facility] protocol.   Requested Prescriptions   Pending Prescriptions Disp Refills    LISINOPRIL 20 MG Oral Tab [Pharmacy Med Name: LISINOPRIL 20 MG Tablet] 90 tablet 1     Sig: TOME 1 Hank Lorenz        Hypertensive Medications Protocol Passed - 9/14/2022  1:39 PM        Passed - In person appointment in the past 12 or next 3 months       Recent Outpatient Visits              1 month ago Dysphagia, unspecified type    [facility], Justyna Brown Kirke Ash, MD    Office Visit    3 months ago Sensorineural hearing loss, bilateral    [facility] Audiology Sindhu Nam    Office Visit    3 months ago Tinnitus of both ears    [facility] Kirke Ash[facility]    Office Visit    3 months ago Nonintractable episodic headache, unspecified headache type    Edwind Spatz, MD    Office Visit    4 months ago Right knee pain, unspecified chronicity    Edwind Spatz, MD    Office Visit                 Passed - Last BP reading less than 140/90     BP Readings from Last 1 Encounters:  06/07/22 : (!) 81/50                Passed - CMP or BMP in past 6 months     Recent Results (from the past 4392 hour(s))   COMP METABOLIC PANEL (14)    Collection Time: 07/13/22 12:39 PM   Result Value Ref Range    Glucose 93 70 - 99 mg/dL    Sodium 140 136 - 145 mmol/L    Potassium 4.4 3.5 - 5.1 mmol/L    Chloride 110 98 - 112 mmol/L    CO2 26.0 21.0 - 32.0 mmol/L    Anion Gap 4 0 - 18 mmol/L    BUN 17 7 - 18 mg/dL    Creatinine 0.71 0.55 - 1.02 mg/dL    BUN/CREA Ratio 23.9 (H) 10.0 - 20.0    Calcium, Total 9.1 8.5 - 10.1 mg/dL    Calculated Osmolality 291 275 - 295 mOsm/kg    GFR, Non- 83 >=60    GFR, -American 96 >=60    ALT 22 13 - 56 U/L    AST 23 15 - 37 U/L    Alkaline Phosphatase 51 (L) 55 - 142 U/L Bilirubin, Total 0.6 0.1 - 2.0 mg/dL    Total Protein 6.5 6.4 - 8.2 g/dL    Albumin 3.5 3.4 - 5.0 g/dL    Globulin  3.0 2.8 - 4.4 g/dL    A/G Ratio 1.2 1.0 - 2.0    Patient Fasting for CMP? No      *Note: Due to a large number of results and/or encounters for the requested time period, some results have not been displayed. A complete set of results can be found in Results Review.                  Passed - In person appointment or virtual visit in the past 6 months       Recent Outpatient Visits              1 month ago Dysphagia, unspecified type    [facility], [address], Thony Jansen MD    Office Visit    3 months ago Sensorineural hearing loss, bilateral    [facility] Audiology Sindhu Tenorio    Office Visit    3 months ago Tinnitus of both [place] [address] [place] [address], [address] [facility] [address], Thony Jansen MD    Office Visit    3 months ago Nonintractable episodic headache, unspecified headache type    [facility], [address] MD    Office Visit    4 months ago Right knee pain, unspecified chronicity    [place] Imogene Leyden, MD    Office Visit                 Passed - EGFRCR or GFRNAA > 50     GFR Evaluation  GFRNAA: 83 , resulted on 7/13/2022                Recent Outpatient Visits              1 month ago Dysphagia, unspecified type    [facility], [address], Thony Jansen MD    Office Visit    3 months ago Sensorineural hearing loss, bilateral    [facility] Audiology Sindhu Tenorio    Office Visit    3 months ago Tinnitus of both ears    [facility], [address], Thony Jansen MD    Office Visit    3 months ago Nonintractable episodic headache, unspecified headache type    Cornelio Beckford MD    Office Visit    4 months ago Right knee pain, unspecified chronicity    Lucia Pope MD    Office Visit

## 2022-10-21 ENCOUNTER — OFFICE VISIT (OUTPATIENT)
Dept: FAMILY MEDICINE CLINIC | Facility: CLINIC | Age: 77
End: 2022-10-21
Payer: MEDICARE

## 2022-10-21 VITALS
HEIGHT: 62 IN | WEIGHT: 177 LBS | TEMPERATURE: 97 F | SYSTOLIC BLOOD PRESSURE: 127 MMHG | HEART RATE: 58 BPM | BODY MASS INDEX: 32.57 KG/M2 | DIASTOLIC BLOOD PRESSURE: 68 MMHG

## 2022-10-21 DIAGNOSIS — M79.673 PAIN OF FOOT, UNSPECIFIED LATERALITY: Primary | ICD-10-CM

## 2022-10-21 DIAGNOSIS — R26.89 BALANCE DISORDER: ICD-10-CM

## 2022-10-21 PROCEDURE — 99213 OFFICE O/P EST LOW 20 MIN: CPT | Performed by: FAMILY MEDICINE

## 2022-10-21 PROCEDURE — 3008F BODY MASS INDEX DOCD: CPT | Performed by: FAMILY MEDICINE

## 2022-10-21 PROCEDURE — 3078F DIAST BP <80 MM HG: CPT | Performed by: FAMILY MEDICINE

## 2022-10-21 PROCEDURE — 1125F AMNT PAIN NOTED PAIN PRSNT: CPT | Performed by: FAMILY MEDICINE

## 2022-10-21 PROCEDURE — 3074F SYST BP LT 130 MM HG: CPT | Performed by: FAMILY MEDICINE

## 2022-10-24 ENCOUNTER — HOSPITAL ENCOUNTER (OUTPATIENT)
Dept: GENERAL RADIOLOGY | Facility: HOSPITAL | Age: 77
Discharge: HOME | End: 2022-10-24
Attending: FAMILY MEDICINE
Payer: MEDICARE

## 2022-10-24 ENCOUNTER — ORDER TRANSCRIPTION (OUTPATIENT)
Dept: ADMINISTRATIVE | Facility: HOSPITAL | Age: 77
End: 2022-10-24

## 2022-10-24 DIAGNOSIS — M25.50 ARTHRALGIA, UNSPECIFIED JOINT: ICD-10-CM

## 2022-10-24 DIAGNOSIS — Z01.818 PRE-OP TESTING: Primary | ICD-10-CM

## 2022-10-24 PROCEDURE — 73000 X-RAY EXAM OF COLLAR BONE: CPT | Performed by: FAMILY MEDICINE

## 2022-10-26 ENCOUNTER — LAB ENCOUNTER (OUTPATIENT)
Dept: LAB | Age: 77
End: 2022-10-26
Attending: OTOLARYNGOLOGY
Payer: MEDICARE

## 2022-10-26 DIAGNOSIS — Z01.818 PRE-OP TESTING: ICD-10-CM

## 2022-10-27 LAB — SARS-COV-2 RNA RESP QL NAA+PROBE: NOT DETECTED

## 2022-10-29 ENCOUNTER — HOSPITAL ENCOUNTER (OUTPATIENT)
Dept: GENERAL RADIOLOGY | Facility: HOSPITAL | Age: 77
Discharge: HOME | End: 2022-10-29
Attending: OTOLARYNGOLOGY
Payer: MEDICARE

## 2022-10-29 DIAGNOSIS — R13.10 DYSPHAGIA, UNSPECIFIED TYPE: ICD-10-CM

## 2022-10-29 PROCEDURE — 74246 X-RAY XM UPR GI TRC 2CNTRST: CPT | Performed by: OTOLARYNGOLOGY

## 2022-11-07 ENCOUNTER — TELEPHONE (OUTPATIENT)
Dept: PHYSICAL THERAPY | Facility: HOSPITAL | Age: 77
End: 2022-11-07

## 2022-11-08 ENCOUNTER — TELEPHONE (OUTPATIENT)
Dept: PHYSICAL THERAPY | Facility: HOSPITAL | Age: 77
End: 2022-11-08

## 2022-11-08 ENCOUNTER — TELEPHONE (OUTPATIENT)
Dept: SPEECH THERAPY | Facility: HOSPITAL | Age: 77
End: 2022-11-08

## 2022-11-08 ENCOUNTER — OFFICE VISIT (OUTPATIENT)
Dept: PHYSICAL THERAPY | Facility: HOSPITAL | Age: 77
End: 2022-11-08
Attending: FAMILY MEDICINE
Payer: MEDICARE

## 2022-11-08 DIAGNOSIS — R26.89 BALANCE DISORDER: ICD-10-CM

## 2022-11-08 PROCEDURE — 97162 PT EVAL MOD COMPLEX 30 MIN: CPT

## 2022-11-08 PROCEDURE — 97110 THERAPEUTIC EXERCISES: CPT

## 2022-11-09 ENCOUNTER — TELEPHONE (OUTPATIENT)
Dept: PHYSICAL THERAPY | Facility: HOSPITAL | Age: 77
End: 2022-11-09

## 2022-11-09 NOTE — TELEPHONE ENCOUNTER
Per pt request, called patient's son to offer 12:15 appointment on 11/14 instead of 7:45. Stated it was first come first serve so to call back as soon as possible if they would like to switch. Gave call center # for call back.

## 2022-11-14 ENCOUNTER — OFFICE VISIT (OUTPATIENT)
Dept: PHYSICAL THERAPY | Facility: HOSPITAL | Age: 77
End: 2022-11-14
Attending: FAMILY MEDICINE
Payer: MEDICARE

## 2022-11-14 PROCEDURE — 97112 NEUROMUSCULAR REEDUCATION: CPT

## 2022-11-14 PROCEDURE — 97110 THERAPEUTIC EXERCISES: CPT

## 2022-11-14 NOTE — PROGRESS NOTES
Diagnosis:   Balance disorder (R26.89)         Referring Provider: Yenny Lemus  Date of Evaluation:    11/8/2022    Precautions:  Fall Risk Next MD visit:   none scheduled  Date of Surgery: n/a   Insurance Primary/Secondary: [employer]MA[employer] [facility] / N/A     # Auth Visits: 8 authorized, 6 per POC       Subjective: Getting some cramps at both LE, thinks she needs to drink more water. Patient reports she gets some back and rib pain that comes and goes but doesn't have it at present. : [id number] Gonzales   Pain: 4/10    Objective: (from eval)  5x sit<>stand: 17.3                                   Fall Risk: yes   FGA:  Total Score: 21/30         Assessment: Therapy session focused on patient education regarding the importance of physical activity and lower extremity strengthening in improving balance. With cali stepping pt demonstrates increased ease with RLE as compared to LLE. No adverse effects of session, will continue to progress balance and LE strengthening. Goals: (to be met in 6 visits)  Patient will improve overall LE strength to 4+/5 bilaterally to improve ease with stair negotiation throughout her day. Patient will improve FGA scoring to 24/30 to demonstrate decreased risk of falls with community ambulation. Patient will improve 5xSTS scoring to 12s in order to demonstrate improved safety when getting up from a low chair at home. Patient will be independent with HEP in order to maintain functional gains made in therapy session. Plan: cone weaving, wobble board, sit to stand, step up  Date: 11/14/2022  TX#: 2/6 Date:                 TX#: 3/ Date:                 TX#: 4/ Date:                 TX#: 5/ Date:    Tx#: 6/   Therapeutic Exercise 22'   Nustep, 5' LE/UE   Heel raise x20   Toe raise x20 (SBA for balance)   Calf stretch on wedge 4x20s   Education, discussion of neuropathy and importance of drinking water on overall health and wellness       Neuromuscular Re-Education 20'  Tandem stance 4x20s ea  Forward cali step, 1 foot ea, SBA 5x6   Lateral cali step, SBA, cueing for forward foot position 5x6   Horiz/vert head turns on airex, x10 ea, Supervision                      HEP: tandem stance, heel/toe raise, walking program  Charges: 2 TE, 1 Neuro       Total Timed Treatment: 42 min  Total Treatment Time: 42 min

## 2022-11-15 ENCOUNTER — LAB ENCOUNTER (OUTPATIENT)
Dept: LAB | Facility: HOSPITAL | Age: 77
End: 2022-11-15
Attending: OTOLARYNGOLOGY
Payer: MEDICARE

## 2022-11-15 DIAGNOSIS — Z01.818 PRE-PROCEDURAL EXAMINATION: ICD-10-CM

## 2022-11-15 LAB — SARS-COV-2 RNA RESP QL NAA+PROBE: NOT DETECTED

## 2022-11-16 ENCOUNTER — APPOINTMENT (OUTPATIENT)
Dept: PHYSICAL THERAPY | Facility: HOSPITAL | Age: 77
End: 2022-11-16
Attending: FAMILY MEDICINE
Payer: MEDICARE

## 2022-11-16 ENCOUNTER — TELEPHONE (OUTPATIENT)
Dept: PHYSICAL THERAPY | Facility: HOSPITAL | Age: 77
End: 2022-11-16

## 2022-11-18 ENCOUNTER — HOSPITAL ENCOUNTER (OUTPATIENT)
Dept: GENERAL RADIOLOGY | Facility: HOSPITAL | Age: 77
Discharge: HOME | End: 2022-11-18
Attending: OTOLARYNGOLOGY
Payer: MEDICARE

## 2022-11-18 DIAGNOSIS — R13.10 DYSPHAGIA, UNSPECIFIED TYPE: ICD-10-CM

## 2022-11-18 PROCEDURE — 74230 X-RAY XM SWLNG FUNCJ C+: CPT | Performed by: OTOLARYNGOLOGY

## 2022-11-18 PROCEDURE — 92611 MOTION FLUOROSCOPY/SWALLOW: CPT

## 2022-11-18 NOTE — PATIENT INSTRUCTIONS
Diet Recommendations:  Solids: Regular  Liquids: Thin    Recommended compensatory strategies:   Sit upright during meals and for one hour afterward  Eat slowly  Alternate liquids and solids    Medication Administration:  No restrictions    Further Follow-up:  No dysphagia therapy recommended. Follow up with Dr. Rachelle Rose.

## 2022-11-21 ENCOUNTER — TELEPHONE (OUTPATIENT)
Dept: PHYSICAL THERAPY | Facility: HOSPITAL | Age: 77
End: 2022-11-21

## 2022-11-21 ENCOUNTER — APPOINTMENT (OUTPATIENT)
Dept: PHYSICAL THERAPY | Facility: HOSPITAL | Age: 77
End: 2022-11-21
Attending: FAMILY MEDICINE
Payer: MEDICARE

## 2022-11-23 ENCOUNTER — APPOINTMENT (OUTPATIENT)
Dept: PHYSICAL THERAPY | Facility: HOSPITAL | Age: 77
End: 2022-11-23
Attending: FAMILY MEDICINE
Payer: MEDICARE

## 2022-11-23 PROCEDURE — 97110 THERAPEUTIC EXERCISES: CPT

## 2022-11-23 PROCEDURE — 97112 NEUROMUSCULAR REEDUCATION: CPT

## 2022-11-23 NOTE — PROGRESS NOTES
Diagnosis:   Balance disorder (R26.89)         Referring Provider: Didi Little  Date of Evaluation:    11/8/2022    Precautions:  Fall Risk Next MD visit:   none scheduled  Date of Surgery: n/a   Insurance Primary/Secondary: [employer]MA[employer] [facility] / N/A     # Auth Visits: 8 authorized, 6 per POC       Subjective: Getting pain in both hips and left knee, describes it as an \"ache\". Feels like her balance is still off a little but maybe a little better. : [id number]   Pain: 4/10    Objective: (from eval)  5x sit<>stand: 17.3                                   Fall Risk: yes   FGA:  Total Score: 21/30         Assessment: Education provided in session on importance of continued HEP compliance to build LE strength for improved balance. Balance deficits seem related to BLE weakness so therapy focused on strengthening. No adverse effects of session, pt is progressing well. Goals: (to be met in 6 visits)  Patient will improve overall LE strength to 4+/5 bilaterally to improve ease with stair negotiation throughout her day. Patient will improve FGA scoring to 24/30 to demonstrate decreased risk of falls with community ambulation. Patient will improve 5xSTS scoring to 12s in order to demonstrate improved safety when getting up from a low chair at home. Patient will be independent with HEP in order to maintain functional gains made in therapy session. Plan: cone weaving, sit to stand, step up, bridging  Date: 11/14/2022  TX#: 2/6 Date:  11/23/2022             TX#: 3/6 Date:                 TX#: 4/ Date:                 TX#: 5/ Date:    Tx#: 6/   Therapeutic Exercise 22'   Nustep, 5' LE/UE   Heel raise x20   Toe raise x20 (SBA for balance)   Calf stretch on wedge 4x20s   Education, discussion of neuropathy and importance of drinking water on overall health and wellness Therapeutic Exercise 20'   Heel raise x20   Toe raise x20 (SBA for balance)   Calf stretch on wedge 4x20s   Mini squats at ballet bar 2x8  Nustep, 5' LE/UE  HEP update      Neuromuscular Re-Education 20'  Tandem stance 4x20s ea  Forward cali step, 1 foot ea, SBA 5x6   Lateral cali step, SBA, cueing for forward foot position 5x6   Horiz/vert head turns on airex, x10 ea, Supervision  Neuromuscular Re-Education 25'  Step up to double airex 2x10, SBA  Modified tandem balance on airex 10x10s R/L forward   Wobbleboard Taps AP/ML 2' ea, SBA (increased difficulty with AP)   Forward cali step, 1 foot ea, SBA 5x6   Lateral cali step, SBA, cueing for forward foot position 5x6                     HEP: tandem stance, heel/toe raise, walking program, squats at counter  Charges: 1 TE, 2Neuro       Total Timed Treatment: 45min  Total Treatment Time: 45 min

## 2022-11-28 ENCOUNTER — APPOINTMENT (OUTPATIENT)
Dept: PHYSICAL THERAPY | Facility: HOSPITAL | Age: 77
End: 2022-11-28
Attending: FAMILY MEDICINE
Payer: MEDICARE

## 2022-11-28 PROCEDURE — 97112 NEUROMUSCULAR REEDUCATION: CPT

## 2022-11-28 PROCEDURE — 97110 THERAPEUTIC EXERCISES: CPT

## 2022-11-28 NOTE — PROGRESS NOTES
Diagnosis:   Balance disorder (R26.89)         Referring Provider: Yves Maldonado  Date of Evaluation:    11/8/2022    Precautions:  Fall Risk Next MD visit:   none scheduled  Date of Surgery: n/a   Insurance Primary/Secondary: [employer] [facility] / N/A     # Auth Visits: 8 authorized, 6 per POC       Subjective: Getting some pain in low back and into the hip but feels better when she takes the pain medication in the morning. InterpreterFaustglendy Jessica, [id number]  Pain: 2/10 L knee     Objective: (from eval)  5x sit<>stand: 17.3                                   Fall Risk: yes   FGA:  Total Score: 21/30         Assessment: Balance deficits most likely related mostly to lower extremity weakness, education provided in session on importance of remaining active and adding strength training into her daily life; pt verbalized understanding. Progressing well at this time, improved stability noted with horizontal and vertical head turns in session. Goals: (to be met in 6 visits)  Patient will improve overall LE strength to 4+/5 bilaterally to improve ease with stair negotiation throughout her day. Patient will improve FGA scoring to 24/30 to demonstrate decreased risk of falls with community ambulation. Patient will improve 5xSTS scoring to 12s in order to demonstrate improved safety when getting up from a low chair at home. Patient will be independent with HEP in order to maintain functional gains made in therapy session. Plan: Side stepping, backwards walking   Date: 11/14/2022  TX#: 2/6 Date:  11/23/2022             TX#: 3/6 Date:    11/28/2022           TX#: 4/6 Date:                 TX#: 5/ Date:    Tx#: 6/   Therapeutic Exercise 22'   Nustep, 5' LE/UE   Heel raise x20   Toe raise x20 (SBA for balance)   Calf stretch on wedge 4x20s   Education, discussion of neuropathy and importance of drinking water on overall health and wellness Therapeutic Exercise 20'   Heel raise x20   Toe raise x20 (SBA for balance)   Calf stretch on wedge 4x20s   Mini squats at ballet bar 2x8  Nustep, 5' LE/UE  HEP update Therapeutic Exercise 30'    Bridging 2x12   Supine BKFO RTB 2x10  Calf stretch on wedge 4x20s   Mini squats at ballet bar 2x8  Nustep, 5' LE/UE   Education on importance of daily activity     Neuromuscular Re-Education 20'  Tandem stance 4x20s ea  Forward cali step, 1 foot ea, SBA 5x6   Lateral cali step, SBA, cueing for forward foot position 5x6   Horiz/vert head turns on airex, x10 ea, Supervision  Neuromuscular Re-Education 25'  Step up to double airex 2x10, SBA  Modified tandem balance on airex 10x10s R/L forward   Wobbleboard Taps AP/ML 2' ea, SBA (increased difficulty with AP)   Forward cali step, 1 foot ea, SBA 5x6   Lateral cali step, SBA, cueing for forward foot position 5x6  Neuromuscular Re-Education 20'    Wobbleboard Taps AP/ML 2' ea, SBA (increased difficulty with AP)   Lateral step up/over airex x10 ea   Romberg stance on airex- horiz head turn x20   Romberg Stance on airex- vert head turns x20                     HEP: tandem stance, heel/toe raise, walking program, squats at counter  Charges: 2 TE, 1Neuro       Total Timed Treatment: 50 min  Total Treatment Time: 50 min

## 2022-11-29 ENCOUNTER — OFFICE VISIT (OUTPATIENT)
Dept: OBGYN CLINIC | Facility: CLINIC | Age: 77
End: 2022-11-29
Payer: MEDICARE

## 2022-11-29 VITALS — BODY MASS INDEX: 32 KG/M2 | SYSTOLIC BLOOD PRESSURE: 111 MMHG | WEIGHT: 174.38 LBS | DIASTOLIC BLOOD PRESSURE: 70 MMHG

## 2022-11-29 DIAGNOSIS — N76.2 ACUTE VULVITIS: Primary | ICD-10-CM

## 2022-11-29 PROCEDURE — 3074F SYST BP LT 130 MM HG: CPT | Performed by: OBSTETRICS & GYNECOLOGY

## 2022-11-29 PROCEDURE — 99213 OFFICE O/P EST LOW 20 MIN: CPT | Performed by: OBSTETRICS & GYNECOLOGY

## 2022-11-29 PROCEDURE — 3078F DIAST BP <80 MM HG: CPT | Performed by: OBSTETRICS & GYNECOLOGY

## 2022-11-30 ENCOUNTER — APPOINTMENT (OUTPATIENT)
Dept: PHYSICAL THERAPY | Facility: HOSPITAL | Age: 77
End: 2022-11-30
Attending: FAMILY MEDICINE
Payer: MEDICARE

## 2022-12-02 ENCOUNTER — OFFICE VISIT (OUTPATIENT)
Dept: PHYSICAL THERAPY | Facility: HOSPITAL | Age: 77
End: 2022-12-02
Attending: FAMILY MEDICINE
Payer: MEDICARE

## 2022-12-02 PROCEDURE — 97110 THERAPEUTIC EXERCISES: CPT

## 2022-12-02 PROCEDURE — 97112 NEUROMUSCULAR REEDUCATION: CPT

## 2022-12-02 NOTE — PROGRESS NOTES
Diagnosis:   Balance disorder (R26.89)         Referring Provider: Baltazar Mars  Date of Evaluation:    11/8/2022    Precautions:  Fall Risk Next MD visit:   none scheduled  Date of Surgery: n/a   Insurance Primary/Secondary: [employer] [facility] / N/A     # Auth Visits: 8 authorized, 6 per POC       Subjective: Has a little bit of pain in both knees, was tired after last session and had a little bit of hip pain. Feels like therapy has helped a little so far. :  [id number]  Pain: 4/10 B knee     Objective: (from eval)  5x sit<>stand: 17.3                                   Fall Risk: yes   FGA:  Total Score: 21/30         Assessment: Patient shows good improvement with lateral and retro walking with repetition, balance is progressing well. Patient at this time is most limited by pain in hip and knee joints, education provided on benefit of short distance, frequent daily walks on joint health. Pt verbalized understanding. Goals: (to be met in 6 visits)  Patient will improve overall LE strength to 4+/5 bilaterally to improve ease with stair negotiation throughout her day. Patient will improve FGA scoring to 24/30 to demonstrate decreased risk of falls with community ambulation. Patient will improve 5xSTS scoring to 12s in order to demonstrate improved safety when getting up from a low chair at home. Patient will be independent with HEP in order to maintain functional gains made in therapy session. Plan: Side stepping, backwards walking   Date: 11/14/2022  TX#: 2/6 Date:  11/23/2022             TX#: 3/6 Date:    11/28/2022           TX#: 4/6 Date:  12/2/2022           TX#: 5/6 Date:    Tx#: 6/   Therapeutic Exercise 22'   Nustep, 5' LE/UE   Heel raise x20   Toe raise x20 (SBA for balance)   Calf stretch on wedge 4x20s   Education, discussion of neuropathy and importance of drinking water on overall health and wellness Therapeutic Exercise 20'   Heel raise x20   Toe raise x20 (SBA for balance)   Calf stretch on wedge 4x20s   Mini squats at ballet bar 2x8  Nustep, 5' LE/UE  HEP update Therapeutic Exercise 30'    Bridging 2x12   Supine BKFO RTB 2x10  Calf stretch on wedge 4x20s   Mini squats at ballet bar 2x8  Nustep, 5' LE/UE   Education on importance of daily activity Therapeutic Exercise 25'   Bridging 2x12   Supine BKFO RTB 2x10  Nustep(w/ discussion of daily walking), L4 UE/LE 10'   Sit to stand, standard chair x15   HEP update    Neuromuscular Re-Education 20'  Tandem stance 4x20s ea  Forward cali step, 1 foot ea, SBA 5x6   Lateral cali step, SBA, cueing for forward foot position 5x6   Horiz/vert head turns on airex, x10 ea, Supervision  Neuromuscular Re-Education 25'  Step up to double airex 2x10, SBA  Modified tandem balance on airex 10x10s R/L forward   Wobbleboard Taps AP/ML 2' ea, SBA (increased difficulty with AP)   Forward cali step, 1 foot ea, SBA 5x6   Lateral cali step, SBA, cueing for forward foot position 5x6  Neuromuscular Re-Education 20'    Wobbleboard Taps AP/ML 2' ea, SBA (increased difficulty with AP)   Lateral step up/over airex x10 ea   Romberg stance on airex- horiz head turn x20   Romberg Stance on airex- vert head turns x20   Neuromuscular Re-Education 30'    Side stepping, supervision R/L 4x30ft    Backwards walking 4x30ft, Supervision  Romberg stance on airex- horiz head turn x20   Half tandem on airex with 2# cane raise overhead R/L 2x10                       HEP: tandem stance, heel/toe raise, walking program, squats at counter, Lateral walking  Charges: 2 TE, 2Neuro       Total Timed Treatment: 55 min  Total Treatment Time: 55 min

## 2022-12-06 ENCOUNTER — APPOINTMENT (OUTPATIENT)
Dept: PHYSICAL THERAPY | Facility: HOSPITAL | Age: 77
End: 2022-12-06
Attending: FAMILY MEDICINE
Payer: MEDICARE

## 2022-12-07 ENCOUNTER — OFFICE VISIT (OUTPATIENT)
Dept: PHYSICAL THERAPY | Facility: HOSPITAL | Age: 77
End: 2022-12-07
Attending: FAMILY MEDICINE
Payer: MEDICARE

## 2022-12-07 PROCEDURE — 97112 NEUROMUSCULAR REEDUCATION: CPT

## 2022-12-07 PROCEDURE — 97110 THERAPEUTIC EXERCISES: CPT

## 2022-12-07 NOTE — PROGRESS NOTES
Progress Summary  Pt has attended 6 visits in Physical Therapy. Diagnosis:   Balance disorder (R26.89)         Referring Provider: Elio Gilbert  Date of Evaluation:    11/8/2022    Precautions:  Fall Risk Next MD visit:   none scheduled  Date of Surgery: n/a   Insurance Primary/Secondary: [employer] / N/A     # Auth Visits: 8 authorized, 6 per POC       Subjective:Pain in both knees today. Feels like therapy is helping even though she's tired after sessions. :  [id number]  Pain: 4/10 B knee     Objective:  5x sit<>stand (12/7/2022): 14s  (at eval 17.3s)                                   Fall Risk: yes   FGA (12/7/2022):   1. Gait Level Surface _3_, Time __5.1s__  2. Change in Gait Speed _3__  3. Gait with Horizontal Head Turns __3__  4. Gait with Vertical Head Turns __3__  5. Gait and Pivot Turn __3__  6. Step over Obstacle __2__, decreased speed  7. Gait with Narrow Base of Support __1__, 3steps  8. Gait with Eyes Closed __2__ Time: _12s__  9. Ambulating Backwards __3__  10. Steps __2__, reciprocal with railing use    Total Score: 25/30 (21/30 at eval)     Assessment: Patient has attended 6 visits in physical therapy to address balance deficits; she shows great improvement in FGA scoring, and based on scoring she is at a decreased risk of falls as compared to evaluation but is still most limited with narrow walking and cali stepping. Pt presents more off balance by end of session when fatigued, discussed with patient the importance of continued exercise for muscle endurance and increased safety. Goals: (to be met in 6 visits)  Patient will improve overall LE strength to 4+/5 bilaterally to improve ease with stair negotiation throughout her day. Patient will improve FGA scoring to 24/30 to demonstrate decreased risk of falls with community ambulation. Met  Patient will improve 5xSTS scoring to 12s in order to demonstrate improved safety when getting up from a low chair at home.  Progressing  Patient will be independent with HEP in order to maintain functional gains made in therapy session. Plan: Continue skilled Physical Therapy 1 x/week or a total of 2 more visits over a 90 day period. Treatment will include: gait training, therapeutic exercise, neuromuscular control, and HEP instruction. Patient/Family/Caregiver was advised of these findings, precautions, and treatment options and has agreed to actively participate in planning and for this course of care. Thank you for your referral. If you have any questions, please contact me at Dept: [phone]. Sincerely,  Electronically signed by therapist: Joanne Robert PT     Physician's certification required:  Yes  Please co-sign or sign and return this letter via fax as soon as possible to [phone]. I certify the need for these services furnished under this plan of treatment and while under my care.     X___________________________________________________ Date____________________    Certification From: 00/8/2111  To:3/7/2023     Plan: Step ups, cali stepping, HEP update  Date: 11/14/2022  TX#: 2/6 Date:  11/23/2022             TX#: 3/6 Date:    11/28/2022           TX#: 4/6 Date:  12/2/2022           TX#: 5/6 Date: 12/7/2022  Tx#: 6/6   Therapeutic Exercise 22'   Nustep, 5' LE/UE   Heel raise x20   Toe raise x20 (SBA for balance)   Calf stretch on wedge 4x20s   Education, discussion of neuropathy and importance of drinking water on overall health and wellness Therapeutic Exercise 20'   Heel raise x20   Toe raise x20 (SBA for balance)   Calf stretch on wedge 4x20s   Mini squats at ballet bar 2x8  Nustep, 5' LE/UE  HEP update Therapeutic Exercise 30'    Bridging 2x12   Supine BKFO RTB 2x10  Calf stretch on wedge 4x20s   Mini squats at ballet bar 2x8  Nustep, 5' LE/UE   Education on importance of daily activity Therapeutic Exercise 25'   Bridging 2x12   Supine BKFO RTB 2x10  Nustep(w/ discussion of daily walking), L4 UE/LE 10'   Sit to stand, standard chair x15   HEP update Therapeutic Exercise 12'  Nustep(w/ discussion of testing results and community ambulation, POC), L4 UE/LE 10'   5xSTS testing   Neuromuscular Re-Education 20'  Tandem stance 4x20s ea  Forward cali step, 1 foot ea, SBA 5x6   Lateral cali step, SBA, cueing for forward foot position 5x6   Horiz/vert head turns on airex, x10 ea, Supervision  Neuromuscular Re-Education 25'  Step up to double airex 2x10, SBA  Modified tandem balance on airex 10x10s R/L forward   Wobbleboard Taps AP/ML 2' ea, SBA (increased difficulty with AP)   Forward cali step, 1 foot ea, SBA 5x6   Lateral cali step, SBA, cueing for forward foot position 5x6  Neuromuscular Re-Education 20'    Wobbleboard Taps AP/ML 2' ea, SBA (increased difficulty with AP)   Lateral step up/over airex x10 ea   Romberg stance on airex- horiz head turn x20   Romberg Stance on airex- vert head turns x20   Neuromuscular Re-Education 30'    Side stepping, supervision R/L 4x30ft    Backwards walking 4x30ft, Supervision  Romberg stance on airex- horiz head turn x20   Half tandem on airex with 2# cane raise overhead R/L 2x10      Neuromuscular Re-Education 40'  Side stepping, supervision R/L 4x30ft   Backwards walking 4x30ft, Supervision  FGA Testing  Forward spaced out cali stepping- cueing for proximity to cali for safety, SBA 3xx10                 HEP: tandem stance, heel/toe raise, walking program, squats at counter, Lateral walking  Charges: 1 TE, 3Neuro       Total Timed Treatment: 52 min  Total Treatment Time: 52 min

## 2022-12-08 ENCOUNTER — APPOINTMENT (OUTPATIENT)
Dept: PHYSICAL THERAPY | Facility: HOSPITAL | Age: 77
End: 2022-12-08
Attending: FAMILY MEDICINE
Payer: MEDICARE

## 2022-12-09 ENCOUNTER — APPOINTMENT (OUTPATIENT)
Dept: PHYSICAL THERAPY | Facility: HOSPITAL | Age: 77
End: 2022-12-09
Attending: FAMILY MEDICINE
Payer: MEDICARE

## 2022-12-13 ENCOUNTER — APPOINTMENT (OUTPATIENT)
Dept: PHYSICAL THERAPY | Facility: HOSPITAL | Age: 77
End: 2022-12-13
Attending: FAMILY MEDICINE
Payer: MEDICARE

## 2022-12-14 ENCOUNTER — OFFICE VISIT (OUTPATIENT)
Dept: PHYSICAL THERAPY | Facility: HOSPITAL | Age: 77
End: 2022-12-14
Attending: FAMILY MEDICINE
Payer: MEDICARE

## 2022-12-14 PROCEDURE — 97112 NEUROMUSCULAR REEDUCATION: CPT

## 2022-12-14 PROCEDURE — 97110 THERAPEUTIC EXERCISES: CPT

## 2022-12-14 NOTE — PROGRESS NOTES
Diagnosis:   Balance disorder (R26.89)         Referring Provider: Narendra Chauhan  Date of Evaluation:    11/8/2022    Precautions:  Fall Risk Next MD visit:   none scheduled  Date of Surgery: n/a   Insurance Primary/Secondary: [employer] [facility] / N/A     # Auth Visits: 8 authorized, 6 per POC       Subjective: Feeling good today, does not want  today. Has been walking in the house but not outside. Pain: 4/10 B knee     Objective:  5x sit<>stand (12/7/2022): 14s  (at eval 17.3s)                                   Fall Risk: yes   FGA (12/7/2022): Total Score: 25/30 (21/30 at eval)     Assessment: Education provided in session today on the benefit of aerobic exercise for arthritis pain; pt verbalized understanding. Improved stability noted with dynamic exercise in session, including carioca stepping and backwards walking. Pt demonstrates decreased step length with retro walking but good safety awareness. Goals: (to be met in 6 visits)  Patient will improve overall LE strength to 4+/5 bilaterally to improve ease with stair negotiation throughout her day. Patient will improve FGA scoring to 24/30 to demonstrate decreased risk of falls with community ambulation. Met  Patient will improve 5xSTS scoring to 12s in order to demonstrate improved safety when getting up from a low chair at home. Progressing  Patient will be independent with HEP in order to maintain functional gains made in therapy session. Plan: Step ups, cali stepping, HEP update. Plan to D/C with HEP review next session.   Date: 11/14/2022  TX#: 2/6 Date:  11/23/2022             TX#: 3/6 Date:    11/28/2022           TX#: 4/6 Date:  12/2/2022           TX#: 5/6 Date: 12/7/2022  Tx#: 6/6 Date: 12/14/2022   Tx: 7/8   Therapeutic Exercise 22'   Nustep, 5' LE/UE   Heel raise x20   Toe raise x20 (SBA for balance)   Calf stretch on wedge 4x20s   Education, discussion of neuropathy and importance of drinking water on overall health and wellness Therapeutic Exercise 20'   Heel raise x20   Toe raise x20 (SBA for balance)   Calf stretch on wedge 4x20s   Mini squats at ballet bar 2x8  Nustep, 5' LE/UE  HEP update Therapeutic Exercise 30'    Bridging 2x12   Supine BKFO RTB 2x10  Calf stretch on wedge 4x20s   Mini squats at ballet bar 2x8  Nustep, 5' LE/UE   Education on importance of daily activity Therapeutic Exercise 25'   Bridging 2x12   Supine BKFO RTB 2x10  Nustep(w/ discussion of daily walking), L4 UE/LE 10'   Sit to stand, standard chair x15   HEP update Therapeutic Exercise 12'  Nustep(w/ discussion of testing results and community ambulation, POC), L4 UE/LE 10'   5xSTS testing Therapeutic Exercise 20'  Nustep(w/ sx monitoring), L4 UE/LE 10'   Standing squats at bar, cue for good form 2x10  Supine Bridging x20  Lower trunk rotations for back pain x20   Neuromuscular Re-Education 20'  Tandem stance 4x20s ea  Forward cali step, 1 foot ea, SBA 5x6   Lateral cali step, SBA, cueing for forward foot position 5x6   Horiz/vert head turns on airex, x10 ea, Supervision  Neuromuscular Re-Education 25'  Step up to double airex 2x10, SBA  Modified tandem balance on airex 10x10s R/L forward   Wobbleboard Taps AP/ML 2' ea, SBA (increased difficulty with AP)   Forward cali step, 1 foot ea, SBA 5x6   Lateral cali step, SBA, cueing for forward foot position 5x6  Neuromuscular Re-Education 20'    Wobbleboard Taps AP/ML 2' ea, SBA (increased difficulty with AP)   Lateral step up/over airex x10 ea   Romberg stance on airex- horiz head turn x20   Romberg Stance on airex- vert head turns x20   Neuromuscular Re-Education 30'    Side stepping, supervision R/L 4x30ft    Backwards walking 4x30ft, Supervision  Romberg stance on airex- horiz head turn x20   Half tandem on airex with 2# cane raise overhead R/L 2x10      Neuromuscular Re-Education 40'  Side stepping, supervision R/L 4x30ft   Backwards walking 4x30ft, Supervision  FGA Testing  Forward spaced out cali stepping- cueing for proximity to cali for safety, SBA 3x10 Neuromuscular Re-Education 25'  Forward spaced out cali stepping- cueing for proximity to cali for safety, SBA 3x10  Side stepping, independent R/L 4x30ft   Backwards walking 4x30ft, Supervision  Forward carioca stepping, no UE use, 4x30ft, supervision                   HEP: tandem stance, heel/toe raise, walking program, squats at counter, Lateral walking, bridging, LTR  Charges: 1 TE, 2Neuro       Total Timed Treatment: 45 min  Total Treatment Time: 45 min

## 2022-12-15 ENCOUNTER — APPOINTMENT (OUTPATIENT)
Dept: PHYSICAL THERAPY | Facility: HOSPITAL | Age: 77
End: 2022-12-15
Attending: FAMILY MEDICINE
Payer: MEDICARE

## 2022-12-16 ENCOUNTER — NURSE TRIAGE (OUTPATIENT)
Dept: FAMILY MEDICINE CLINIC | Facility: CLINIC | Age: 77
End: 2022-12-16

## 2022-12-16 ENCOUNTER — APPOINTMENT (OUTPATIENT)
Dept: PHYSICAL THERAPY | Facility: HOSPITAL | Age: 77
End: 2022-12-16
Attending: FAMILY MEDICINE
Payer: MEDICARE

## 2022-12-17 NOTE — TELEPHONE ENCOUNTER
Using language line : Arianne Part number [id number]      Left detailed message notifying patient to schedule visit.

## 2022-12-20 ENCOUNTER — OFFICE VISIT (OUTPATIENT)
Dept: FAMILY MEDICINE CLINIC | Facility: CLINIC | Age: 77
End: 2022-12-20
Payer: MEDICARE

## 2022-12-20 VITALS
SYSTOLIC BLOOD PRESSURE: 103 MMHG | WEIGHT: 176 LBS | HEIGHT: 62 IN | BODY MASS INDEX: 32.39 KG/M2 | RESPIRATION RATE: 12 BRPM | OXYGEN SATURATION: 95 % | DIASTOLIC BLOOD PRESSURE: 65 MMHG | TEMPERATURE: 97 F | HEART RATE: 65 BPM

## 2022-12-20 DIAGNOSIS — R13.10 DYSPHAGIA, UNSPECIFIED TYPE: Primary | ICD-10-CM

## 2022-12-20 DIAGNOSIS — L30.9 DERMATITIS: ICD-10-CM

## 2022-12-20 PROCEDURE — 3074F SYST BP LT 130 MM HG: CPT | Performed by: FAMILY MEDICINE

## 2022-12-20 PROCEDURE — 1126F AMNT PAIN NOTED NONE PRSNT: CPT | Performed by: FAMILY MEDICINE

## 2022-12-20 PROCEDURE — 3008F BODY MASS INDEX DOCD: CPT | Performed by: FAMILY MEDICINE

## 2022-12-20 PROCEDURE — 3078F DIAST BP <80 MM HG: CPT | Performed by: FAMILY MEDICINE

## 2022-12-20 PROCEDURE — 99213 OFFICE O/P EST LOW 20 MIN: CPT | Performed by: FAMILY MEDICINE

## 2022-12-21 ENCOUNTER — OFFICE VISIT (OUTPATIENT)
Dept: PHYSICAL THERAPY | Facility: HOSPITAL | Age: 77
End: 2022-12-21
Attending: FAMILY MEDICINE
Payer: MEDICARE

## 2022-12-21 PROCEDURE — 97112 NEUROMUSCULAR REEDUCATION: CPT

## 2022-12-21 PROCEDURE — 97110 THERAPEUTIC EXERCISES: CPT

## 2022-12-21 NOTE — PROGRESS NOTES
Discharge Summary  Pt has attended 8 visits in Physical Therapy. Diagnosis:   Balance disorder (R26.89)         Referring Provider: Sonal Malagon  Date of Evaluation:    11/8/2022    Precautions:  Fall Risk Next MD visit:   none scheduled  Date of Surgery: n/a   Insurance Primary/Secondary: [employer] [facility] / N/A     # Auth Visits: 8 authorized     Subjective: Knees are feeling okay, getting only a little bit of pain in R hip today. Hasn't been walking more but does go grocery shopping. Pain: 3/10 at R hip    Objective:  LE Strength Assessment  (* denotes performed with pain)  Hip Evaluation 12/21/2022     Flexion R 4/5 L 4/5 R 4+/5 L 4+/5     Abduction (screened sitting)  R 4/5 L 4/5  R 4+/5 L 4+/5    Knee MMT (-/5)      Flexion (screened sitting)  R 4/5 L 4/5 R 5/5 L 5/5     Extension R 4/5 L 4/5  R 5/5 L 5/5    Foot / Ankle MMT (-/5)      DF R 4-/5 L 4-/5 R 4+/5 L 4+/5     PF R 4/5 L 4/5 R 4+/5 L 4+/5       5x sit<>stand (12/7/2022): 14s  (at eval 17.3s)                                   Fall Risk: yes   FGA (12/7/2022): Total Score: 25/30 (21/30 at eval)     Assessment: Patient has attended 8 visits in outpatient physical therapy to address balance deficits and gait abnormalities; since starting therapy she shows great improvement in overall LE strength and stability with dynamic balance tasks. Discussed with patient the benefit of a walking program on overall stability and arthritic pain she feels; she verbalizes understanding but states she's not sure if she can increased walking. Instructed in detailed HEP for continued improvement and suggested patient reach out in the future with any questions or concerns. Goals: (to be met in 6 visits)  Patient will improve overall LE strength to 4+/5 bilaterally to improve ease with stair negotiation throughout her day. Met  Patient will improve FGA scoring to 24/30 to demonstrate decreased risk of falls with community ambulation.  Met  Patient will improve 5xSTS scoring to 12s in order to demonstrate improved safety when getting up from a low chair at home. Limited by knee pain, continued with HEP   Patient will be independent with HEP in order to maintain functional gains made in therapy session. Met    Plan: Patient will be discharged home with detailed HEP       Patient/Family/Caregiver was advised of these findings, precautions, and treatment options and has agreed to actively participate in planning and for this course of care. Thank you for your referral. If you have any questions, please contact me at Dept: [phone]. Sincerely,  Electronically signed by therapist: Shefali Red PT     Physician's certification required:  No  Please co-sign or sign and return this letter via fax as soon as possible to [phone]. I certify the need for these services furnished under this plan of treatment and while under my care.     X___________________________________________________ Date____________________    Certification From: 18/38/2300  To:3/21/2023      Date:  11/23/2022             TX#: 3/6 Date:    11/28/2022           TX#: 4/6 Date:  12/2/2022           TX#: 5/6 Date: 12/7/2022  Tx#: 6/6 Date: 12/14/2022   Tx: 7/8 Date: 12/21/2022  Tx: 8/8   Therapeutic Exercise 20'   Heel raise x20   Toe raise x20 (SBA for balance)   Calf stretch on wedge 4x20s   Mini squats at ballet bar 2x8  Nustep, 5' LE/UE  HEP update Therapeutic Exercise 30'    Bridging 2x12   Supine BKFO RTB 2x10  Calf stretch on wedge 4x20s   Mini squats at ballet bar 2x8  Nustep, 5' LE/UE   Education on importance of daily activity Therapeutic Exercise 25'   Bridging 2x12   Supine BKFO RTB 2x10  Nustep(w/ discussion of daily walking), L4 UE/LE 10'   Sit to stand, standard chair x15   HEP update Therapeutic Exercise 12'  Nustep(w/ discussion of testing results and community ambulation, POC), L4 UE/LE 10'   5xSTS testing Therapeutic Exercise 20'  Nustep(w/ sx monitoring), L4 UE/LE 10'   Standing squats at bar, cue for good form 2x10  Supine Bridging x20  Lower trunk rotations for back pain x20 Therapeutic Exercise 30'   MMT strength testing   Standing heel/toe raise x20 ea  Standing squats at bar, cue for good form 2x10  Supine Bridging x20  Lower trunk rotations for back pain x20  Nustep(w/ sx monitoring), L4 UE/LE 10'    Neuromuscular Re-Education 25'  Step up to double airex 2x10, SBA  Modified tandem balance on airex 10x10s R/L forward   Wobbleboard Taps AP/ML 2' ea, SBA (increased difficulty with AP)   Forward cali step, 1 foot ea, SBA 5x6   Lateral cali step, SBA, cueing for forward foot position 5x6  Neuromuscular Re-Education 20'    Wobbleboard Taps AP/ML 2' ea, SBA (increased difficulty with AP)   Lateral step up/over airex x10 ea   Romberg stance on airex- horiz head turn x20   Romberg Stance on airex- vert head turns x20   Neuromuscular Re-Education 30'    Side stepping, supervision R/L 4x30ft    Backwards walking 4x30ft, Supervision  Romberg stance on airex- horiz head turn x20   Half tandem on airex with 2# cane raise overhead R/L 2x10      Neuromuscular Re-Education 40'  Side stepping, supervision R/L 4x30ft   Backwards walking 4x30ft, Supervision  FGA Testing  Forward spaced out cali stepping- cueing for proximity to cali for safety, SBA 3x10 Neuromuscular Re-Education 25'  Forward spaced out cali stepping- cueing for proximity to cali for safety, SBA 3x10  Side stepping, independent R/L 4x30ft   Backwards walking 4x30ft, Supervision  Forward carioca stepping, no UE use, 4x30ft, supervision Neuromuscular Re-Education 15 '  Forward spaced out cali stepping- supervision, 3x10  Lateral cali stepping, supervision 3x20 ft  Tandem stance at ballet bar 4x20s ea                   HEP: tandem stance, heel/toe raise, walking program, squats at counter, Lateral walking, bridging, LTR  Charges: 2 TE, 1 Neuro       Total Timed Treatment: 45 min  Total Treatment Time: 45 min

## 2022-12-23 ENCOUNTER — APPOINTMENT (OUTPATIENT)
Dept: PHYSICAL THERAPY | Facility: HOSPITAL | Age: 77
End: 2022-12-23
Attending: FAMILY MEDICINE
Payer: MEDICARE

## 2022-12-28 ENCOUNTER — APPOINTMENT (OUTPATIENT)
Dept: PHYSICAL THERAPY | Facility: HOSPITAL | Age: 77
End: 2022-12-28
Attending: FAMILY MEDICINE
Payer: MEDICARE

## 2022-12-30 ENCOUNTER — APPOINTMENT (OUTPATIENT)
Dept: PHYSICAL THERAPY | Facility: HOSPITAL | Age: 77
End: 2022-12-30
Attending: FAMILY MEDICINE
Payer: MEDICARE

## 2023-01-05 RX ORDER — NAPROXEN 500 MG/1
TABLET ORAL
Qty: 120 TABLET | Refills: 0 | Status: SHIPPED | OUTPATIENT
Start: 2023-01-05

## 2023-01-20 ENCOUNTER — OFFICE VISIT (OUTPATIENT)
Dept: OTOLARYNGOLOGY | Facility: CLINIC | Age: 78
End: 2023-01-20

## 2023-01-20 DIAGNOSIS — R13.10 DYSPHAGIA, UNSPECIFIED TYPE: Primary | ICD-10-CM

## 2023-01-20 PROCEDURE — 99213 OFFICE O/P EST LOW 20 MIN: CPT | Performed by: OTOLARYNGOLOGY

## 2023-01-20 PROCEDURE — 31575 DIAGNOSTIC LARYNGOSCOPY: CPT | Performed by: OTOLARYNGOLOGY

## 2023-01-20 RX ORDER — OMEPRAZOLE 40 MG/1
40 CAPSULE, DELAYED RELEASE ORAL DAILY
Qty: 30 CAPSULE | Refills: 2 | Status: SHIPPED | OUTPATIENT
Start: 2023-01-20

## 2023-01-20 RX ORDER — FLUTICASONE PROPIONATE 50 MCG
1 SPRAY, SUSPENSION (ML) NASAL 2 TIMES DAILY
Qty: 16 G | Refills: 3 | Status: SHIPPED | OUTPATIENT
Start: 2023-01-20

## 2023-01-20 RX ORDER — MONTELUKAST SODIUM 10 MG/1
10 TABLET ORAL NIGHTLY
Qty: 30 TABLET | Refills: 3 | Status: SHIPPED | OUTPATIENT
Start: 2023-01-20

## 2023-01-20 RX ORDER — LORATADINE 10 MG/1
10 TABLET ORAL DAILY
Qty: 30 TABLET | Refills: 3 | Status: SHIPPED | OUTPATIENT
Start: 2023-01-20

## 2023-01-31 ENCOUNTER — TELEPHONE (OUTPATIENT)
Dept: DERMATOLOGY CLINIC | Facility: CLINIC | Age: 78
End: 2023-01-31

## 2023-02-06 ENCOUNTER — NURSE TRIAGE (OUTPATIENT)
Dept: FAMILY MEDICINE CLINIC | Facility: CLINIC | Age: 78
End: 2023-02-06

## 2023-02-07 ENCOUNTER — OFFICE VISIT (OUTPATIENT)
Dept: FAMILY MEDICINE CLINIC | Facility: CLINIC | Age: 78
End: 2023-02-07

## 2023-02-07 VITALS
OXYGEN SATURATION: 98 % | RESPIRATION RATE: 16 BRPM | DIASTOLIC BLOOD PRESSURE: 66 MMHG | HEIGHT: 62 IN | SYSTOLIC BLOOD PRESSURE: 128 MMHG | WEIGHT: 176 LBS | HEART RATE: 86 BPM | BODY MASS INDEX: 32.39 KG/M2

## 2023-02-07 DIAGNOSIS — M25.559 HIP PAIN: Primary | ICD-10-CM

## 2023-02-07 PROCEDURE — 3078F DIAST BP <80 MM HG: CPT | Performed by: FAMILY MEDICINE

## 2023-02-07 PROCEDURE — 3008F BODY MASS INDEX DOCD: CPT | Performed by: FAMILY MEDICINE

## 2023-02-07 PROCEDURE — 99213 OFFICE O/P EST LOW 20 MIN: CPT | Performed by: FAMILY MEDICINE

## 2023-02-07 PROCEDURE — 1125F AMNT PAIN NOTED PAIN PRSNT: CPT | Performed by: FAMILY MEDICINE

## 2023-02-07 PROCEDURE — 3074F SYST BP LT 130 MM HG: CPT | Performed by: FAMILY MEDICINE

## 2023-02-07 RX ORDER — TRAMADOL HYDROCHLORIDE 50 MG/1
50 TABLET ORAL EVERY 6 HOURS PRN
Qty: 20 TABLET | Refills: 0 | Status: SHIPPED | OUTPATIENT
Start: 2023-02-07

## 2023-02-14 ENCOUNTER — LAB ENCOUNTER (OUTPATIENT)
Dept: LAB | Facility: HOSPITAL | Age: 78
End: 2023-02-14
Attending: FAMILY MEDICINE
Payer: MEDICARE

## 2023-02-14 ENCOUNTER — HOSPITAL ENCOUNTER (OUTPATIENT)
Dept: GENERAL RADIOLOGY | Facility: HOSPITAL | Age: 78
Discharge: HOME | End: 2023-02-14
Attending: FAMILY MEDICINE
Payer: MEDICARE

## 2023-02-14 DIAGNOSIS — M25.559 HIP PAIN: ICD-10-CM

## 2023-02-14 LAB
ALBUMIN SERPL-MCNC: 3.8 G/DL (ref 3.4–5)
ALBUMIN/GLOB SERPL: 1.2 {RATIO} (ref 1–2)
ALP LIVER SERPL-CCNC: 50 U/L
ALT SERPL-CCNC: 18 U/L
ANION GAP SERPL CALC-SCNC: 6 MMOL/L (ref 0–18)
AST SERPL-CCNC: 18 U/L (ref 15–37)
BASOPHILS # BLD AUTO: 0.03 X10(3) UL (ref 0–0.2)
BASOPHILS NFR BLD AUTO: 0.7 %
BILIRUB SERPL-MCNC: 0.6 MG/DL (ref 0.1–2)
BUN BLD-MCNC: 21 MG/DL (ref 7–18)
BUN/CREAT SERPL: 24.4 (ref 10–20)
CALCIUM BLD-MCNC: 9.6 MG/DL (ref 8.5–10.1)
CHLORIDE SERPL-SCNC: 109 MMOL/L (ref 98–112)
CO2 SERPL-SCNC: 28 MMOL/L (ref 21–32)
CREAT BLD-MCNC: 0.86 MG/DL
DEPRECATED RDW RBC AUTO: 49.7 FL (ref 35.1–46.3)
EOSINOPHIL # BLD AUTO: 0.14 X10(3) UL (ref 0–0.7)
EOSINOPHIL NFR BLD AUTO: 3.2 %
ERYTHROCYTE [DISTWIDTH] IN BLOOD BY AUTOMATED COUNT: 13.9 % (ref 11–15)
FASTING STATUS PATIENT QL REPORTED: NO
GFR SERPLBLD BASED ON 1.73 SQ M-ARVRAT: 70 ML/MIN/1.73M2 (ref 60–?)
GLOBULIN PLAS-MCNC: 3.1 G/DL (ref 2.8–4.4)
GLUCOSE BLD-MCNC: 106 MG/DL (ref 70–99)
HCT VFR BLD AUTO: 40.3 %
HGB BLD-MCNC: 13.2 G/DL
IMM GRANULOCYTES # BLD AUTO: 0.01 X10(3) UL (ref 0–1)
IMM GRANULOCYTES NFR BLD: 0.2 %
LYMPHOCYTES # BLD AUTO: 1.54 X10(3) UL (ref 1–4)
LYMPHOCYTES NFR BLD AUTO: 35.1 %
MCH RBC QN AUTO: 31.8 PG (ref 26–34)
MCHC RBC AUTO-ENTMCNC: 32.8 G/DL (ref 31–37)
MCV RBC AUTO: 97.1 FL
MONOCYTES # BLD AUTO: 0.48 X10(3) UL (ref 0.1–1)
MONOCYTES NFR BLD AUTO: 10.9 %
NEUTROPHILS # BLD AUTO: 2.19 X10 (3) UL (ref 1.5–7.7)
NEUTROPHILS # BLD AUTO: 2.19 X10(3) UL (ref 1.5–7.7)
NEUTROPHILS NFR BLD AUTO: 49.9 %
OSMOLALITY SERPL CALC.SUM OF ELEC: 299 MOSM/KG (ref 275–295)
PLATELET # BLD AUTO: 181 10(3)UL (ref 150–450)
POTASSIUM SERPL-SCNC: 4.6 MMOL/L (ref 3.5–5.1)
PROT SERPL-MCNC: 6.9 G/DL (ref 6.4–8.2)
RBC # BLD AUTO: 4.15 X10(6)UL
SODIUM SERPL-SCNC: 143 MMOL/L (ref 136–145)
WBC # BLD AUTO: 4.4 X10(3) UL (ref 4–11)

## 2023-02-14 PROCEDURE — 73522 X-RAY EXAM HIPS BI 3-4 VIEWS: CPT | Performed by: FAMILY MEDICINE

## 2023-02-14 PROCEDURE — 73523 X-RAY EXAM HIPS BI 5/> VIEWS: CPT | Performed by: FAMILY MEDICINE

## 2023-02-14 PROCEDURE — 80053 COMPREHEN METABOLIC PANEL: CPT | Performed by: FAMILY MEDICINE

## 2023-02-14 PROCEDURE — 36415 COLL VENOUS BLD VENIPUNCTURE: CPT | Performed by: FAMILY MEDICINE

## 2023-02-14 PROCEDURE — 85025 COMPLETE CBC W/AUTO DIFF WBC: CPT | Performed by: FAMILY MEDICINE

## 2023-02-24 ENCOUNTER — OFFICE VISIT (OUTPATIENT)
Dept: OTOLARYNGOLOGY | Facility: CLINIC | Age: 78
End: 2023-02-24

## 2023-02-24 ENCOUNTER — TELEPHONE (OUTPATIENT)
Dept: OTOLARYNGOLOGY | Facility: CLINIC | Age: 78
End: 2023-02-24

## 2023-02-24 DIAGNOSIS — R13.10 DYSPHAGIA, UNSPECIFIED TYPE: Primary | ICD-10-CM

## 2023-02-24 PROCEDURE — 99213 OFFICE O/P EST LOW 20 MIN: CPT | Performed by: OTOLARYNGOLOGY

## 2023-02-24 NOTE — TELEPHONE ENCOUNTER
Pt seen by Dr Nguyen Singh  Forgot to tell dr to send her meds to [facility] mail order 90 days with refills please

## 2023-03-21 ENCOUNTER — TELEPHONE (OUTPATIENT)
Dept: FAMILY MEDICINE CLINIC | Facility: CLINIC | Age: 78
End: 2023-03-21

## 2023-03-22 ENCOUNTER — TELEPHONE (OUTPATIENT)
Dept: CASE MANAGEMENT | Age: 78
End: 2023-03-22

## 2023-03-22 DIAGNOSIS — Z01.00 ENCOUNTER FOR COMPLETE EYE EXAM: ICD-10-CM

## 2023-03-22 DIAGNOSIS — H40.9 GLAUCOMA, UNSPECIFIED GLAUCOMA TYPE, UNSPECIFIED LATERALITY: Primary | ICD-10-CM

## 2023-03-22 NOTE — TELEPHONE ENCOUNTER
Dr. Narendra Chauhan,     The patient is requesting a referral to   Dr. Glo Rich for follow up visits for glaucoma. Pended referral please review diagnosis and sign off if you agree. Thank you.   Nirmal Tubbs

## 2023-03-27 ENCOUNTER — OFFICE VISIT (OUTPATIENT)
Dept: ORTHOPEDICS CLINIC | Facility: CLINIC | Age: 78
End: 2023-03-27

## 2023-03-27 VITALS — HEIGHT: 62 IN | WEIGHT: 174.19 LBS | BODY MASS INDEX: 32.05 KG/M2

## 2023-03-27 DIAGNOSIS — M16.12 PRIMARY OSTEOARTHRITIS OF LEFT HIP: Primary | ICD-10-CM

## 2023-03-27 PROCEDURE — 99203 OFFICE O/P NEW LOW 30 MIN: CPT | Performed by: ORTHOPAEDIC SURGERY

## 2023-03-27 PROCEDURE — 1125F AMNT PAIN NOTED PAIN PRSNT: CPT | Performed by: ORTHOPAEDIC SURGERY

## 2023-03-27 PROCEDURE — 3008F BODY MASS INDEX DOCD: CPT | Performed by: ORTHOPAEDIC SURGERY

## 2023-04-04 ENCOUNTER — OFFICE VISIT (OUTPATIENT)
Dept: FAMILY MEDICINE CLINIC | Facility: CLINIC | Age: 78
End: 2023-04-04

## 2023-04-04 VITALS
WEIGHT: 174 LBS | BODY MASS INDEX: 32.02 KG/M2 | SYSTOLIC BLOOD PRESSURE: 126 MMHG | RESPIRATION RATE: 18 BRPM | HEIGHT: 62 IN | DIASTOLIC BLOOD PRESSURE: 62 MMHG | OXYGEN SATURATION: 99 % | HEART RATE: 67 BPM

## 2023-04-04 DIAGNOSIS — M50.30 DEGENERATION OF INTERVERTEBRAL DISC OF CERVICAL REGION WITH OSTEOPHYTE OF CERVICAL VERTEBRA: ICD-10-CM

## 2023-04-04 DIAGNOSIS — K21.00 GASTROESOPHAGEAL REFLUX DISEASE WITH ESOPHAGITIS WITHOUT HEMORRHAGE: ICD-10-CM

## 2023-04-04 DIAGNOSIS — I10 ESSENTIAL HYPERTENSION: ICD-10-CM

## 2023-04-04 DIAGNOSIS — E66.09 CLASS 1 OBESITY DUE TO EXCESS CALORIES WITH SERIOUS COMORBIDITY AND BODY MASS INDEX (BMI) OF 32.0 TO 32.9 IN ADULT: Primary | ICD-10-CM

## 2023-04-04 DIAGNOSIS — M25.78 DEGENERATION OF INTERVERTEBRAL DISC OF CERVICAL REGION WITH OSTEOPHYTE OF CERVICAL VERTEBRA: ICD-10-CM

## 2023-04-04 DIAGNOSIS — M67.919 TENDINOPATHY OF ROTATOR CUFF, UNSPECIFIED LATERALITY: ICD-10-CM

## 2023-04-04 DIAGNOSIS — H93.13 TINNITUS OF BOTH EARS: ICD-10-CM

## 2023-04-04 DIAGNOSIS — Z00.00 ENCOUNTER FOR ANNUAL HEALTH EXAMINATION: ICD-10-CM

## 2023-04-04 DIAGNOSIS — K63.89 MELANOSIS COLI: ICD-10-CM

## 2023-04-04 DIAGNOSIS — E78.5 HYPERLIPIDEMIA, UNSPECIFIED HYPERLIPIDEMIA TYPE: ICD-10-CM

## 2023-04-04 DIAGNOSIS — H47.20 RIGHT OPTIC NERVE ATROPHY: ICD-10-CM

## 2023-04-04 DIAGNOSIS — H90.3 SENSORINEURAL HEARING LOSS (SNHL) OF BOTH EARS: ICD-10-CM

## 2023-04-04 DIAGNOSIS — H40.1133 PRIMARY OPEN ANGLE GLAUCOMA (POAG) OF BOTH EYES, SEVERE STAGE: ICD-10-CM

## 2023-04-04 PROBLEM — J41.0 SMOKERS' COUGH (HCC): Chronic | Status: ACTIVE | Noted: 2023-04-04

## 2023-04-04 PROCEDURE — 1125F AMNT PAIN NOTED PAIN PRSNT: CPT | Performed by: FAMILY MEDICINE

## 2023-04-04 PROCEDURE — 3074F SYST BP LT 130 MM HG: CPT | Performed by: FAMILY MEDICINE

## 2023-04-04 PROCEDURE — 3008F BODY MASS INDEX DOCD: CPT | Performed by: FAMILY MEDICINE

## 2023-04-04 PROCEDURE — 3078F DIAST BP <80 MM HG: CPT | Performed by: FAMILY MEDICINE

## 2023-04-04 PROCEDURE — 96160 PT-FOCUSED HLTH RISK ASSMT: CPT | Performed by: FAMILY MEDICINE

## 2023-04-04 PROCEDURE — G0439 PPPS, SUBSEQ VISIT: HCPCS | Performed by: FAMILY MEDICINE

## 2023-04-04 RX ORDER — LORATADINE 10 MG/1
10 TABLET ORAL DAILY
Qty: 90 TABLET | Refills: 0 | Status: SHIPPED | OUTPATIENT
Start: 2023-04-04

## 2023-04-04 RX ORDER — FLUTICASONE PROPIONATE 50 MCG
1 SPRAY, SUSPENSION (ML) NASAL 2 TIMES DAILY
Qty: 16 G | Refills: 3 | Status: SHIPPED | OUTPATIENT
Start: 2023-04-04

## 2023-04-04 RX ORDER — MONTELUKAST SODIUM 10 MG/1
10 TABLET ORAL NIGHTLY
Qty: 90 TABLET | Refills: 1 | Status: SHIPPED | OUTPATIENT
Start: 2023-04-04

## 2023-04-04 RX ORDER — SOLIFENACIN SUCCINATE 5 MG/1
5 TABLET, FILM COATED ORAL DAILY
Qty: 90 TABLET | Refills: 1 | Status: SHIPPED | OUTPATIENT
Start: 2023-04-04

## 2023-05-30 ENCOUNTER — TELEPHONE (OUTPATIENT)
Dept: FAMILY MEDICINE CLINIC | Facility: CLINIC | Age: 78
End: 2023-05-30

## 2023-05-30 DIAGNOSIS — I10 ESSENTIAL HYPERTENSION: ICD-10-CM

## 2023-05-30 NOTE — TELEPHONE ENCOUNTER
With SANDRA Adrian  Lex Cage ID# [id number]. Identified patient's name and . Patient states she's been having \"a lot of pain and cramping in my body, and cannot sleep because of this pain. \"  States muscle pain is something new. Has been taking Tylenol and Naproxen. Patient asking to be seen.   Scheduled with Dr. Chaya Jonas on  at 11:30 am.

## 2023-05-31 RX ORDER — LISINOPRIL 20 MG/1
20 TABLET ORAL DAILY
Qty: 90 TABLET | Refills: 3 | Status: SHIPPED | OUTPATIENT
Start: 2023-05-31

## 2023-05-31 NOTE — TELEPHONE ENCOUNTER
Refill passed per [employer], [employer] protocol    Requested Prescriptions   Pending Prescriptions Disp Refills    LISINOPRIL 20 MG Oral Tab [Pharmacy Med Name: LISINOPRIL 20 MG Tablet] 90 tablet 1     Sig: TOME 1 Aileen Apgar       Hypertensive Medications Protocol Passed - 2023  2:09 PM        Passed - In person appointment in the past 12 or next 3 months     Recent Outpatient Visits              1 month ago Class 1 obesity due to excess calories with serious comorbidity and body mass index (BMI) of 32.0 to 32.9 in adult    Cecile Bobby MD    Office Visit    2 months ago Primary osteoarthritis of left hip    [address], [address], Wilber Ansari MD    Office Visit    3 months ago Dysphagia, unspecified type    [address], [address], Endy Jansen MD    Office Visit    3 months ago Hip pain    Cecile Bobby MD    Office Visit    4 months [facility], [facility] [address], Endy Jansen MD    Office Visit          Future Appointments         Provider Department Appt Notes    Tomorrow Jem Dougherty MD [facility], [address] Generalized body pains               Passed - Last BP reading less than 140/90     BP Readings from Last 1 Encounters:  23 : 126/62                Passed - CMP or BMP in past 6 months     Recent Results (from the past 4392 hour(s))   COMP METABOLIC PANEL (14)    Collection Time: 23 12:14 PM   Result Value Ref Range    Glucose 106 (H) 70 - 99 mg/dL    Sodium 143 136 - 145 mmol/L    Potassium 4.6 3.5 - 5.1 mmol/L    Chloride 109 98 - 112 mmol/L    CO2 28.0 21.0 - 32.0 mmol/L    Anion Gap 6 0 - 18 mmol/L    BUN 21 (H) 7 - 18 mg/dL    Creatinine 0.86 0.55 - 1.02 mg/dL    BUN/CREA Ratio 24.4 (H) 10.0 - 20.0 Calcium, Total 9.6 8.5 - 10.1 mg/dL    Calculated Osmolality 299 (H) 275 - 295 mOsm/kg    eGFR-Cr 70 >=60 mL/min/1.73m2    ALT 18 13 - 56 U/L    AST 18 15 - 37 U/L    Alkaline Phosphatase 50 (L) 55 - 142 U/L    Bilirubin, Total 0.6 0.1 - 2.0 mg/dL    Total Protein 6.9 6.4 - 8.2 g/dL    Albumin 3.8 3.4 - 5.0 g/dL    Globulin  3.1 2.8 - 4.4 g/dL    A/G Ratio 1.2 1.0 - 2.0    Patient Fasting for CMP? No      *Note: Due to a large number of results and/or encounters for the requested time period, some results have not been displayed. A complete set of results can be found in Results Review.                  Passed - In person appointment or virtual visit in the past 6 months     Recent Outpatient Visits              1 month ago Class 1 obesity due to excess calories with serious comorbidity and body mass index (BMI) of 32.0 to 32.9 in adult    Alexsandra Ch MD    Office Visit    2 months ago Primary osteoarthritis of left hip    [address] [place], [place] [address]Kasandra MD    Office Visit    3 months ago Dysphagia, unspecified type    [address] [facility],[address], [address] [facility], Yanna Jansen MD    Office Visit    3 months ago Hip pain    Alexsandra Ch MD    Office Visit    4 months ago Dysphagia, unspecified type    [address] [place] [address], Yanna Jansen MD    Office Visit          Future Appointments         Provider Department Appt Notes    [facility], [address] [facility] Generalized body pains               Passed - EGFRCR or GFRNAA > 50     GFR Evaluation  EGFRCR: 79 , resulted on 2/14/2023

## 2023-06-01 ENCOUNTER — OFFICE VISIT (OUTPATIENT)
Dept: FAMILY MEDICINE CLINIC | Facility: CLINIC | Age: 78
End: 2023-06-01

## 2023-06-01 ENCOUNTER — LAB ENCOUNTER (OUTPATIENT)
Dept: LAB | Age: 78
End: 2023-06-01
Attending: FAMILY MEDICINE
Payer: MEDICARE

## 2023-06-01 VITALS
BODY MASS INDEX: 32.39 KG/M2 | SYSTOLIC BLOOD PRESSURE: 95 MMHG | WEIGHT: 176 LBS | HEIGHT: 62 IN | HEART RATE: 60 BPM | DIASTOLIC BLOOD PRESSURE: 58 MMHG | OXYGEN SATURATION: 97 %

## 2023-06-01 DIAGNOSIS — R32 URINARY INCONTINENCE, UNSPECIFIED TYPE: ICD-10-CM

## 2023-06-01 DIAGNOSIS — L98.9 SKIN LESION: Primary | ICD-10-CM

## 2023-06-01 DIAGNOSIS — M79.10 MYALGIA: ICD-10-CM

## 2023-06-01 LAB
ANION GAP SERPL CALC-SCNC: 4 MMOL/L (ref 0–18)
BUN BLD-MCNC: 22 MG/DL (ref 7–18)
BUN/CREAT SERPL: 26.8 (ref 10–20)
CALCIUM BLD-MCNC: 9.3 MG/DL (ref 8.5–10.1)
CHLORIDE SERPL-SCNC: 106 MMOL/L (ref 98–112)
CK SERPL-CCNC: 101 U/L
CO2 SERPL-SCNC: 27 MMOL/L (ref 21–32)
CREAT BLD-MCNC: 0.82 MG/DL
FASTING STATUS PATIENT QL REPORTED: NO
GFR SERPLBLD BASED ON 1.73 SQ M-ARVRAT: 74 ML/MIN/1.73M2 (ref 60–?)
GLUCOSE BLD-MCNC: 108 MG/DL (ref 70–99)
MAGNESIUM SERPL-MCNC: 2.3 MG/DL (ref 1.6–2.6)
OSMOLALITY SERPL CALC.SUM OF ELEC: 288 MOSM/KG (ref 275–295)
POTASSIUM SERPL-SCNC: 4.8 MMOL/L (ref 3.5–5.1)
SODIUM SERPL-SCNC: 137 MMOL/L (ref 136–145)

## 2023-06-01 PROCEDURE — 1126F AMNT PAIN NOTED NONE PRSNT: CPT | Performed by: FAMILY MEDICINE

## 2023-06-01 PROCEDURE — 82550 ASSAY OF CK (CPK): CPT | Performed by: FAMILY MEDICINE

## 2023-06-01 PROCEDURE — 80048 BASIC METABOLIC PNL TOTAL CA: CPT | Performed by: FAMILY MEDICINE

## 2023-06-01 PROCEDURE — 3008F BODY MASS INDEX DOCD: CPT | Performed by: FAMILY MEDICINE

## 2023-06-01 PROCEDURE — 1160F RVW MEDS BY RX/DR IN RCRD: CPT | Performed by: FAMILY MEDICINE

## 2023-06-01 PROCEDURE — 3078F DIAST BP <80 MM HG: CPT | Performed by: FAMILY MEDICINE

## 2023-06-01 PROCEDURE — 83735 ASSAY OF MAGNESIUM: CPT | Performed by: FAMILY MEDICINE

## 2023-06-01 PROCEDURE — 1159F MED LIST DOCD IN RCRD: CPT | Performed by: FAMILY MEDICINE

## 2023-06-01 PROCEDURE — 3074F SYST BP LT 130 MM HG: CPT | Performed by: FAMILY MEDICINE

## 2023-06-01 PROCEDURE — 87086 URINE CULTURE/COLONY COUNT: CPT | Performed by: FAMILY MEDICINE

## 2023-06-01 PROCEDURE — 99214 OFFICE O/P EST MOD 30 MIN: CPT | Performed by: FAMILY MEDICINE

## 2023-06-01 PROCEDURE — 36415 COLL VENOUS BLD VENIPUNCTURE: CPT | Performed by: FAMILY MEDICINE

## 2023-06-01 RX ORDER — CLOTRIMAZOLE 1 %
1 CREAM (GRAM) TOPICAL 2 TIMES DAILY
Qty: 60 G | Refills: 1 | Status: SHIPPED | OUTPATIENT
Start: 2023-06-01

## 2023-06-01 RX ORDER — TRIAMCINOLONE ACETONIDE 1 MG/G
CREAM TOPICAL 2 TIMES DAILY PRN
Qty: 60 G | Refills: 3 | Status: SHIPPED | OUTPATIENT
Start: 2023-06-01

## 2023-06-20 ENCOUNTER — OFFICE VISIT (OUTPATIENT)
Dept: DERMATOLOGY CLINIC | Facility: CLINIC | Age: 78
End: 2023-06-20

## 2023-06-20 DIAGNOSIS — L30.4 INTERTRIGO: ICD-10-CM

## 2023-06-20 DIAGNOSIS — L82.1 SEBORRHEIC KERATOSES: Primary | ICD-10-CM

## 2023-06-20 PROCEDURE — 1160F RVW MEDS BY RX/DR IN RCRD: CPT | Performed by: STUDENT IN AN ORGANIZED HEALTH CARE EDUCATION/TRAINING PROGRAM

## 2023-06-20 PROCEDURE — 99204 OFFICE O/P NEW MOD 45 MIN: CPT | Performed by: STUDENT IN AN ORGANIZED HEALTH CARE EDUCATION/TRAINING PROGRAM

## 2023-06-20 PROCEDURE — 1159F MED LIST DOCD IN RCRD: CPT | Performed by: STUDENT IN AN ORGANIZED HEALTH CARE EDUCATION/TRAINING PROGRAM

## 2023-06-21 RX ORDER — NYSTATIN AND TRIAMCINOLONE ACETONIDE 100000; 1 [USP'U]/G; MG/G
OINTMENT TOPICAL
Qty: 60 G | Refills: 1 | Status: SHIPPED | OUTPATIENT
Start: 2023-06-21

## 2023-07-27 ENCOUNTER — NURSE TRIAGE (OUTPATIENT)
Dept: FAMILY MEDICINE CLINIC | Facility: CLINIC | Age: 78
End: 2023-07-27

## 2023-07-27 NOTE — TELEPHONE ENCOUNTER
Action Requested: Summary for Provider     []  Critical Lab, Recommendations Needed  [] Need Additional Advice  []   FYI    []   Need Orders  [] Need Medications Sent to Pharmacy  []  Other     SUMMARY: With  ID# [id number]    Patient calling in with stabbing pain in her groin in both legs that makes it difficult to walk , she states she can't walk without a lot of pain . \"Feels like a knife there\"  Patient states pain is affecting knee and ankle feels burning, pain started 5 days ago right side worse than left. Patient asking to be seen in the office today. There is no available appointment. Patient advised IC for an evaluation. Patient verbalized understanding    Reason for call: Groin Pain  Onset: 5 days ago.     Reason for Disposition   SEVERE pain (e.g., excruciating, unable to do any normal activities)    Protocols used: Hip Pain-A-[place]

## 2023-07-29 ENCOUNTER — APPOINTMENT (OUTPATIENT)
Dept: GENERAL RADIOLOGY | Age: 78
End: 2023-07-29
Attending: NURSE PRACTITIONER
Payer: MEDICARE

## 2023-07-29 ENCOUNTER — HOSPITAL ENCOUNTER (OUTPATIENT)
Age: 78
Discharge: HOME | End: 2023-07-29
Payer: MEDICARE

## 2023-07-29 VITALS
HEART RATE: 57 BPM | DIASTOLIC BLOOD PRESSURE: 62 MMHG | RESPIRATION RATE: 20 BRPM | OXYGEN SATURATION: 98 % | SYSTOLIC BLOOD PRESSURE: 118 MMHG | TEMPERATURE: 97 F

## 2023-07-29 DIAGNOSIS — M16.0 BILATERAL HIP JOINT ARTHRITIS: ICD-10-CM

## 2023-07-29 DIAGNOSIS — M25.551 RIGHT HIP PAIN: Primary | ICD-10-CM

## 2023-07-29 DIAGNOSIS — M47.818 SI JOINT ARTHRITIS: ICD-10-CM

## 2023-07-29 DIAGNOSIS — M79.604 RIGHT LEG PAIN: ICD-10-CM

## 2023-07-29 PROCEDURE — 99203 OFFICE O/P NEW LOW 30 MIN: CPT | Performed by: NURSE PRACTITIONER

## 2023-07-29 PROCEDURE — 73560 X-RAY EXAM OF KNEE 1 OR 2: CPT | Performed by: NURSE PRACTITIONER

## 2023-07-29 PROCEDURE — 73502 X-RAY EXAM HIP UNI 2-3 VIEWS: CPT | Performed by: NURSE PRACTITIONER

## 2023-07-29 NOTE — ED INITIAL ASSESSMENT (HPI)
Pt c/o pain to right hip, thigh, knee, ankle. Pt states \"I feel like my joint is out of place\". Pain is worse during ambulation. States \"the inside of my muscle is burning and itching\". Using tylenol and naproxen without relief. Denies recent injury. Patient states when she is walking she loses her balance. C/o left leg pain also.

## 2023-07-29 NOTE — DISCHARGE INSTRUCTIONS
Follow up with your doctor this week to discuss continued pain management, possible physical therapy to help with your continued and worsening hip, pelvic pain. Take tylenol 650mg every 6 hours for pain. Alternate with naproxen two times a day. ICE to areas of pain, 15 min on 2hours off. Heating pad to back area if having pain. Use cane to help ambulate to avoid falls when pain is severe.

## 2023-08-10 ENCOUNTER — TELEPHONE (OUTPATIENT)
Dept: NEUROLOGY | Facility: CLINIC | Age: 78
End: 2023-08-10

## 2023-08-10 NOTE — TELEPHONE ENCOUNTER
Patient calling to speak to a nurse in hope of on sabina henderson with [facility] on 9/30/21 she states she is experiencing a lot of hip pain and can't wait until 9/26/23. Please call for some medical advice in the meantime or see if Dr.Behar can see her sooner than the schedule time.

## 2023-08-24 ENCOUNTER — OFFICE VISIT (OUTPATIENT)
Dept: PHYSICAL MEDICINE AND REHAB | Facility: CLINIC | Age: 78
End: 2023-08-24
Payer: MEDICARE

## 2023-08-24 VITALS
HEIGHT: 62 IN | SYSTOLIC BLOOD PRESSURE: 112 MMHG | WEIGHT: 177.19 LBS | BODY MASS INDEX: 32.61 KG/M2 | DIASTOLIC BLOOD PRESSURE: 80 MMHG

## 2023-08-24 DIAGNOSIS — M47.816 FACET SYNDROME, LUMBAR: ICD-10-CM

## 2023-08-24 DIAGNOSIS — T39.395A NSAID INDUCED GASTRITIS: ICD-10-CM

## 2023-08-24 DIAGNOSIS — M48.061 LUMBAR FORAMINAL STENOSIS: ICD-10-CM

## 2023-08-24 DIAGNOSIS — M79.10 MYALGIA: Primary | ICD-10-CM

## 2023-08-24 DIAGNOSIS — M51.37 DDD (DEGENERATIVE DISC DISEASE), LUMBOSACRAL: ICD-10-CM

## 2023-08-24 DIAGNOSIS — K29.60 NSAID INDUCED GASTRITIS: ICD-10-CM

## 2023-08-24 DIAGNOSIS — M47.816 LUMBAR SPONDYLOSIS: ICD-10-CM

## 2023-08-24 DIAGNOSIS — M17.10 PRIMARY OSTEOARTHRITIS OF KNEE, UNSPECIFIED LATERALITY: ICD-10-CM

## 2023-08-24 DIAGNOSIS — M51.36 BULGE OF LUMBAR DISC WITHOUT MYELOPATHY: ICD-10-CM

## 2023-08-24 DIAGNOSIS — M86.9 OSTEITIS PUBIS (HCC): ICD-10-CM

## 2023-08-24 DIAGNOSIS — M22.2X9 PATELLOFEMORAL PAIN SYNDROME, UNSPECIFIED LATERALITY: ICD-10-CM

## 2023-08-24 DIAGNOSIS — M54.16 LUMBAR RADICULOPATHY: ICD-10-CM

## 2023-08-24 DIAGNOSIS — E78.5 HYPERLIPIDEMIA, UNSPECIFIED HYPERLIPIDEMIA TYPE: ICD-10-CM

## 2023-08-24 DIAGNOSIS — M54.59 MECHANICAL LOW BACK PAIN: ICD-10-CM

## 2023-08-24 PROCEDURE — 1125F AMNT PAIN NOTED PAIN PRSNT: CPT | Performed by: PHYSICAL MEDICINE & REHABILITATION

## 2023-08-24 PROCEDURE — 3074F SYST BP LT 130 MM HG: CPT | Performed by: PHYSICAL MEDICINE & REHABILITATION

## 2023-08-24 PROCEDURE — 3079F DIAST BP 80-89 MM HG: CPT | Performed by: PHYSICAL MEDICINE & REHABILITATION

## 2023-08-24 PROCEDURE — 99214 OFFICE O/P EST MOD 30 MIN: CPT | Performed by: PHYSICAL MEDICINE & REHABILITATION

## 2023-08-24 PROCEDURE — 1160F RVW MEDS BY RX/DR IN RCRD: CPT | Performed by: PHYSICAL MEDICINE & REHABILITATION

## 2023-08-24 PROCEDURE — 1159F MED LIST DOCD IN RCRD: CPT | Performed by: PHYSICAL MEDICINE & REHABILITATION

## 2023-08-24 PROCEDURE — 3008F BODY MASS INDEX DOCD: CPT | Performed by: PHYSICAL MEDICINE & REHABILITATION

## 2023-08-24 RX ORDER — GABAPENTIN 100 MG/1
100 CAPSULE ORAL 3 TIMES DAILY
Qty: 90 CAPSULE | Refills: 0 | Status: SHIPPED | OUTPATIENT
Start: 2023-08-24

## 2023-08-24 RX ORDER — CELECOXIB 100 MG/1
100 CAPSULE ORAL 2 TIMES DAILY
Qty: 60 CAPSULE | Refills: 0 | Status: SHIPPED | OUTPATIENT
Start: 2023-08-24

## 2023-08-24 NOTE — PATIENT INSTRUCTIONS
1) Please get X-rays of the Lumbar spine today on your way out. 2) Please begin physical therapy as soon as possible. 3) Start gabapentin 100 mg three times per day. If limited improvement, then would increase to 200 mg three times per day. 4) Take Celebrex 100 mg 1 tablet twice per day with food for the next two weeks and then as needed but no more than 2 tablets per day. Do not take with any other NSAIDS (Ibuprofen, Advil, Aleve, Naprosyn etc). OK to take Tylenol 500 mg every 6 hours as needed for pain. If you develop any side effects including stomach aches, nausea, vomiting, or other gastrointestinal symptoms, stop the medication and call my office. 5) Tylenol 500-1000 mg every 6-8 hours as needed for pain. No more than 3000 mg daily.   6) If symptoms do not improve with PT, then would recommend MRI lumbar spine  7) We can try RIGHT knee HA and CSI in the future if symptoms do not improve

## 2023-08-31 ENCOUNTER — TELEPHONE (OUTPATIENT)
Dept: PHYSICAL THERAPY | Facility: HOSPITAL | Age: 78
End: 2023-08-31

## 2023-09-01 ENCOUNTER — OFFICE VISIT (OUTPATIENT)
Dept: PHYSICAL THERAPY | Facility: HOSPITAL | Age: 78
End: 2023-09-01
Attending: PHYSICAL MEDICINE & REHABILITATION
Payer: MEDICARE

## 2023-09-01 DIAGNOSIS — M47.816 FACET SYNDROME, LUMBAR: ICD-10-CM

## 2023-09-01 DIAGNOSIS — M54.16 LUMBAR RADICULOPATHY: ICD-10-CM

## 2023-09-01 DIAGNOSIS — M54.59 MECHANICAL LOW BACK PAIN: ICD-10-CM

## 2023-09-01 DIAGNOSIS — M79.10 MYALGIA: Primary | ICD-10-CM

## 2023-09-01 DIAGNOSIS — M47.816 LUMBAR SPONDYLOSIS: ICD-10-CM

## 2023-09-01 DIAGNOSIS — M51.37 DDD (DEGENERATIVE DISC DISEASE), LUMBOSACRAL: ICD-10-CM

## 2023-09-01 DIAGNOSIS — M51.36 BULGE OF LUMBAR DISC WITHOUT MYELOPATHY: ICD-10-CM

## 2023-09-01 DIAGNOSIS — M48.061 LUMBAR FORAMINAL STENOSIS: ICD-10-CM

## 2023-09-01 PROCEDURE — 97162 PT EVAL MOD COMPLEX 30 MIN: CPT

## 2023-09-01 PROCEDURE — 97110 THERAPEUTIC EXERCISES: CPT

## 2023-09-07 ENCOUNTER — NURSE TRIAGE (OUTPATIENT)
Dept: FAMILY MEDICINE CLINIC | Facility: CLINIC | Age: 78
End: 2023-09-07

## 2023-09-07 NOTE — TELEPHONE ENCOUNTER
Spoke with Pt. Verified name and . Pt states she has another appointment tomorrow for physical therapy at 1:15pm and [facility]. Pt is worried she is not able to make it on time for Dr. Enriquez Better until after 2pm  Pt asking if she can be seen Saturday?   Future Appointments   Date Time Provider Kyle Montiel   2023  1:15 PM Nayeli Dee, PT [facility] NEURO PT EM [facility]

## 2023-09-07 NOTE — TELEPHONE ENCOUNTER
Action Requested: Summary for Provider     []  Critical Lab, Recommendations Needed  [x] Need Additional Advice  []   FYI    []   Need Orders  [] Need Medications Sent to Pharmacy  []  Other     SUMMARY: Pt reports about 1 1/2 weeks ago, noted bilateral big toe pain. Applied 'oil' from home and pain went away, however, noticed left big toenail is purple. Nail is still intact. Denies discoloration of toe, only the nail. Denies pain. Pt able to walk with steady gait. Pt states she goes to physical therapy. Pt wants to be seen by Dr. Yenny Lemus only. Declined video appointment today. Offered other providers, declined other providers. Please advise if Pt can be added tomorrow or Saturday? Reason for call: Toe Pain (1 week ago, big toe both feet pain left foot purple nail)  Onset: Data Unavailable                     Reason for Disposition   Patient wants to be seen    Protocols used:  Toe Pain-A-[place]

## 2023-09-07 NOTE — TELEPHONE ENCOUNTER
Called patient in regards to message below ( identified name and  )  Appointment made , patient understands she may have to wait to be seen as she is being added to Dr. Ananth Franco schedule    Patient verbalizes understanding and agrees with plan.      Future Appointments   Date Time Provider Kyle Montiel   2023  1:15 PM Philpot Drain, PT [facility] NEURO PT EM [facility]   2023 11:40 AM Giorgi Pham MD [facility]   2023  1:15 PM Natasha Drain, PT [facility] NEURO PT EM [facility]   9/15/2023  1:15 PM Natasha Drain, PT [facility] NEURO PT EM [facility]   2023  9:30 AM Philpot Drain, PT [facility] NEURO PT EM [facility]   2023  1:40 PM Joann Wyatt MD [facility]  [facility]

## 2023-09-08 ENCOUNTER — OFFICE VISIT (OUTPATIENT)
Dept: PHYSICAL THERAPY | Facility: HOSPITAL | Age: 78
End: 2023-09-08
Attending: PHYSICAL MEDICINE & REHABILITATION
Payer: MEDICARE

## 2023-09-08 PROCEDURE — 97112 NEUROMUSCULAR REEDUCATION: CPT

## 2023-09-08 PROCEDURE — 97110 THERAPEUTIC EXERCISES: CPT

## 2023-09-08 NOTE — PROGRESS NOTES
Nataliya Alexis Mastache    7/17/1945  Referring Physician:  Rito Pineda  Diagnosis: Myalgia (M79.10)  DDD (degenerative disc disease), lumbosacral (M51.37)  Facet syndrome, lumbar (M47.816)  Mechanical low back pain (M54.59)  Lumbar spondylosis (M47.816)  Bulge of lumbar disc without myelopathy (M51.36)  Lumbar foraminal stenosis (M48.061)  Lumbar radiculopathy (M54.16)  Initial Evaluation Date: 9/1/2023  Date of Surgery: None for this diagnosis   Authorized # of visits NA by 8/2024    Precautions/Hx: chronic R sided HA; HTN, cervical stenosis; redundant colon; sigmoid diverticulosis; C-sec; L forearm ORIF    SUBJECTIVE:     Pt reports that she feels a little better. She states that the back still hurts more when she walks. Visit count 11/30/2023 1/8 2/8    Date 9/1/2023 9/8/2023     LBP/ R LE      Pain Range 3 to 8/10 3-8/10    Pain Ave 5/10 4-5/10       OBJECTIVE:     Treatment performed this date:    Therapeutic Exercise:  Visit #   1/8 2/8   Position Exercise HEP 9/1/2023 9/8/2023    Supine SKC H X X 3x    DKC H X X 5x    LTR H X X 5x    Pelvic tilt H  X 5x heavy cues   Sitting TA set H X     Trunk flex H X x    Pelvic tilt h  X 10x2 heavy cues    Dynadisc pelvic tilt H  X 10x heavy cues          Neuro Re-ed  X dermatomes X see assessment                  H = HEP. Pt given copies of this exercise for home program.  X = Exercises done this date - pt verbalized understanding and demonstrated competence. All exercises done B unless otherwise indicated. Pt advised to discontinue exercises that increase pain and to call or return to therapist to discuss. Each intervention above is specifically prescribed to address the patients identified impairments, activity limitations, and participation restrictions. ASSESSMENT:     Pt had decreased overall pain after beginning level HEP.   Pt educated on stenosis concepts w review of central canal, foramina, facet arthropathy, ligamentum flavum, disc anatomy and behavior. Pt verbalized understanding. Pt shown models, diagrams, demonstration with discussion and verbalized understanding. Pt required some cueing for previously instructed HEP. Began discussion of importance of hip strength for gait stability and lumbopelvic control. Pt at beginning level of pelvic tilt ability and control. Physical Therapy Goals: From 9/1/2023 to 11/30/2023  - Created with patient input during initial evaluation   1. Pt will be independent in beginning level of HEP for stretching, posture and strengthening. 2. Pt will be able to walk 15 min without increased symptoms. 3. Pt will be able to climb flight of stairs without increased symptoms. 4. Pt will be able to step up a curb without increased symptoms. 5. Pt will be able to carry  without increased symptoms. PLAN OF CARE:      Continue PT per original plan for therapeutic exercises, posture retraining, therapeutic activities, manual treatment, neuromuscular reeducation, therapeutic pain neuroscience education, patient education, self care home management, home exercise program, and modalities as needed. Charged Units Units Minutes    Ther Ex 2 30   Neuro 1 15   Ther Activity     Gait     Man Tx          Total Timed  45   Total Tx Time  45         __________________________________________________________________________________________      21st Century Cures Act Notice to Patient: Medical documents like this are made available to patients in the interest of transparency. However, be advised this is a medical document and it is intended as [id number] communication between your medical providers. This medical document may contain abbreviations, assessments, medical data, and results or other terms that are unfamiliar. Medical documents are intended to carry relevant information, facts as evident, and the clinical opinion of the practitioner.  As such, this medical document may be written in language that appears blunt or direct. You are encouraged to contact your medical provider and/or [facility] Experience if you have any questions about this medical document. Objective Initial Evaluation Data 9/1/2023:    Oswestry Disability Index Score  Score: 54 % (9/4/2023  9:26 PM)    Gait:        Deviations: hip and trunk flex; post muscle dominant       Devices: none       Assistance: none      Posture 9/1/2023   Alignment Min forward head position , B shoulder protraction,        Sensation 9/1/2023   Dermatomes Light Touch    Proximal medial thigh R (L2)  wnl   Proximal medial thigh L (L2) wnl   Above knee R (L3) wnl   Above knee L (L3) wnl   Medial arch R(L4) wnl   Medial arch L (L4) wnl   Between 1st - 2nd toes R (L5) wnl   Between 1st - 2nd toes L (L5) wnl   Lateral border of foot R (S1) wnl   Lateral border of foot L (S1) wnl   Popliteal fossa R (S2) wnl   Popliteal fossa L(S2) wnl       Abdominals 9/1/2023   Tone  poor     Diastasis in fingers 9/1/2023   12 cm above umbilicus 2   6 cm above umbilicus 2   At umbilicus 2-3   6 cm below umbilicus 3   12 cm above umbilicus 3      Lumbopelvic 9/1/2023   Control  Poor midline control            ROM Lumbar 9/1/2023   Flexion min   Extension min   R SB Min-mod   L SB Min-mod   R Rotation Min-mod   L Rotation min   * pain limiting    ROM Hip 9/1/2023   Flex R 125 120   Flex L 125 115   ER R 45 40   ER L 45 40   IR R 40 15   IR L 40 25   *pain limiting     MMT 5/5 9/1/2023   L2- hip flex R 5   Hip flex L 5   L3- knee ext R 5   Knee ext L 5   L4- ankle DF R 5   Ankle DF L 5   L5- EHL R 5   EHL L 5   S1- ankle PF R 3-   Ankle PF L 3-   S2- Hams R 5   Hams L 5   *pain limiting    LE flexibility 9/1/2023   Piriformis R min   Piriformis L min   Hams R wnl   Hams L wnl   *pain limiting    Neural mobility 9/1/2023   SLR R wnl   SLR L wnl      HAILEY 9/1/2023   R wnl   L wnl        Imaging:       PROCEDURE: XR KNEE (1 OR 2 VIEWS), RIGHT (CPT=29570)     COMPARISON: None. INDICATIONS: Right knee pain x5 days. No known injury. TECHNIQUE: To views were obtained. FINDINGS:  BONES: No acute fracture or dislocation of the knee. There is mild joint space narrowing and trace marginal osteophytes involving the medial compartment as well as involving the patellofemoral compartment. There is a small superior patellar  enthesophyte in the region of the quadriceps tendon insertion. SOFT TISSUES: Negative. No visible soft tissue swelling. EFFUSION: None visible. OTHER: Negative. Impression   CONCLUSION:     No acute fracture dislocation of the knee. Mild medial compartment and patellofemoral compartment osteoarthrosis. Dictated by (CST): Jennifer Ventura MD on 7/29/2023 at 10:44 AM      Finalized by (CST): Jennifer Ventura MD on 7/29/2023 at 10:47 AM     PROCEDURE: XR HIP W OR WO PELVIS 2 OR 3 VIEWS, RIGHT (CPT=73502)     COMPARISON: [facility]. [place], [place] HIP W OR WO PELVIS (MIN 5 VIEWS), BIL (CPT=73523), 2/14/2023, 12:21 PM.     INDICATIONS: Right hip pain x5 days. No known injury. TECHNIQUE: 3 views were obtained. FINDINGS:  BONES: No acute fracture or dislocation. There is mild bilateral hip osteoarthrosis demonstrated by joint space narrowing. There are degenerative changes involving the symphysis pubis. There is mild bilateral sacroiliac joint degenerative change. There is partially visualized lower lumbar degenerative change. SOFT TISSUES: Negative. No visible soft tissue swelling. EFFUSION: None visible. OTHER: Negative. Impression   CONCLUSION:     No acute fracture or dislocation of the right hip. Multifocal degenerative change involving the hips, sacroiliac joints, symphysis pubis, and visualized lower lumbar spine. These findings are not significantly changed when compared to 02/14/2023.        Dictated by (CST): Jennifer Ventura MD on 7/29/2023 at 10:42 AM      Finalized by (CST): Konstantin Holguin MD on 7/29/2023 at 10:44 AM

## 2023-09-09 ENCOUNTER — OFFICE VISIT (OUTPATIENT)
Dept: FAMILY MEDICINE CLINIC | Facility: CLINIC | Age: 78
End: 2023-09-09

## 2023-09-09 VITALS
DIASTOLIC BLOOD PRESSURE: 62 MMHG | BODY MASS INDEX: 32.09 KG/M2 | WEIGHT: 174.38 LBS | HEART RATE: 65 BPM | HEIGHT: 62 IN | SYSTOLIC BLOOD PRESSURE: 111 MMHG

## 2023-09-09 DIAGNOSIS — M79.675 TOE PAIN, BILATERAL: Primary | ICD-10-CM

## 2023-09-09 DIAGNOSIS — M79.674 TOE PAIN, BILATERAL: Primary | ICD-10-CM

## 2023-09-09 PROCEDURE — 3008F BODY MASS INDEX DOCD: CPT | Performed by: FAMILY MEDICINE

## 2023-09-09 PROCEDURE — 3078F DIAST BP <80 MM HG: CPT | Performed by: FAMILY MEDICINE

## 2023-09-09 PROCEDURE — 1159F MED LIST DOCD IN RCRD: CPT | Performed by: FAMILY MEDICINE

## 2023-09-09 PROCEDURE — 99213 OFFICE O/P EST LOW 20 MIN: CPT | Performed by: FAMILY MEDICINE

## 2023-09-09 PROCEDURE — 1160F RVW MEDS BY RX/DR IN RCRD: CPT | Performed by: FAMILY MEDICINE

## 2023-09-09 PROCEDURE — 3074F SYST BP LT 130 MM HG: CPT | Performed by: FAMILY MEDICINE

## 2023-09-09 RX ORDER — LORATADINE 10 MG/1
10 TABLET ORAL DAILY
Qty: 90 TABLET | Refills: 1 | Status: SHIPPED | OUTPATIENT
Start: 2023-09-09

## 2023-09-09 RX ORDER — MONTELUKAST SODIUM 10 MG/1
10 TABLET ORAL NIGHTLY
Qty: 90 TABLET | Refills: 1 | Status: SHIPPED | OUTPATIENT
Start: 2023-09-09

## 2023-09-09 NOTE — PROGRESS NOTES
Subjective:   Patient ID: Karl Mares is a 66year old female. HPI  Patient for f/u hypertension   Denies any chest pain shortness of breath or headaches. Monitoring blood pressure at home and is below 135/85. Needs refill of medications. Also has some toe discoloration denies any injury   Had pain but pain is gone now  History/Other:   Review of Systems    Constitutional: Negative. Negative for activity change, appetite change, diaphoresis and fatigue. Respiratory: Negative. Negative for apnea, cough, chest tightness and shortness of breath. Cardiovascular: Negative. Negative for chest pain, palpitations and leg swelling. Gastrointestinal: Negative. Negative for abdominal pain. Skin: see hpi  Current Outpatient Medications   Medication Sig Dispense Refill    montelukast 10 MG Oral Tab Take 1 tablet (10 mg total) by mouth nightly. 90 tablet 1    loratadine (CLARITIN) 10 MG Oral Tab Take 1 tablet (10 mg total) by mouth daily. 90 tablet 1    gabapentin 100 MG Oral Cap Take 1 capsule (100 mg total) by mouth 3 (three) times daily. 90 capsule 0    celecoxib (CELEBREX) 100 MG Oral Cap Take 1 capsule (100 mg total) by mouth 2 (two) times daily. 60 capsule 0    nystatin-triamcinolone 100,000-0.1 Units/g-% External Ointment Apply twice daily  Monday-Friday to affected areas of rash. 60 g 1    triamcinolone 0.1 % External Cream Apply topically 2 (two) times daily as needed. 60 g 3    clotrimazole (LOTRIMIN AF) 1 % External Cream Apply 1 each topically 2 (two) times daily. 60 g 1    lisinopril 20 MG Oral Tab Take 1 tablet (20 mg total) by mouth daily. 90 tablet 3    Solifenacin Succinate (VESICARE) 5 MG Oral Tab Take 1 tablet (5 mg total) by mouth daily. 90 tablet 1    fluticasone propionate 50 MCG/ACT Nasal Suspension 1 spray by Nasal route 2 (two) times daily. 16 g 3    naproxen 500 MG Oral Tab Take 1 tablet (500 mg total) by mouth 2 (two) times daily with meals.  120 tablet 0    Omeprazole 40 MG Oral Capsule Delayed Release Take 1 capsule (40 mg total) by mouth daily. Before a meal 30 capsule 2    Timolol Maleate 0.5 % Ophthalmic Solution Apply to eye.  0    latanoprost 0.005 % Ophthalmic Solution Place 1 drop into both eyes nightly. 3 Bottle 2     Allergies:  Seasonal                OTHER (SEE COMMENTS)    Comment:Runny eyes    Objective:   Physical Exam  Constitutional:       Appearance: She is well-developed. Cardiovascular:      Rate and Rhythm: Normal rate and regular rhythm. Heart sounds: Normal heart sounds. Pulmonary:      Effort: Pulmonary effort is normal.      Breath sounds: Normal breath sounds. Skin:     Comments: Left toe nail with dicoloration appears almost hematoma ( but she denies any injury    Neurological:      Mental Status: She is alert. Deep Tendon Reflexes: Reflexes are normal and symmetric. Assessment & Plan:   Toe pain, bilateral  (primary encounter diagnosis)  Will send to podiatrist if pain continues   Hypertension cpm  No orders of the defined types were placed in this encounter. Meds This Visit:  Requested Prescriptions     Signed Prescriptions Disp Refills    montelukast 10 MG Oral Tab 90 tablet 1     Sig: Take 1 tablet (10 mg total) by mouth nightly. loratadine (CLARITIN) 10 MG Oral Tab 90 tablet 1     Sig: Take 1 tablet (10 mg total) by mouth daily.        Imaging & Referrals:  PODIATRY - INTERNAL

## 2023-09-12 ENCOUNTER — OFFICE VISIT (OUTPATIENT)
Dept: PHYSICAL THERAPY | Facility: HOSPITAL | Age: 78
End: 2023-09-12
Attending: PHYSICAL MEDICINE & REHABILITATION
Payer: MEDICARE

## 2023-09-12 PROCEDURE — 97112 NEUROMUSCULAR REEDUCATION: CPT

## 2023-09-12 PROCEDURE — 97110 THERAPEUTIC EXERCISES: CPT

## 2023-09-15 ENCOUNTER — OFFICE VISIT (OUTPATIENT)
Dept: PHYSICAL THERAPY | Facility: HOSPITAL | Age: 78
End: 2023-09-15
Attending: PHYSICAL MEDICINE & REHABILITATION
Payer: MEDICARE

## 2023-09-15 PROCEDURE — 97110 THERAPEUTIC EXERCISES: CPT

## 2023-09-15 PROCEDURE — 97112 NEUROMUSCULAR REEDUCATION: CPT

## 2023-09-19 ENCOUNTER — OFFICE VISIT (OUTPATIENT)
Dept: PHYSICAL THERAPY | Facility: HOSPITAL | Age: 78
End: 2023-09-19
Attending: PHYSICAL MEDICINE & REHABILITATION
Payer: MEDICARE

## 2023-09-19 PROCEDURE — 97112 NEUROMUSCULAR REEDUCATION: CPT

## 2023-09-19 PROCEDURE — 97110 THERAPEUTIC EXERCISES: CPT

## 2023-09-19 NOTE — PROGRESS NOTES
Jayce Snare Mastache    7/17/1945  Referring Physician:  Lukas Campa  Diagnosis: Myalgia (M79.10)  DDD (degenerative disc disease), lumbosacral (M51.37)  Facet syndrome, lumbar (M47.816)  Mechanical low back pain (M54.59)  Lumbar spondylosis (M47.816)  Bulge of lumbar disc without myelopathy (M51.36)  Lumbar foraminal stenosis (M48.061)  Lumbar radiculopathy (M54.16)  Initial Evaluation Date: 9/1/2023  Date of Surgery: None for this diagnosis   Authorized # of visits NA by 8/2024    Precautions/Hx: chronic R sided HA; HTN, cervical stenosis; redundant colon; sigmoid diverticulosis; C-sec; L forearm ORIF    SUBJECTIVE:     Pt reports that she continues to tolerate more activity. Visit count 11/30/2023 1/8 2/8 3/8 4/8 5/8   Date 9/1/2023 9/8/2023  9/12/2023  9/15/2023  9/19/2023    LBP/ R LE        Pain Range 3 to 8/10 3-8/10 3 to 7-8/10 2-3 to 7/10 2-3 to 7/10   Pain Ave 5/10 4-5/10 4/10 4/10 4/10      OBJECTIVE:     Treatment performed this date:    Therapeutic Exercise:  Visit #   3/8 4/8 5/8   Position Exercise HEP 9/12/2023  9/15/2023  9/19/2023    Supine SKC H 3x 3x 3x    DKC H 5x 5x 5x    LTR H 5x 5x 7x    Pelvic tilt H 5x heavy cues 5x 5x    Bridging    X hams cramps X 10x heavy cues    Bridging toes up   X hams cramps     Bridging on bolster H  X 10x X 10x2 cues for breath control   90-90 Lumbar decompression H X       Abdom set  X      Pelvic tilt  X man A, poor control     Sitting TA set H X  X 10x     Trunk flex H X  3x     Pelvic tilt h  5x 5x    Dynadisc pelvic tilt H   5x   Standing Gluteal set   X  X     Hip abd    X 10x, 3# 10#   Gait Patterning    X    Neuro Re-ed  X see assessment  X see assessment  X see assessment                    H = HEP. Pt given copies of this exercise for home program.  X = Exercises done this date - pt verbalized understanding and demonstrated competence. All exercises done B unless otherwise indicated.    Pt advised to discontinue exercises that increase pain and to call or return to therapist to discuss. Each intervention above is specifically prescribed to address the patients identified impairments, activity limitations, and participation restrictions. ASSESSMENT:     Pt remains stable and improved. Pt remains cooperative and motivated, performing HEP consistently. Pt assessed and demonstrates weakness in R>L hip abd. Pt educated on the effects of weak hip abductors causing loss of pelvic control and Trendelenburg gait. Pt shown video example and demonstration. Pt verbalized understanding. Pt able to tolerate addition of 3# wt. Pt educated further on the importance of hip strength and control thru and to end range to allow for normal gait without overfiring of lumbar ps. Pt able to progress glut max to bridge. Pt given V/T cues for muscle activation w stagger step and gait for improved pattern and control. Physical Therapy Goals: From 9/1/2023 to 11/30/2023  - Created with patient input during initial evaluation   1. Pt will be independent in beginning level of HEP for stretching, posture and strengthening. 2. Pt will be able to walk 15 min without increased symptoms. 3. Pt will be able to climb flight of stairs without increased symptoms. 4. Pt will be able to step up a curb without increased symptoms. 5. Pt will be able to carry  without increased symptoms. PLAN OF CARE:      Assess response to beginning level glut med strengthening. Continue PT per original plan for therapeutic exercises, posture retraining, therapeutic activities, manual treatment, neuromuscular reeducation, therapeutic pain neuroscience education, patient education, self care home management, home exercise program, and modalities as needed.     Charged Units Units Minutes   Ther Ex 2 33   Neuro 1 12   Ther Activity     Gait     Man Tx          Total Timed  45   Total Tx Time  45 __________________________________________________________________________________________      21st Century Cures Act Notice to Patient: Medical documents like this are made available to patients in the interest of transparency. However, be advised this is a medical document and it is intended as elxr-lk-hdus communication between your medical providers. This medical document may contain abbreviations, assessments, medical data, and results or other terms that are unfamiliar. Medical documents are intended to carry relevant information, facts as evident, and the clinical opinion of the practitioner. As such, this medical document may be written in language that appears blunt or direct. You are encouraged to contact your medical provider and/or [facility] Patient Experience if you have any questions about this medical document.     Objective Initial Evaluation Data 9/1/2023:    Oswestry Disability Index Score  Score: 54 % (9/4/2023  9:26 PM)    Gait:        Deviations: hip and trunk flex; post muscle dominant       Devices: none       Assistance: none      Posture 9/1/2023   Alignment Min forward head position , B shoulder protraction,        Sensation 9/1/2023   Dermatomes Light Touch    Proximal medial thigh R (L2)  wnl   Proximal medial thigh L (L2) wnl   Above knee R (L3) wnl   Above knee L (L3) wnl   Medial arch R(L4) wnl   Medial arch L (L4) wnl   Between 1st - 2nd toes R (L5) wnl   Between 1st - 2nd toes L (L5) wnl   Lateral border of foot R (S1) wnl   Lateral border of foot L (S1) wnl   Popliteal fossa R (S2) wnl   Popliteal fossa L(S2) wnl       Abdominals 9/1/2023   Tone  poor     Diastasis in fingers 9/1/2023   12 cm above umbilicus 2   6 cm above umbilicus 2   At umbilicus 2-3   6 cm below umbilicus 3   12 cm above umbilicus 3      Lumbopelvic 9/1/2023   Control  Poor midline control        ROM Lumbar 9/1/2023   Flexion min   Extension min   R SB Min-mod   L SB Min-mod   R Rotation Min-mod L Rotation min   * pain limiting    ROM Hip 9/1/2023   Flex R 125 120   Flex L 125 115   ER R 45 40   ER L 45 40   IR R 40 15   IR L 40 25   *pain limiting     MMT 5/5 9/1/2023   L2- hip flex R 5   Hip flex L 5   L3- knee ext R 5   Knee ext L 5   L4- ankle DF R 5   Ankle DF L 5   L5- EHL R 5   EHL L 5   S1- ankle PF R 3-   Ankle PF L 3-   S2- Hams R 5   Hams L 5        9/15/2023    Hip extn R  2   Hip extn L 2        9/19/2023    Hip abd R 2+   Hip abd L 3-   *pain limiting    LE flexibility 9/1/2023   Piriformis R min   Piriformis L min   Hams R wnl   Hams L wnl   *pain limiting    Neural mobility 9/1/2023   SLR R wnl   SLR L wnl      HAILEY 9/1/2023   R wnl   L wnl

## 2023-09-22 ENCOUNTER — OFFICE VISIT (OUTPATIENT)
Dept: PHYSICAL THERAPY | Facility: HOSPITAL | Age: 78
End: 2023-09-22
Attending: PHYSICAL MEDICINE & REHABILITATION
Payer: MEDICARE

## 2023-09-22 PROCEDURE — 97110 THERAPEUTIC EXERCISES: CPT

## 2023-09-22 PROCEDURE — 97530 THERAPEUTIC ACTIVITIES: CPT

## 2023-09-22 PROCEDURE — 97112 NEUROMUSCULAR REEDUCATION: CPT

## 2023-09-22 NOTE — PROGRESS NOTES
[facility] [place]    7/17/1945  Referring Physician:  Tanya Horvath  Diagnosis: Myalgia (M79.10)  DDD (degenerative disc disease), lumbosacral (M51.37)  Facet syndrome, lumbar (M47.816)  Mechanical low back pain (M54.59)  Lumbar spondylosis (M47.816)  Bulge of lumbar disc without myelopathy (M51.36)  Lumbar foraminal stenosis (M48.061)  Lumbar radiculopathy (M54.16)  Initial Evaluation Date: 9/1/2023  Date of Surgery: None for this diagnosis   Authorized # of visits NA by 8/2024    Precautions/Hx: chronic R sided HA; HTN, cervical stenosis; redundant colon; sigmoid diverticulosis; C-sec; L forearm ORIF    SUBJECTIVE:     Pt reports that she has less strong pain in the leg and back. Visit count 11/30/2023 1/8 2/8 3/8 4/8 5/8 6/8   Date 9/1/2023 9/8/2023  9/12/2023  9/15/2023  9/19/2023  9/22/2023    LBP/ R LE         Pain Range 3 to 8/10 3-8/10 3 to 7-8/10 2-3 to 7/10 2-3 to 7/10 2-6/10   Pain Ave 5/10 4-5/10 4/10 4/10 4/10 4/10      OBJECTIVE:     Treatment performed this date:    Therapeutic Exercise:  Visit #   4/8 5/8 6/8   Position Exercise HEP 9/15/2023  9/19/2023  9/22/2023    Supine SKC H 3x 3x 2x    DKC H 5x 5x 5x    LTR H 5x 7x 10x    Pelvic tilt H 5x 5x X 10x3 heavy cues for breath and movement    Bridging   X hams cramps X 10x heavy cues     Bridging toes up  X hams cramps      Bridging on bolster H X 10x X 10x2 cues for breath control 10x2   90-90 Lumbar decompression H       Abdom set        Pelvic tilt       Sitting TA set H X 10x      Trunk flex H 3x      Pelvic tilt h 5x 5x X 10x2    Dynadisc pelvic tilt H  5x     80-80 sit w pelvic tilt H   X 10x2 heavy cues. Standing Gluteal set  X  X      Hip abd   X 10x, 3# 10x 10x 3#   Gait Patterning   X     Neuro Re-ed  X see assessment  X see assessment  X see assessment    Ther Act Function    X supine<>sit           H = HEP.  Pt given copies of this exercise for home program.  X = Exercises done this date - pt verbalized understanding and demonstrated competence. All exercises done B unless otherwise indicated. Pt advised to discontinue exercises that increase pain and to call or return to therapist to discuss. Each intervention above is specifically prescribed to address the patients identified impairments, activity limitations, and participation restrictions. ASSESSMENT:     Pt noting less severe pain max. She verbalizes decreased understanding of radiculopathy as pain generator. Pt given further extensive education. Wendie utilized for translation along w models, diagrams, demonstration and discussion. Continue to emphasize the importance of lumbopelvic control to avoid neural claudication. Discussed afferents and efferents w effects of pain, paraesthesias and weakness. Pt verbalized understanding. Pt continues to have dominant T-L ps activation for stability w breath holding patterns. Pt educated on the importance of correct diaphragm control and positioning to gain core abdominal control and neutral lumbopelvic and cervicothoracic alignment. Pt verbalized understanding. Pt given prolonged time to gain feeling and activation of pelvic tilt control w corrected breathing in both supine and sitting. Pt demonstrates poor supine<>sit transfers. Instructed in corrected transfer from sidelying and pt noted decreased pain. Physical Therapy Goals: From 9/1/2023 to 11/30/2023  - Created with patient input during initial evaluation   1. Pt will be independent in beginning level of HEP for stretching, posture and strengthening. 2. Pt will be able to walk 15 min without increased symptoms. 3. Pt will be able to climb flight of stairs without increased symptoms. 4. Pt will be able to step up a curb without increased symptoms. 5. Pt will be able to carry  without increased symptoms.      PLAN OF CARE:      Assess response to beginning level lumbopelvic control w p tilt  Continue PT per original plan for therapeutic exercises, posture retraining, therapeutic activities, manual treatment, neuromuscular reeducation, therapeutic pain neuroscience education, patient education, self care home management, home exercise program, and modalities as needed. Charged Units Units Minutes    Ther Ex 1 25   Neuro 2 30   Ther Activity 1 8   Gait     Man Tx          Total Timed  63   Total Tx Time  63         __________________________________________________________________________________________      21st Century Cures Act Notice to Patient: Medical documents like this are made available to patients in the interest of transparency. However, be advised this is a medical document and it is intended as [id number] communication between your medical providers. This medical document may contain abbreviations, assessments, medical data, and results or other terms that are unfamiliar. Medical documents are intended to carry relevant information, facts as evident, and the clinical opinion of the practitioner. As such, this medical document may be written in language that appears blunt or direct. You are encouraged to contact your medical provider and/or [facility] if you have any questions about this medical document.     Objective Initial Evaluation Data 9/1/2023:    Oswestry Disability Index Score  Score: 54 % (9/4/2023  9:26 PM)    Gait:        Deviations: hip and trunk flex; post muscle dominant       Devices: none       Assistance: none      Posture 9/1/2023   Alignment Min forward head position , B shoulder protraction,        Sensation 9/1/2023   Dermatomes Light Touch    Proximal medial thigh R (L2)  wnl   Proximal medial thigh L (L2) wnl   Above knee R (L3) wnl   Above knee L (L3) wnl   Medial arch R(L4) wnl   Medial arch L (L4) wnl   Between 1st - 2nd toes R (L5) wnl   Between 1st - 2nd toes L (L5) wnl   Lateral border of foot R (S1) wnl   Lateral border of foot L (S1) wnl   Popliteal fossa R (S2) wnl   Popliteal fossa L(S2) wnl       Abdominals 9/1/2023   Tone  poor     Diastasis in fingers 9/1/2023   12 cm above umbilicus 2   6 cm above umbilicus 2   At umbilicus 2-3   6 cm below umbilicus 3   12 cm above umbilicus 3      Lumbopelvic 9/1/2023   Control  Poor midline control        ROM Lumbar 9/1/2023   Flexion min   Extension min   R SB Min-mod   L SB Min-mod   R Rotation Min-mod   L Rotation min   * pain limiting    ROM Hip 9/1/2023   Flex R 125 120   Flex L 125 115   ER R 45 40   ER L 45 40   IR R 40 15   IR L 40 25   *pain limiting     MMT 5/5 9/1/2023   L2- hip flex R 5   Hip flex L 5   L3- knee ext R 5   Knee ext L 5   L4- ankle DF R 5   Ankle DF L 5   L5- EHL R 5   EHL L 5   S1- ankle PF R 3-   Ankle PF L 3-   S2- Hams R 5   Hams L 5        9/15/2023    Hip extn R  2   Hip extn L 2        9/19/2023    Hip abd R 2+   Hip abd L 3-   *pain limiting    LE flexibility 9/1/2023   Piriformis R min   Piriformis L min   Hams R wnl   Hams L wnl   *pain limiting    Neural mobility 9/1/2023   SLR R wnl   SLR L wnl      HAILEY 9/1/2023   R wnl   L wnl

## 2024-01-23 ENCOUNTER — OFFICE VISIT (OUTPATIENT)
Dept: FAMILY MEDICINE CLINIC | Facility: CLINIC | Age: 79
End: 2024-01-23

## 2024-01-23 VITALS
OXYGEN SATURATION: 97 % | RESPIRATION RATE: 14 BRPM | HEIGHT: 62 IN | DIASTOLIC BLOOD PRESSURE: 57 MMHG | HEART RATE: 54 BPM | WEIGHT: 174 LBS | BODY MASS INDEX: 32.02 KG/M2 | SYSTOLIC BLOOD PRESSURE: 95 MMHG

## 2024-01-23 DIAGNOSIS — M25.551 PAIN OF RIGHT HIP: Primary | ICD-10-CM

## 2024-01-23 PROCEDURE — 3008F BODY MASS INDEX DOCD: CPT | Performed by: FAMILY MEDICINE

## 2024-01-23 PROCEDURE — 3078F DIAST BP <80 MM HG: CPT | Performed by: FAMILY MEDICINE

## 2024-01-23 PROCEDURE — 1125F AMNT PAIN NOTED PAIN PRSNT: CPT | Performed by: FAMILY MEDICINE

## 2024-01-23 PROCEDURE — 3074F SYST BP LT 130 MM HG: CPT | Performed by: FAMILY MEDICINE

## 2024-01-23 PROCEDURE — 99214 OFFICE O/P EST MOD 30 MIN: CPT | Performed by: FAMILY MEDICINE

## 2024-01-23 PROCEDURE — 1159F MED LIST DOCD IN RCRD: CPT | Performed by: FAMILY MEDICINE

## 2024-01-23 PROCEDURE — 1160F RVW MEDS BY RX/DR IN RCRD: CPT | Performed by: FAMILY MEDICINE

## 2024-01-23 RX ORDER — SERTRALINE HYDROCHLORIDE 25 MG/1
TABLET, FILM COATED ORAL
Qty: 90 TABLET | Refills: 0 | Status: SHIPPED | OUTPATIENT
Start: 2024-01-23

## 2024-01-23 NOTE — PROGRESS NOTES
Subjective:   Patient ID: Jesi Ashford is a 78 year old female.    HPI  Patient here for hip low back and knee pain   Has pain most of the days   Pain starts in the back then goes to hip area and right knee also   Also has at time tinnitus and states that that is more at nigfht   Feels more anxious at night   States that went to ent for this in the past but was told that there is not much that they can do about it   History/Other:   Review of Systems    Constitutional: Negative.  Negative for activity change, appetite change, diaphoresis and fatigue.   Heent see hpi   Respiratory: Negative.  Negative for apnea, cough, chest tightness and shortness of breath.    Cardiovascular: Negative.  Negative for chest pain, palpitations and leg swelling.   Gastrointestinal: Negative.  Negative for abdominal pain.   MSK see hpi          Psychiatric/Behavioral: has anxiety    Current Outpatient Medications   Medication Sig Dispense Refill    sertraline (ZOLOFT) 25 MG Oral Tab 1/2 po every day for 7 days then 1 po every day 90 tablet 0    montelukast 10 MG Oral Tab Take 1 tablet (10 mg total) by mouth nightly. 90 tablet 1    loratadine (CLARITIN) 10 MG Oral Tab Take 1 tablet (10 mg total) by mouth daily. 90 tablet 1    gabapentin 100 MG Oral Cap Take 1 capsule (100 mg total) by mouth 3 (three) times daily. 90 capsule 0    nystatin-triamcinolone 100,000-0.1 Units/g-% External Ointment Apply twice daily  Monday-Friday to affected areas of rash. 60 g 1    triamcinolone 0.1 % External Cream Apply topically 2 (two) times daily as needed. 60 g 3    clotrimazole (LOTRIMIN AF) 1 % External Cream Apply 1 each topically 2 (two) times daily. 60 g 1    lisinopril 20 MG Oral Tab Take 1 tablet (20 mg total) by mouth daily. 90 tablet 3    Solifenacin Succinate (VESICARE) 5 MG Oral Tab Take 1 tablet (5 mg total) by mouth daily. 90 tablet 1    fluticasone propionate 50 MCG/ACT Nasal Suspension 1 spray by Nasal route 2 (two) times daily.  16 g 3    naproxen 500 MG Oral Tab Take 1 tablet (500 mg total) by mouth 2 (two) times daily with meals. 120 tablet 0    Omeprazole 40 MG Oral Capsule Delayed Release Take 1 capsule (40 mg total) by mouth daily. Before a meal 30 capsule 2    Timolol Maleate 0.5 % Ophthalmic Solution Apply to eye.  0    latanoprost 0.005 % Ophthalmic Solution Place 1 drop into both eyes nightly. 3 Bottle 2     Allergies:  Allergies   Allergen Reactions    Seasonal OTHER (SEE COMMENTS)     Runny eyes       Objective:   Physical Exam  Constitutional:       Appearance: She is well-developed.   Cardiovascular:      Rate and Rhythm: Normal rate and regular rhythm.      Heart sounds: Normal heart sounds.   Pulmonary:      Effort: Pulmonary effort is normal.      Breath sounds: Normal breath sounds.   Abdominal:      General: Bowel sounds are normal.      Palpations: Abdomen is soft.   Musculoskeletal:         General: Tenderness present.   Neurological:      Mental Status: She is alert and oriented to person, place, and time.      Deep Tendon Reflexes: Reflexes are normal and symmetric.         Assessment & Plan:   1. Pain of right hip    And knee and back - to see physiatrist   2. Anxiety start zoloft     F/u in 2 months     No orders of the defined types were placed in this encounter.      Meds This Visit:  Requested Prescriptions     Signed Prescriptions Disp Refills    sertraline (ZOLOFT) 25 MG Oral Tab 90 tablet 0     Si/2 po every day for 7 days then 1 po every day       Imaging & Referrals:  PHYSIATRY - INTERNAL

## 2024-01-29 ENCOUNTER — HOSPITAL ENCOUNTER (OUTPATIENT)
Dept: GENERAL RADIOLOGY | Facility: HOSPITAL | Age: 79
Discharge: HOME | End: 2024-01-29
Attending: PHYSICAL MEDICINE & REHABILITATION
Payer: MEDICARE

## 2024-01-29 ENCOUNTER — OFFICE VISIT (OUTPATIENT)
Dept: PHYSICAL MEDICINE AND REHAB | Facility: CLINIC | Age: 79
End: 2024-01-29
Payer: MEDICARE

## 2024-01-29 VITALS — HEIGHT: 62 IN | BODY MASS INDEX: 32.02 KG/M2 | WEIGHT: 174 LBS

## 2024-01-29 DIAGNOSIS — M54.59 MECHANICAL LOW BACK PAIN: ICD-10-CM

## 2024-01-29 DIAGNOSIS — M47.816 FACET SYNDROME, LUMBAR: ICD-10-CM

## 2024-01-29 DIAGNOSIS — M54.16 LUMBAR RADICULOPATHY: ICD-10-CM

## 2024-01-29 DIAGNOSIS — M51.37 DDD (DEGENERATIVE DISC DISEASE), LUMBOSACRAL: ICD-10-CM

## 2024-01-29 DIAGNOSIS — M79.10 MYALGIA: ICD-10-CM

## 2024-01-29 DIAGNOSIS — M51.36 BULGE OF LUMBAR DISC WITHOUT MYELOPATHY: ICD-10-CM

## 2024-01-29 DIAGNOSIS — M47.816 LUMBAR SPONDYLOSIS: ICD-10-CM

## 2024-01-29 DIAGNOSIS — M48.061 LUMBAR FORAMINAL STENOSIS: ICD-10-CM

## 2024-01-29 PROCEDURE — 1160F RVW MEDS BY RX/DR IN RCRD: CPT | Performed by: PHYSICAL MEDICINE & REHABILITATION

## 2024-01-29 PROCEDURE — 3008F BODY MASS INDEX DOCD: CPT | Performed by: PHYSICAL MEDICINE & REHABILITATION

## 2024-01-29 PROCEDURE — 1159F MED LIST DOCD IN RCRD: CPT | Performed by: PHYSICAL MEDICINE & REHABILITATION

## 2024-01-29 PROCEDURE — 99214 OFFICE O/P EST MOD 30 MIN: CPT | Performed by: PHYSICAL MEDICINE & REHABILITATION

## 2024-01-29 PROCEDURE — 73565 X-RAY EXAM OF KNEES: CPT | Performed by: PHYSICAL MEDICINE & REHABILITATION

## 2024-01-29 RX ORDER — GABAPENTIN 100 MG/1
200 CAPSULE ORAL NIGHTLY
COMMUNITY
Start: 2024-01-29

## 2024-01-29 NOTE — PATIENT INSTRUCTIONS
1) Tylenol 500-1000 mg every 6-8 hours as needed for pain.  No more than 3000 mg daily.  2) Continue Naprosyn 500 mg 1-2 times per day  3) Continue Gabapentin 200 mg nightly  4) Continue home exercise program   5) My office will call you to schedule the RIGHT knee HA and CSI under ultrasound guidance once the procedure is approved by your insurance carrier.    6) Please call and schedule your MRI at [phone].  Once you have your MRI scheduled, then call my office again to schedule a follow-up visit soon after your MRI so we may review the images together.  7) Please get X-rays of the Lumbar spine and bilateral knees  today on your way out.

## 2024-01-29 NOTE — PROGRESS NOTES
[facility]  Progress Note    CHIEF COMPLAINT:    Chief Complaint   Patient presents with    Follow - Up     LOV: 8/24/2023 pt comes in with bilateral low back tightness, R hip joint, R lateral hip and R knee aching pain. Denies T/N. Admits R knee weakness. States she had R knee XR in [place], she did not bring imaging with her. Completed PT, admits HEP. Rates pain 0/10 now. Worsens when walking around.        History of Present Illness:  The patient is a 78 year old ambidextrous female with significant past medical history of hypertension who presents with low back pain as well as bilateral hip pain with radiation to the anterior thighs.  She has completed a full course of physical therapy with Lynette.  I reviewed RIGHT's notes.  Today she rates her pain a 0 out of 10 but her symptoms are aggravated more so in the morning when she starts walking.  Her symptoms are worse with standing and walking for long periods of time.  Her symptoms are improved by rest.  She is also taking Naprosyn 750 mg daily at her own direction.  She does have radicular symptoms down the right leg.  She is also having right groin pain and right knee pain which is worse with standing and walking.  She has had x-rays of the pelvis which I reviewed.  She has also had x-rays of the right knee although these were nonweightbearing films.    PAST MEDICAL HISTORY:  Past Medical History:   Diagnosis Date    Acute vulvitis 11/29/2022    Allergic conjunctivitis     Cataract     Cataract of both eyes 6/13/2019    Cataract of both eyes 6/13/2019    Chronic right shoulder pain 9/30/2021    Chronic right-sided headaches 9/27/2017    Chronic right-sided headaches 9/27/2017    Chronic upper back pain 4/11/2017    Dry eye syndrome     Dry eye syndrome of both eyes 6/13/2019    Dry eye syndrome of both eyes 6/13/2019    Essential hypertension     External hemorrhoids 12/11/2010    External hemorrhoids 12/11/2010     Foraminal stenosis of cervical region 2021    Foraminal stenosis of cervical region 2021    Glaucoma     Hiatal hernia 2010    Hiatal hernia 2010    Melanosis coli 2010    NSAID induced gastritis 2021    NSAID induced gastritis 2021    Psychophysiological insomnia 2018    Redundant colon 2010    Redundant colon 2010    Shingles     Sigmoid diverticulosis 2010    Sigmoid diverticulosis 2010    Smokers' cough (HCC) 2023    Tendinopathy of rotator cuff 2021    Trigger point of neck 2021    Trigger point of neck 2021    Trigger point of neck 2021       SURGICAL HISTORY:  Past Surgical History:   Procedure Laterality Date          COLONOSCOPY  2010    external hemorrhoids, redundant/tortuous colon, sigmoid diverticulosis, and melanosis coli    EGD  2010    hiatal hernia and gastritis    OTHER SURGICAL HISTORY Left 2013    L forearm ORIF       SOCIAL HISTORY:   Social History     Occupational History    Not on file   Tobacco Use    Smoking status: Never    Smokeless tobacco: Never   Vaping Use    Vaping Use: Never used   Substance and Sexual Activity    Alcohol use: No    Drug use: No    Sexual activity: Not on file       FAMILY HISTORY:   Family History   Problem Relation Age of Onset    Arthritis Father     Cancer Mother         uterine CA    Diabetes Son     Musculo-skelatal Disorder Son         scoliosis       CURRENT MEDICATIONS:   Current Outpatient Medications   Medication Sig Dispense Refill    gabapentin 100 MG Oral Cap Take 2 capsules (200 mg total) by mouth nightly.      sertraline (ZOLOFT) 25 MG Oral Tab 1/2 po every day for 7 days then 1 po every day 90 tablet 0    montelukast 10 MG Oral Tab Take 1 tablet (10 mg total) by mouth nightly. 90 tablet 1    loratadine (CLARITIN) 10 MG Oral Tab Take 1 tablet (10 mg total) by mouth daily. 90 tablet 1    nystatin-triamcinolone 100,000-0.1 Units/g-%  External Ointment Apply twice daily  Monday-Friday to affected areas of rash. 60 g 1    triamcinolone 0.1 % External Cream Apply topically 2 (two) times daily as needed. 60 g 3    clotrimazole (LOTRIMIN AF) 1 % External Cream Apply 1 each topically 2 (two) times daily. 60 g 1    lisinopril 20 MG Oral Tab Take 1 tablet (20 mg total) by mouth daily. 90 tablet 3    Solifenacin Succinate (VESICARE) 5 MG Oral Tab Take 1 tablet (5 mg total) by mouth daily. 90 tablet 1    fluticasone propionate 50 MCG/ACT Nasal Suspension 1 spray by Nasal route 2 (two) times daily. 16 g 3    naproxen 500 MG Oral Tab Take 1 tablet (500 mg total) by mouth 2 (two) times daily with meals. 120 tablet 0    Omeprazole 40 MG Oral Capsule Delayed Release Take 1 capsule (40 mg total) by mouth daily. Before a meal 30 capsule 2    Timolol Maleate 0.5 % Ophthalmic Solution Apply to eye.  0    latanoprost 0.005 % Ophthalmic Solution Place 1 drop into both eyes nightly. 3 Bottle 2       ALLERGIES:   Allergies   Allergen Reactions    Seasonal OTHER (SEE COMMENTS)     Runny eyes       REVIEW OF SYSTEMS:   Review of Systems   Constitutional: Negative.    HENT: Negative.    Eyes: Negative.    Respiratory: Negative.    Cardiovascular: Negative.    Gastrointestinal: Negative.    Genitourinary: Negative.    Musculoskeletal: As per HPI  Skin: Negative.    Neurological: As per HPI  Endo/Heme/Allergies: Negative.    Psychiatric/Behavioral: Negative.      All other systems reviewed and are negative. Pertinent positives and negatives noted in the HPI.        PHYSICAL EXAM:   Ht 62\"   Wt 174 lb (78.9 kg)   BMI 31.83 kg/m²     Body mass index is 31.83 kg/m².      General: No immediate distress  Head: Normocephalic/ Atraumatic  Eyes: Extra-occular movements intact.   Ears: No auricular hematoma or deformities  Mouth: No lesions or ulcerations  Heart: peripheral pulses intact. Normal capillary refill.   Lungs: Non-labored respirations  Abdomen: No abdominal  guarding  Extremities: No lower extremity edema bilaterally   Skin: No lesions noted.   Cognition: alert & oriented x 3, attentive, able to follow 2 step commands, comprehention intact, spontaneous speech intact  Motor:    Musculoskeletal:        Data  No visits with results within 6 Month(s) from this visit.   Latest known visit with results is:   Office Visit on 06/01/2023   Component Date Value Ref Range Status    Glucose 06/01/2023 108 (H)  70 - 99 mg/dL Final    Sodium 06/01/2023 137  136 - 145 mmol/L Final    Potassium 06/01/2023 4.8  3.5 - 5.1 mmol/L Final    Chloride 06/01/2023 106  98 - 112 mmol/L Final    CO2 06/01/2023 27.0  21.0 - 32.0 mmol/L Final    Anion Gap 06/01/2023 4  0 - 18 mmol/L Final    BUN 06/01/2023 22 (H)  7 - 18 mg/dL Final    Creatinine 06/01/2023 0.82  0.55 - 1.02 mg/dL Final    BUN/CREA Ratio 06/01/2023 26.8 (H)  10.0 - 20.0 Final    Calcium, Total 06/01/2023 9.3  8.5 - 10.1 mg/dL Final    Calculated Osmolality 06/01/2023 288  275 - 295 mOsm/kg Final    eGFR-Cr 06/01/2023 74  >=60 mL/min/1.73m2 Final    Patient Fasting for BMP? 06/01/2023 No   Final    Magnesium 06/01/2023 2.3  1.6 - 2.6 mg/dL Final    CK 06/01/2023 101  26 - 192 U/L Final    Urine Culture 06/01/2023 No Growth at 18-24 hrs.   Final   ]      Radiology Imaging:  I reviewed with the patient her X-ray of the pelvis right and knees right   XR KNEE (1 OR 2 VIEWS), RIGHT (CPT=73560)  Narrative: PROCEDURE: XR KNEE (1 OR 2 VIEWS), RIGHT (CPT=73560)     COMPARISON: None.     INDICATIONS: Right knee pain x5 days. No known injury.     TECHNIQUE: To views were obtained.       FINDINGS:   BONES: No acute fracture or dislocation of the knee.  There is mild joint space narrowing and trace marginal osteophytes involving the medial compartment as well as involving the patellofemoral compartment.  There is a small superior patellar   enthesophyte in the region of the quadriceps tendon insertion.  SOFT TISSUES: Negative. No visible soft  tissue swelling.   EFFUSION: None visible.   OTHER: Negative.               Impression: CONCLUSION:      No acute fracture dislocation of the knee.       Mild medial compartment and patellofemoral compartment osteoarthrosis.          Dictated by (CST): Javier Dillon MD on 7/29/2023 at 10:44 AM       Finalized by (CST): Javier Dillon MD on 7/29/2023 at 10:47 AM          XR HIP W OR WO PELVIS 2 OR 3 VIEWS, RIGHT (CPT=73502)  Narrative: PROCEDURE: XR HIP W OR WO PELVIS 2 OR 3 VIEWS, RIGHT (CPT=73502)     COMPARISON: [facility], XR HIP W OR WO PELVIS (MIN 5 VIEWS), BILAT (CPT=73523), 2/14/2023, 12:21 PM.     INDICATIONS: Right hip pain x5 days. No known injury.     TECHNIQUE: 3 views were obtained.       FINDINGS:   BONES: No acute fracture or dislocation.  There is mild bilateral hip osteoarthrosis demonstrated by joint space narrowing.  There are degenerative changes involving the symphysis pubis.  There is mild bilateral sacroiliac joint degenerative change.    There is partially visualized lower lumbar degenerative change.  SOFT TISSUES: Negative. No visible soft tissue swelling.   EFFUSION: None visible.   OTHER: Negative.               Impression: CONCLUSION:      No acute fracture or dislocation of the right hip.     Multifocal degenerative change involving the hips, sacroiliac joints, symphysis pubis, and visualized lower lumbar spine.  These findings are not significantly changed when compared to 02/14/2023.       Dictated by (CST): Javier Dillon MD on 7/29/2023 at 10:42 AM       Finalized by (CST): Javier Dillon MD on 7/29/2023 at 10:44 AM              ASSESSMENT AND PLAN:  Jesi is a 78-year-old female who presents for follow-up with continued bilateral low back pain with right buttock pain, right lateral leg pain and groin pain, and right knee pain.  First for the low back pain, I am recommending she continue with physical therapy and home exercise program.  I am also  recommending x-rays of the lumbar spine.  She should continue her gabapentin 200 mg at nighttime.  I have counseled her on naproxen as well as its risks and side effects.  I have urged her to take 1 tablet twice a day of the 500 mg tablets.  She can also continue to use Tylenol for pain.  I have ordered a right knee hyaluronic acid and corticosteroid injection under ultrasound guidance as I believe her knee pain is due to osteoarthritis and patellofemoral syndrome.  I have also ordered an MRI of the lumbar spine to further investigate her symptoms.       RTC in 2 to 4 weeks for MRI review and for the right knee injection  Discharge Instructions were provided as documented in AVS summary.  The patient was in agreement with the assessment and plan.  All questions were answered.  There were no barriers to learning.         1. Myalgia    2. DDD (degenerative disc disease), lumbosacral    3. Facet syndrome, lumbar    4. Mechanical low back pain    5. Lumbar spondylosis    6. Bulge of lumbar disc without myelopathy    7. Lumbar foraminal stenosis    8. Lumbar radiculopathy        Alex B. Behar MD  Physical Medicine and Rehabilitation/Sports Medicine  [facility]

## 2024-01-30 ENCOUNTER — TELEPHONE (OUTPATIENT)
Dept: PHYSICAL MEDICINE AND REHAB | Facility: CLINIC | Age: 79
End: 2024-01-30

## 2024-01-30 NOTE — TELEPHONE ENCOUNTER
Initiated authorization for RIGHT knee Durolane and CSI under ultrasound guidance CPT/HCPCS 03550, ,  with Riri VERA at [facility]  Case #[id number].  Authorization is not required for Durolane, it is preferred under patients plan Medical [employer]  Status: referral Approved w/ authorization #[id number] valid 1/30/24-4/30/24

## 2024-02-07 NOTE — PROGRESS NOTES
HPI:    Patient ID: Alix Whittington is a 67year old female.     1wk w/ sore throat, hoarseness, dry cough, chest congestion, ear pressure/ringing  No f/c, nasal congestion, runny nose, SOB  Taking loratidine, vicks, motrin, nyquil      Of note, emery distress. HENT:   Head: Normocephalic and atraumatic. Right Ear: Tympanic membrane normal.   Left Ear: Tympanic membrane normal.   Nose: Nose normal. Right sinus exhibits no maxillary sinus tenderness and no frontal sinus tenderness.  Left sinus exhibit INTERNAL  US PELVIS (TRANSABDOMINAL AND TRANSVAGINAL) (CPT=76856/04044)         FV#[id number] Alert-The patient is alert, awake and responds to voice. The patient is oriented to time, place, and person. The triage nurse is able to obtain subjective information.

## 2024-02-13 ENCOUNTER — OFFICE VISIT (OUTPATIENT)
Dept: FAMILY MEDICINE CLINIC | Facility: CLINIC | Age: 79
End: 2024-02-13

## 2024-02-13 VITALS
SYSTOLIC BLOOD PRESSURE: 105 MMHG | BODY MASS INDEX: 32.69 KG/M2 | WEIGHT: 177.63 LBS | TEMPERATURE: 97 F | DIASTOLIC BLOOD PRESSURE: 59 MMHG | HEART RATE: 63 BPM | OXYGEN SATURATION: 99 % | HEIGHT: 62 IN

## 2024-02-13 DIAGNOSIS — M48.02 FORAMINAL STENOSIS OF CERVICAL REGION: ICD-10-CM

## 2024-02-13 DIAGNOSIS — E78.5 HYPERLIPIDEMIA, UNSPECIFIED HYPERLIPIDEMIA TYPE: ICD-10-CM

## 2024-02-13 DIAGNOSIS — I10 ESSENTIAL HYPERTENSION: ICD-10-CM

## 2024-02-13 DIAGNOSIS — H40.1133 PRIMARY OPEN ANGLE GLAUCOMA (POAG) OF BOTH EYES, SEVERE STAGE: ICD-10-CM

## 2024-02-13 DIAGNOSIS — Z00.00 ENCOUNTER FOR ANNUAL HEALTH EXAMINATION: ICD-10-CM

## 2024-02-13 DIAGNOSIS — H90.3 SENSORINEURAL HEARING LOSS (SNHL) OF BOTH EARS: Primary | ICD-10-CM

## 2024-02-13 DIAGNOSIS — H47.20 RIGHT OPTIC NERVE ATROPHY: ICD-10-CM

## 2024-02-13 DIAGNOSIS — K63.89 MELANOSIS COLI: ICD-10-CM

## 2024-02-13 DIAGNOSIS — R39.15 URINARY URGENCY: ICD-10-CM

## 2024-02-13 DIAGNOSIS — K21.00 GASTROESOPHAGEAL REFLUX DISEASE WITH ESOPHAGITIS WITHOUT HEMORRHAGE: ICD-10-CM

## 2024-02-13 RX ORDER — SOLIFENACIN SUCCINATE 5 MG/1
5 TABLET, FILM COATED ORAL DAILY
Qty: 90 TABLET | Refills: 1 | Status: SHIPPED | OUTPATIENT
Start: 2024-02-13

## 2024-02-13 RX ORDER — NEBIVOLOL 5 MG/1
5 TABLET ORAL DAILY
Qty: 90 TABLET | Refills: 1 | Status: SHIPPED | OUTPATIENT
Start: 2024-02-13

## 2024-02-13 NOTE — PROGRESS NOTES
Subjective:   Jesi Ashford is a 78 year old female who presents for a Medicare Subsequent Annual Wellness visit (Pt already had Initial Annual Wellness) and stable chronic illnesses (not addressed in visit).       History/Other:   Fall Risk Assessment:   She has been screened for Falls and is High Risk. Fall Prevention information provided to patient in After Visit Summary.    Do you feel unsteady when standing or walking?: Yes  Do you worry about falling?: No  Have you fallen in the past year?: No     Cognitive Assessment:   She had a completely normal cognitive assessment - see flowsheet entries     Functional Ability/Status:   Jesi Ashford has some abnormal functions as listed below:  She has Hearing problems based on screening of functional status.She has Vision problems based on screening of functional status. She has Walking problems based on screening of functional status.       Depression Screening (PHQ-2/PHQ-9): PHQ-2 SCORE: 0  , done 2/13/2024        5 minutes spent screening and counseling for depression    Advanced Directives:   She does NOT have a Living Will. [Do you have a living will?: No]  She does NOT have a Power of  for Health Care. [Do you have a healthcare power of ?: No]  Not discussed      Patient Active Problem List   Diagnosis    Primary open angle glaucoma (POAG) of both eyes, severe stage    Essential hypertension    Chronic upper back pain    Hyperlipidemia    Dry eye syndrome of both eyes    Class 1 obesity due to excess calories with serious comorbidity and body mass index (BMI) of 32.0 to 32.9 in adult    Gastroesophageal reflux disease with esophagitis    Chronic right-sided headaches    External hemorrhoids    Sigmoid diverticulosis    Redundant colon    Melanosis coli    Hiatal hernia    Tinnitus of both ears    Sensorineural hearing loss (SNHL) of both ears    Cataract of both eyes    Right optic nerve atrophy    Tendinopathy of rotator cuff     Chronic right shoulder pain    Degeneration of intervertebral disc of cervical region with osteophyte of cervical vertebra    Foraminal stenosis of cervical region    Cervical facet syndrome    Trigger point of neck    NSAID induced gastritis    Acute vulvitis    Smokers' cough (HCC)     Allergies:  She is allergic to seasonal.    Current Medications:  Outpatient Medications Marked as Taking for the 2/13/24 encounter (Office Visit) with Kenya Plascencia MD   Medication Sig    Solifenacin Succinate (VESICARE) 5 MG Oral Tab Take 1 tablet (5 mg total) by mouth daily.    nebivolol 5 MG Oral Tab Take 1 tablet (5 mg total) by mouth daily.    gabapentin 100 MG Oral Cap Take 2 capsules (200 mg total) by mouth nightly.    montelukast 10 MG Oral Tab Take 1 tablet (10 mg total) by mouth nightly.    loratadine (CLARITIN) 10 MG Oral Tab Take 1 tablet (10 mg total) by mouth daily.    nystatin-triamcinolone 100,000-0.1 Units/g-% External Ointment Apply twice daily  Monday-Friday to affected areas of rash.    triamcinolone 0.1 % External Cream Apply topically 2 (two) times daily as needed.    clotrimazole (LOTRIMIN AF) 1 % External Cream Apply 1 each topically 2 (two) times daily.    lisinopril 20 MG Oral Tab Take 1 tablet (20 mg total) by mouth daily.    naproxen 500 MG Oral Tab Take 1 tablet (500 mg total) by mouth 2 (two) times daily with meals.    Timolol Maleate 0.5 % Ophthalmic Solution Apply to eye.    latanoprost 0.005 % Ophthalmic Solution Place 1 drop into both eyes nightly.       Medical History:  She  has a past medical history of Acute vulvitis (11/29/2022), Allergic conjunctivitis, Cataract, Cataract of both eyes (06/13/2019), Cataract of both eyes (06/13/2019), Chronic right shoulder pain (09/30/2021), Chronic right-sided headaches (09/27/2017), Chronic right-sided headaches (09/27/2017), Chronic upper back pain (04/11/2017), Dry eye syndrome, Dry eye syndrome of both eyes (06/13/2019), Dry eye syndrome of both eyes  (2019), Essential hypertension, External hemorrhoids (2010), External hemorrhoids (2010), Foraminal stenosis of cervical region (2021), Foraminal stenosis of cervical region (2021), Glaucoma, Hiatal hernia (2010), Hiatal hernia (2010), Melanosis coli (2010), NSAID induced gastritis (2021), NSAID induced gastritis (2021), NSAID induced gastritis (2021), Psychophysiological insomnia (2018), Redundant colon (2010), Redundant colon (2010), Redundant colon (2010), Shingles, Sigmoid diverticulosis (2010), Sigmoid diverticulosis (2010), Sigmoid diverticulosis (2010), Smokers' cough (HCC) (2023), Smokers' cough (HCC) (2023), Tendinopathy of rotator cuff (2021), Tinnitus of both ears (10/02/2018), Trigger point of neck (2021), Trigger point of neck (2021), Trigger point of neck (2021), and Trigger point of neck (2021).  Surgical History:  She  has a past surgical history that includes other surgical history (Left, 2013);  (); egd (2010); and colonoscopy (2010).   Family History:  Her family history includes Arthritis in her father; Cancer in her mother; Diabetes in her son; Musculo-skelatal Disorder in her son.  Social History:  She  reports that she has never smoked. She has never used smokeless tobacco. She reports that she does not drink alcohol and does not use drugs.    Tobacco:  She has never smoked tobacco.    CAGE Alcohol Screen:   CAGE screening score of 0 on 2024, showing low risk of alcohol abuse.      Patient Care Team:  Kenya Plascencia MD as PCP - General (Family Medicine)  Domingo Tarango MD Mora, Guillermo, MD Martinez, Marco L, MD (OBSTETRICS & GYNECOLOGY)  Selene Verdugo, SLP as Speech Language Pathologist (Speech-Language Pathologist)  Jaylin Lau, PT as Physical Therapist (Physical Therapy)  Lynette Edge, PT  (Physical Therapy)    Review of Systems  GENERAL: feels well otherwise  SKIN: denies any unusual skin lesions  EYES: denies blurred vision or double vision  HEENT: denies nasal congestion, sinus pain or ST  LUNGS: denies shortness of breath with exertion  CARDIOVASCULAR: denies chest pain on exertion  GI: denies abdominal pain, denies heartburn  : denies dysuria, vaginal discharge or itching, no complaint of urinary incontinence   MUSCULOSKELETAL: complaining about knee pain  NEURO: denies headaches  PSYCHE: denies depression or anxiety  HEMATOLOGIC: denies hx of anemia  ENDOCRINE: denies thyroid history  ALL/ASTHMA: denies hx of allergy or asthma    Objective:   Physical Exam  General Appearance:  Alert, cooperative, no distress, appears stated age   Head:  Normocephalic, without obvious abnormality, atraumatic   Eyes:  PERRL, conjunctiva/corneas clear, EOM's intact both eyes   Ears:  Normal TM's and external ear canals, both ears   Nose: Nares normal, septum midline,mucosa normal, no drainage or sinus tenderness   Throat: Lips, mucosa, and tongue normal; teeth and gums normal   Neck: Supple, symmetrical, trachea midline, no adenopathy;  thyroid: not enlarged, symmetric, no tenderness/mass/nodules; no carotid bruit or JVD   Back:   Symmetric, no curvature, ROM normal, no CVA tenderness   Lungs:   Clear to auscultation bilaterally, respirations unlabored   Heart:  Regular rate and rhythm, S1 and S2 normal, no murmur, rub, or gallop   Abdomen:   Soft, non-tender, bowel sounds active all four quadrants,  no masses, no organomegaly   Pelvic: Deferred   Extremities: Extremities knee tenderness   Pulses: 2+ and symmetric   Skin: Skin color, texture, turgor normal, no rashes or lesions   Lymph nodes: Cervical, supraclavicular, and axillary nodes normal   Neurologic: Normal       /59   Pulse 63   Temp 97.4 °F (36.3 °C) (Temporal)   Ht 5' 2\" (1.575 m)   Wt 177 lb 9.6 oz (80.6 kg)   SpO2 99%   BMI 32.48 kg/m²   Estimated body mass index is 32.48 kg/m² as calculated from the following:    Height as of this encounter: 5' 2\" (1.575 m).    Weight as of this encounter: 177 lb 9.6 oz (80.6 kg).    Medicare Hearing Assessment:   Hearing Screening    Screening Method: Questionnaire  I have a problem hearing over the telephone: No I have trouble following the conversations when two or more people are talking at the same time: No   I have trouble understanding things on the TV: No I have to strain to understand conversations: No   I have to worry about missing the telephone ring or doorbell: No I have trouble hearing conversations in a noisy background such as a crowded room or restaurant: No   I get confused about where sounds come from: No I misunderstand some words in a sentence and need to ask people to repeat themselves: No   I especially have trouble understanding the speech of women and children: No I have trouble understanding the speaker in a large room such as at a meeting or place of Yazidi: No   Many people I talk to seem to mumble (or don't speak clearly): No People get annoyed because I misunderstand what they say: No   I misunderstand what others are saying and make inappropriate responses: No I avoid social activities because I cannot hear well and fear I will reply improperly: No   Family members and friends have told me they think I may have hearing loss: No                   Assessment & Plan:   Jesi Ashford is a 78 year old female who presents for a Medicare Assessment.     1. Sensorineural hearing loss (SNHL) of both ears (Primary)  Stable cpm  2. Right optic nerve atrophy  Needs to see ophthalmology  -     Ophthalmology Referral - In Network  3. Primary open angle glaucoma (POAG) of both eyes, severe stage  See ophthalmology  Overview:  With Optic Nerve Atrophy R > L  New onset tinnitus, R sided HA    Orders:  -     Ophthalmology Referral - In Network  4. Melanosis coli  5. Hyperlipidemia,  unspecified hyperlipidemia type  Stable cpm  6. Gastroesophageal reflux disease with esophagitis without hemorrhage  Stable cpm  7. Foraminal stenosis of cervical region  Stable cpm per dr behar  8. Essential hypertension  Uncontrolled per patient add bystolic  -     Comp Metabolic Panel (14)  -     Assay, Thyroid Stim Hormone  -     CBC With Differential With Platelet  9. Urinary urgency  Will start vesicare  Other orders  -     Solifenacin Succinate; Take 1 tablet (5 mg total) by mouth daily.  Dispense: 90 tablet; Refill: 1  -     Nebivolol HCl; Take 1 tablet (5 mg total) by mouth daily.  Dispense: 90 tablet; Refill: 1    The patient indicates understanding of these issues and agrees to the plan.  Reinforced healthy diet, lifestyle, and exercise.      No follow-ups on file.     Kenya Plascencia MD, 2/13/2024     Supplementary Documentation:   General Health:  In the past six months, have you lost more than 10 pounds without trying?: 2 - No  Has your appetite been poor?: No  Type of Diet: Balanced  How does the patient maintain a good energy level?: Stretching  How would you describe your daily physical activity?: Light  How would you describe your current health state?: Fair  How do you maintain positive mental well-being?: Social Interaction  On a scale of 0 to 10, with 0 being no pain and 10 being severe pain, what is your pain level?: 5 - (Moderate)  In the past six months, have you experienced urine leakage?: 1-Yes  At any time do you feel concerned for the safety/well-being of yourself and/or your children, in your home or elsewhere?: No  Have you had any immunizations at another office such as Influenza, Hepatitis B, Tetanus, or Pneumococcal?: Yes       Jesi Ashford's SCREENING SCHEDULE   Tests on this list are recommended by your physician but may not be covered, or covered at this frequency, by your insurer.   Please check with your insurance carrier before scheduling to verify coverage.    PREVENTATIVE SERVICES FREQUENCY &  COVERAGE DETAILS LAST COMPLETION DATE   Diabetes Screening    Fasting Blood Sugar /  Glucose    One screening every 12 months if never tested or if previously tested but not diagnosed with pre-diabetes   One screening every 6 months if diagnosed with pre-diabetes Lab Results   Component Value Date     (H) 06/01/2023        Cardiovascular Disease Screening    Lipid Panel  Cholesterol  Lipoprotein (HDL)  Triglycerides Covered every 5 years for all Medicare beneficiaries without apparent signs or symptoms of cardiovascular disease Lab Results   Component Value Date    CHOLEST 178 07/13/2022    HDL 57 07/13/2022     (H) 07/13/2022    TRIG 106 07/13/2022         Electrocardiogram (EKG)   Covered if needed at Welcome to Medicare, and non-screening if indicated for medical reasons 03/30/2020      Ultrasound Screening for Abdominal Aortic Aneurysm (AAA) Covered once in a lifetime for one of the following risk factors    Men who are 65-75 years old and have ever smoked    Anyone with a family history -     Colorectal Cancer Screening  Covered for ages 50-85; only need ONE of the following:    Colonoscopy   Covered every 10 years    Covered every 2 years if patient is at high risk or previous colonoscopy was abnormal -    No recommendations at this time    Flexible Sigmoidoscopy   Covered every 4 years -    Fecal Occult Blood Test Covered annually -   Bone Density Screening    Bone density screening    Covered every 2 years after age 65 if diagnosed with risk of osteoporosis or estrogen deficiency.    Covered yearly for long-term glucocorticoid medication use (Steroids) Last Dexa Scan:    XR DEXA BONE DENSITOMETRY (CPT=77080) 03/09/2022      No recommendations at this time   Pap and Pelvic    Pap   Covered every 2 years for women at normal risk; Annually if at high risk -  No recommendations at this time    Chlamydia Annually if high risk -  No recommendations at this time    Screening Mammogram    Mammogram     Recommend annually for all female patients aged 40 and older    One baseline mammogram covered for patients aged 35-39 02/24/2022    No recommendations at this time    Immunizations    Influenza Covered once per flu season  Please get every year -  Influenza Vaccine(1) due on 10/01/2023    Pneumococcal Each vaccine ([id number] & [id number]) covered once after 65 Prevnar 13: 05/03/2016    [id number]: 07/15/2017     No recommendations at this time    Hepatitis B One screening covered for patients with certain risk factors   -  No recommendations at this time    Tetanus Toxoid Not covered by Medicare Part B unless medically necessary (cut with metal); may be covered with your pharmacy prescription benefits -    Tetanus, Diptheria and Pertusis TD and TDaP Not covered by Medicare Part B -  No recommendations at this time    Zoster Not covered by Medicare Part B; may be covered with your pharmacy  prescription benefits 03/17/2016  No recommendations at this time     Annual Monitoring of Persistent Medications (ACE/ARB, digoxin diuretics, anticonvulsants)    Potassium Annually Lab Results   Component Value Date    K 4.8 06/01/2023         Creatinine   Annually Lab Results   Component Value Date    CREATSERUM 0.82 06/01/2023         BUN Annually Lab Results   Component Value Date    BUN 22 (H) 06/01/2023       Drug Serum Conc Annually No results found for: \"DIGOXIN\", \"DIG\", \"VALP\"           Chronic Obstructive Pulmonary Disease (COPD)    Spirometry Annually Spirometry date:

## 2024-02-14 NOTE — PROGRESS NOTES
Subjective:   Jesi Ashford is a 78 year old female who presents for a Medicare Subsequent Annual Wellness visit (Pt already had Initial Annual Wellness) and scheduled follow up of multiple significant but stable problems.       History/Other:   Fall Risk Assessment:   She has been screened for Falls and is High Risk. Fall Prevention information provided to patient in After Visit Summary.    Do you feel unsteady when standing or walking?: Yes  Do you worry about falling?: No  Have you fallen in the past year?: No     Cognitive Assessment:   She had a completely normal cognitive assessment - see flowsheet entries     Functional Ability/Status:   Jesi Ashford has some abnormal functions as listed below:  She has Hearing problems based on screening of functional status.She has Vision problems based on screening of functional status. She has Walking problems based on screening of functional status.       Depression Screening (PHQ-2/PHQ-9): PHQ-2 SCORE: 0  , done 2/13/2024        5 minutes spent screening and counseling for depression    Advanced Directives:   She does NOT have a Living Will. [Do you have a living will?: No]  She does NOT have a Power of  for Health Care. [Do you have a healthcare power of ?: No]  Not discussed      Patient Active Problem List   Diagnosis    Primary open angle glaucoma (POAG) of both eyes, severe stage    Essential hypertension    Chronic upper back pain    Hyperlipidemia    Dry eye syndrome of both eyes    Class 1 obesity due to excess calories with serious comorbidity and body mass index (BMI) of 32.0 to 32.9 in adult    Gastroesophageal reflux disease with esophagitis    Chronic right-sided headaches    External hemorrhoids    Sigmoid diverticulosis    Redundant colon    Melanosis coli    Hiatal hernia    Tinnitus of both ears    Sensorineural hearing loss (SNHL) of both ears    Cataract of both eyes    Right optic nerve atrophy    Tendinopathy of  rotator cuff    Chronic right shoulder pain    Degeneration of intervertebral disc of cervical region with osteophyte of cervical vertebra    Foraminal stenosis of cervical region    Cervical facet syndrome    Trigger point of neck    NSAID induced gastritis    Acute vulvitis    Smokers' cough (HCC)     Allergies:  She is allergic to seasonal.    Current Medications:  Outpatient Medications Marked as Taking for the 2/13/24 encounter (Office Visit) with Kenya Plascencia MD   Medication Sig    Solifenacin Succinate (VESICARE) 5 MG Oral Tab Take 1 tablet (5 mg total) by mouth daily.    nebivolol 5 MG Oral Tab Take 1 tablet (5 mg total) by mouth daily.    gabapentin 100 MG Oral Cap Take 2 capsules (200 mg total) by mouth nightly.    montelukast 10 MG Oral Tab Take 1 tablet (10 mg total) by mouth nightly.    loratadine (CLARITIN) 10 MG Oral Tab Take 1 tablet (10 mg total) by mouth daily.    nystatin-triamcinolone 100,000-0.1 Units/g-% External Ointment Apply twice daily  Monday-Friday to affected areas of rash.    triamcinolone 0.1 % External Cream Apply topically 2 (two) times daily as needed.    clotrimazole (LOTRIMIN AF) 1 % External Cream Apply 1 each topically 2 (two) times daily.    lisinopril 20 MG Oral Tab Take 1 tablet (20 mg total) by mouth daily.    naproxen 500 MG Oral Tab Take 1 tablet (500 mg total) by mouth 2 (two) times daily with meals.    Timolol Maleate 0.5 % Ophthalmic Solution Apply to eye.    latanoprost 0.005 % Ophthalmic Solution Place 1 drop into both eyes nightly.       Medical History:  She  has a past medical history of Acute vulvitis (11/29/2022), Allergic conjunctivitis, Cataract, Cataract of both eyes (06/13/2019), Cataract of both eyes (06/13/2019), Chronic right shoulder pain (09/30/2021), Chronic right-sided headaches (09/27/2017), Chronic right-sided headaches (09/27/2017), Chronic upper back pain (04/11/2017), Dry eye syndrome, Dry eye syndrome of both eyes (06/13/2019), Dry eye syndrome  of both eyes (2019), Essential hypertension, External hemorrhoids (2010), External hemorrhoids (2010), Foraminal stenosis of cervical region (2021), Foraminal stenosis of cervical region (2021), Glaucoma, Hiatal hernia (2010), Hiatal hernia (2010), Melanosis coli (2010), NSAID induced gastritis (2021), NSAID induced gastritis (2021), NSAID induced gastritis (2021), Psychophysiological insomnia (2018), Redundant colon (2010), Redundant colon (2010), Redundant colon (2010), Shingles, Sigmoid diverticulosis (2010), Sigmoid diverticulosis (2010), Sigmoid diverticulosis (2010), Smokers' cough (HCC) (2023), Smokers' cough (HCC) (2023), Tendinopathy of rotator cuff (2021), Tinnitus of both ears (10/02/2018), Trigger point of neck (2021), Trigger point of neck (2021), Trigger point of neck (2021), and Trigger point of neck (2021).  Surgical History:  She  has a past surgical history that includes other surgical history (Left, 2013);  (); egd (2010); and colonoscopy (2010).   Family History:  Her family history includes Arthritis in her father; Cancer in her mother; Diabetes in her son; Musculo-skelatal Disorder in her son.  Social History:  She  reports that she has never smoked. She has never used smokeless tobacco. She reports that she does not drink alcohol and does not use drugs.    Tobacco:  She has never smoked tobacco.    CAGE Alcohol Screen:   CAGE screening score of 0 on 2024, showing low risk of alcohol abuse.      Patient Care Team:  Kenya Plascencia MD as PCP - General (Family Medicine)  Tarango, Domingo, Domingo Dickey MD Martinez, Marco L, MD (OBSTETRICS & GYNECOLOGY)  Selene Verdugo, SLP as Speech Language Pathologist (Speech-Language Pathologist)  Jaylin Lau, PT as Physical Therapist (Physical Therapy)  Kely,  Lynette PT (Physical Therapy)    Review of Systems  GENERAL: feels well otherwise  SKIN: denies any unusual skin lesions  EYES: denies blurred vision or double vision  HEENT: denies nasal congestion, sinus pain or ST  LUNGS: denies shortness of breath with exertion  CARDIOVASCULAR: denies chest pain on exertion  GI: denies abdominal pain, denies heartburn  : denies dysuria, vaginal discharge or itching, no complaint of urinary incontinence   MUSCULOSKELETAL: denies back pain  NEURO: denies headaches  PSYCHE: denies depression or anxiety  HEMATOLOGIC: denies hx of anemia  ENDOCRINE: denies thyroid history  ALL/ASTHMA: denies hx of allergy or asthma    Objective:   Physical Exam  General Appearance:  Alert, cooperative, no distress, appears stated age   Head:  Normocephalic, without obvious abnormality, atraumatic   Eyes:  PERRL, conjunctiva/corneas clear, EOM's intact both eyes   Ears:  Normal TM's and external ear canals, both ears   Nose: Nares normal, septum midline,mucosa normal, no drainage or sinus tenderness   Throat: Lips, mucosa, and tongue normal; teeth and gums normal   Neck: Supple, symmetrical, trachea midline, no adenopathy;  thyroid: not enlarged, symmetric, no tenderness/mass/nodules; no carotid bruit or JVD   Back:   Symmetric, no curvature, ROM normal, no CVA tenderness   Lungs:   Clear to auscultation bilaterally, respirations unlabored   Heart:  Regular rate and rhythm, S1 and S2 normal, no murmur, rub, or gallop   Abdomen:   Soft, non-tender, bowel sounds active all four quadrants,  no masses, no organomegaly   Pelvic: Deferred   Extremities: Extremities normal, atraumatic, no cyanosis or edema   Pulses: 2+ and symmetric   Skin: Skin color, texture, turgor normal, no rashes or lesions   Lymph nodes: Cervical, supraclavicular, and axillary nodes normal   Neurologic: Normal       /59   Pulse 63   Temp 97.4 °F (36.3 °C) (Temporal)   Ht 5' 2\" (1.575 m)   Wt 177 lb 9.6 oz (80.6 kg)   SpO2  99%   BMI 32.48 kg/m²  Estimated body mass index is 32.48 kg/m² as calculated from the following:    Height as of this encounter: 5' 2\" (1.575 m).    Weight as of this encounter: 177 lb 9.6 oz (80.6 kg).    Medicare Hearing Assessment:   Hearing Screening    Screening Method: Questionnaire  I have a problem hearing over the telephone: No I have trouble following the conversations when two or more people are talking at the same time: No   I have trouble understanding things on the TV: No I have to strain to understand conversations: No   I have to worry about missing the telephone ring or doorbell: No I have trouble hearing conversations in a noisy background such as a crowded room or restaurant: No   I get confused about where sounds come from: No I misunderstand some words in a sentence and need to ask people to repeat themselves: No   I especially have trouble understanding the speech of women and children: No I have trouble understanding the speaker in a large room such as at a meeting or place of Zoroastrianism: No   Many people I talk to seem to mumble (or don't speak clearly): No People get annoyed because I misunderstand what they say: No   I misunderstand what others are saying and make inappropriate responses: No I avoid social activities because I cannot hear well and fear I will reply improperly: No   Family members and friends have told me they think I may have hearing loss: No                   Assessment & Plan:   Jesi Ashford is a 78 year old female who presents for a Medicare Assessment.     1. Sensorineural hearing loss (SNHL) of both ears (Primary)  2. Right optic nerve atrophy  -     Ophthalmology Referral - In Network  3. Primary open angle glaucoma (POAG) of both eyes, severe stage  Overview:  With Optic Nerve Atrophy R > L  New onset tinnitus, R sided HA    Orders:  -     Ophthalmology Referral - In Network  4. Melanosis coli  5. Hyperlipidemia, unspecified hyperlipidemia type  -     Lipid  Panel; Future; Expected date: 02/13/2024  6. Gastroesophageal reflux disease with esophagitis without hemorrhage  7. Foraminal stenosis of cervical region  8. Essential hypertension  -     Comp Metabolic Panel (14)  -     Assay, Thyroid Stim Hormone  -     CBC With Differential With Platelet  9. Urinary urgency  10. Encounter for annual health examination  Other orders  -     Solifenacin Succinate; Take 1 tablet (5 mg total) by mouth daily.  Dispense: 90 tablet; Refill: 1  -     Nebivolol HCl; Take 1 tablet (5 mg total) by mouth daily.  Dispense: 90 tablet; Refill: 1    The patient indicates understanding of these issues and agrees to the plan.  Reinforced healthy diet, lifestyle, and exercise.      No follow-ups on file.     Kenya Plascencia MD, 2/13/2024     Supplementary Documentation:   General Health:  In the past six months, have you lost more than 10 pounds without trying?: 2 - No  Has your appetite been poor?: No  Type of Diet: Balanced  How does the patient maintain a good energy level?: Stretching  How would you describe your daily physical activity?: Light  How would you describe your current health state?: Fair  How do you maintain positive mental well-being?: Social Interaction  On a scale of 0 to 10, with 0 being no pain and 10 being severe pain, what is your pain level?: 5 - (Moderate)  In the past six months, have you experienced urine leakage?: 1-Yes  At any time do you feel concerned for the safety/well-being of yourself and/or your children, in your home or elsewhere?: No  Have you had any immunizations at another office such as Influenza, Hepatitis B, Tetanus, or Pneumococcal?: Yes       Jesi Ashford's SCREENING SCHEDULE   Tests on this list are recommended by your physician but may not be covered, or covered at this frequency, by your insurer.   Please check with your insurance carrier before scheduling to verify coverage.   PREVENTATIVE SERVICES FREQUENCY &  COVERAGE DETAILS LAST  COMPLETION DATE   Diabetes Screening    Fasting Blood Sugar /  Glucose    One screening every 12 months if never tested or if previously tested but not diagnosed with pre-diabetes   One screening every 6 months if diagnosed with pre-diabetes Lab Results   Component Value Date     (H) 06/01/2023        Cardiovascular Disease Screening    Lipid Panel  Cholesterol  Lipoprotein (HDL)  Triglycerides Covered every 5 years for all Medicare beneficiaries without apparent signs or symptoms of cardiovascular disease Lab Results   Component Value Date    CHOLEST 178 07/13/2022    HDL 57 07/13/2022     (H) 07/13/2022    TRIG 106 07/13/2022         Electrocardiogram (EKG)   Covered if needed at Welcome to Medicare, and non-screening if indicated for medical reasons 03/30/2020      Ultrasound Screening for Abdominal Aortic Aneurysm (AAA) Covered once in a lifetime for one of the following risk factors    Men who are 65-75 years old and have ever smoked    Anyone with a family history -     Colorectal Cancer Screening  Covered for ages 50-85; only need ONE of the following:    Colonoscopy   Covered every 10 years    Covered every 2 years if patient is at high risk or previous colonoscopy was abnormal -    No recommendations at this time    Flexible Sigmoidoscopy   Covered every 4 years -    Fecal Occult Blood Test Covered annually -   Bone Density Screening    Bone density screening    Covered every 2 years after age 65 if diagnosed with risk of osteoporosis or estrogen deficiency.    Covered yearly for long-term glucocorticoid medication use (Steroids) Last Dexa Scan:    XR DEXA BONE DENSITOMETRY (CPT=77080) 03/09/2022      No recommendations at this time   Pap and Pelvic    Pap   Covered every 2 years for women at normal risk; Annually if at high risk -  No recommendations at this time    Chlamydia Annually if high risk -  No recommendations at this time   Screening Mammogram    Mammogram     Recommend annually for  all female patients aged 40 and older    One baseline mammogram covered for patients aged 35-39 02/24/2022    No recommendations at this time    Immunizations    Influenza Covered once per flu season  Please get every year -  Influenza Vaccine(1) due on 10/01/2023    Pneumococcal Each vaccine ([id number] & [id number]) covered once after 65 Prevnar 13: 05/03/2016    [id number]: 07/15/2017     No recommendations at this time    Hepatitis B One screening covered for patients with certain risk factors   -  No recommendations at this time    Tetanus Toxoid Not covered by Medicare Part B unless medically necessary (cut with metal); may be covered with your pharmacy prescription benefits -    Tetanus, Diptheria and Pertusis TD and TDaP Not covered by Medicare Part B -  No recommendations at this time    Zoster Not covered by Medicare Part B; may be covered with your pharmacy  prescription benefits 03/17/2016  No recommendations at this time     Annual Monitoring of Persistent Medications (ACE/ARB, digoxin diuretics, anticonvulsants)    Potassium Annually Lab Results   Component Value Date    K 4.8 06/01/2023         Creatinine   Annually Lab Results   Component Value Date    CREATSERUM 0.82 06/01/2023         BUN Annually Lab Results   Component Value Date    BUN 22 (H) 06/01/2023       Drug Serum Conc Annually No results found for: \"DIGOXIN\", \"DIG\", \"VALP\"         Chronic Obstructive Pulmonary Disease (COPD)    Spirometry Annually Spirometry date:

## 2024-02-14 NOTE — PATIENT INSTRUCTIONS
Jesi Ashford's SCREENING SCHEDULE   Tests on this list are recommended by your physician but may not be covered, or covered at this frequency, by your insurer.   Please check with your insurance carrier before scheduling to verify coverage.   PREVENTATIVE SERVICES FREQUENCY &  COVERAGE DETAILS LAST COMPLETION DATE   Diabetes Screening    Fasting Blood Sugar /  Glucose    One screening every 12 months if never tested or if previously tested but not diagnosed with pre-diabetes   One screening every 6 months if diagnosed with pre-diabetes Lab Results   Component Value Date     (H) 06/01/2023        Cardiovascular Disease Screening    Lipid Panel  Cholesterol  Lipoprotein (HDL)  Triglycerides Covered every 5 years for all Medicare beneficiaries without apparent signs or symptoms of cardiovascular disease Lab Results   Component Value Date    CHOLEST 178 07/13/2022    HDL 57 07/13/2022     (H) 07/13/2022    TRIG 106 07/13/2022         Electrocardiogram (EKG)   Covered if needed at Welcome to Medicare, and non-screening if indicated for medical reasons 03/30/2020      Ultrasound Screening for Abdominal Aortic Aneurysm (AAA) Covered once in a lifetime for one of the following risk factors   • Men who are 65-75 years old and have ever smoked   • Anyone with a family history -     Colorectal Cancer Screening  Covered for ages 50-85; only need ONE of the following:    Colonoscopy   Covered every 10 years    Covered every 2 years if patient is at high risk or previous colonoscopy was abnormal -    No recommendations at this time    Flexible Sigmoidoscopy   Covered every 4 years -    Fecal Occult Blood Test Covered annually -   Bone Density Screening    Bone density screening    Covered every 2 years after age 65 if diagnosed with risk of osteoporosis or estrogen deficiency.    Covered yearly for long-term glucocorticoid medication use (Steroids) Last Dexa Scan:    XR DEXA BONE DENSITOMETRY (CPT=77080)  03/09/2022      No recommendations at this time   Pap and Pelvic    Pap   Covered every 2 years for women at normal risk; Annually if at high risk -  No recommendations at this time    Chlamydia Annually if high risk -  No recommendations at this time   Screening Mammogram    Mammogram     Recommend annually for all female patients aged 40 and older    One baseline mammogram covered for patients aged 35-39 02/24/2022    No recommendations at this time    Immunizations    Influenza Covered once per flu season  Please get every year -  Influenza Vaccine(1) due on 10/01/2023    Pneumococcal Each vaccine ([id number] & [id number]) covered once after 65 Prevnar 13: 05/03/2016    [id number]: 07/15/2017     No recommendations at this time    Hepatitis B One screening covered for patients with certain risk factors   -  No recommendations at this time    Tetanus Toxoid Not covered by Medicare Part B unless medically necessary (cut with metal); may be covered with your pharmacy prescription benefits -    Tetanus, Diptheria and Pertusis TD and TDaP Not covered by Medicare Part B -  No recommendations at this time    Zoster Not covered by Medicare Part B; may be covered with your pharmacy  prescription benefits 03/17/2016  No recommendations at this time     Annual Monitoring of Persistent Medications (ACE/ARB, digoxin diuretics, anticonvulsants)    Potassium Annually Lab Results   Component Value Date    K 4.8 06/01/2023         Creatinine   Annually Lab Results   Component Value Date    CREATSERUM 0.82 06/01/2023         BUN Annually Lab Results   Component Value Date    BUN 22 (H) 06/01/2023       Drug Serum Conc Annually No results found for: \"DIGOXIN\", \"DIG\", \"VALP\"         Chronic Obstructive Pulmonary Disease (COPD)    Spirometry Annually Spirometry date:

## 2024-02-23 ENCOUNTER — TELEPHONE (OUTPATIENT)
Dept: FAMILY MEDICINE CLINIC | Facility: CLINIC | Age: 79
End: 2024-02-23

## 2024-02-23 NOTE — TELEPHONE ENCOUNTER
Patient calling to scheduled ER follow up visit with Dr. Plascencia, stated went to ER for fractured arm and fractured ribs, needs referral for specialist for arm fracture. Scheduled soonest available appointment on 02/29/24, patient requesting sooner appointment, declined to schedule with another provider.

## 2024-02-26 NOTE — TELEPHONE ENCOUNTER
With [facility]  [facility]   ID # [id number]    Patient calling ( identified name and  )states had broken arm and needs ER f/u  Her arm is in a cast and itches   Advised to use blow dryer on cool  and send oar thru,DO  NOT put anything up her cast     Only wants to see Dr. Plascencia  declines offer of other providers   Need info to see Ortho       Future Appointments   Date Time Provider Department Center   2024 11:10 AM Kenya Plascencia MD [facility]   2024  2:30 PM [facility] RM1 (1.5T WIDE) [facility] [place]   3/7/2024  1:40 PM Behar, Alex, MD [facility]

## 2024-02-27 ENCOUNTER — OFFICE VISIT (OUTPATIENT)
Dept: INTERNAL MEDICINE CLINIC | Facility: CLINIC | Age: 79
End: 2024-02-27

## 2024-02-27 ENCOUNTER — HOSPITAL ENCOUNTER (OUTPATIENT)
Dept: MRI IMAGING | Age: 79
Discharge: HOME | End: 2024-02-27
Attending: PHYSICAL MEDICINE & REHABILITATION
Payer: MEDICARE

## 2024-02-27 ENCOUNTER — LAB ENCOUNTER (OUTPATIENT)
Dept: LAB | Age: 79
End: 2024-02-27
Attending: PHYSICAL MEDICINE & REHABILITATION
Payer: MEDICARE

## 2024-02-27 VITALS
HEIGHT: 62 IN | BODY MASS INDEX: 32.02 KG/M2 | DIASTOLIC BLOOD PRESSURE: 62 MMHG | HEART RATE: 60 BPM | SYSTOLIC BLOOD PRESSURE: 96 MMHG | WEIGHT: 174 LBS

## 2024-02-27 DIAGNOSIS — M51.37 DDD (DEGENERATIVE DISC DISEASE), LUMBOSACRAL: ICD-10-CM

## 2024-02-27 DIAGNOSIS — M54.16 LUMBAR RADICULOPATHY: ICD-10-CM

## 2024-02-27 DIAGNOSIS — R05.9 COUGH, UNSPECIFIED TYPE: ICD-10-CM

## 2024-02-27 DIAGNOSIS — M51.36 BULGE OF LUMBAR DISC WITHOUT MYELOPATHY: ICD-10-CM

## 2024-02-27 DIAGNOSIS — M47.816 LUMBAR SPONDYLOSIS: ICD-10-CM

## 2024-02-27 DIAGNOSIS — S52.021A CLOSED FRACTURE OF OLECRANON PROCESS OF RIGHT ULNA, INITIAL ENCOUNTER: Primary | ICD-10-CM

## 2024-02-27 DIAGNOSIS — M48.061 LUMBAR FORAMINAL STENOSIS: ICD-10-CM

## 2024-02-27 DIAGNOSIS — M54.59 MECHANICAL LOW BACK PAIN: ICD-10-CM

## 2024-02-27 DIAGNOSIS — E78.5 HYPERLIPIDEMIA, UNSPECIFIED HYPERLIPIDEMIA TYPE: ICD-10-CM

## 2024-02-27 DIAGNOSIS — M47.816 FACET SYNDROME, LUMBAR: ICD-10-CM

## 2024-02-27 DIAGNOSIS — S22.42XA CLOSED FRACTURE OF MULTIPLE RIBS OF LEFT SIDE, INITIAL ENCOUNTER: ICD-10-CM

## 2024-02-27 DIAGNOSIS — M79.10 MYALGIA: ICD-10-CM

## 2024-02-27 LAB
ALBUMIN SERPL-MCNC: 4.1 G/DL (ref 3.2–4.8)
ALBUMIN/GLOB SERPL: 1.5 {RATIO} (ref 1–2)
ALP LIVER SERPL-CCNC: 54 U/L
ALT SERPL-CCNC: 14 U/L
ANION GAP SERPL CALC-SCNC: 2 MMOL/L (ref 0–18)
AST SERPL-CCNC: 29 U/L (ref ?–34)
BASOPHILS # BLD AUTO: 0.02 X10(3) UL (ref 0–0.2)
BASOPHILS NFR BLD AUTO: 0.3 %
BILIRUB SERPL-MCNC: 0.8 MG/DL (ref 0.2–1.1)
BUN BLD-MCNC: 15 MG/DL (ref 9–23)
BUN/CREAT SERPL: 17.9 (ref 10–20)
CALCIUM BLD-MCNC: 9.4 MG/DL (ref 8.7–10.4)
CHLORIDE SERPL-SCNC: 110 MMOL/L (ref 98–112)
CHOLEST SERPL-MCNC: 159 MG/DL (ref ?–200)
CO2 SERPL-SCNC: 28 MMOL/L (ref 21–32)
CREAT BLD-MCNC: 0.84 MG/DL
DEPRECATED RDW RBC AUTO: 45.9 FL (ref 35.1–46.3)
EGFRCR SERPLBLD CKD-EPI 2021: 71 ML/MIN/1.73M2 (ref 60–?)
EOSINOPHIL # BLD AUTO: 0.22 X10(3) UL (ref 0–0.7)
EOSINOPHIL NFR BLD AUTO: 3.6 %
ERYTHROCYTE [DISTWIDTH] IN BLOOD BY AUTOMATED COUNT: 13.3 % (ref 11–15)
FASTING PATIENT LIPID ANSWER: YES
FASTING STATUS PATIENT QL REPORTED: YES
GLOBULIN PLAS-MCNC: 2.7 G/DL (ref 2.8–4.4)
GLUCOSE BLD-MCNC: 107 MG/DL (ref 70–99)
HCT VFR BLD AUTO: 38.9 %
HDLC SERPL-MCNC: 49 MG/DL (ref 40–59)
HGB BLD-MCNC: 12.7 G/DL
IMM GRANULOCYTES # BLD AUTO: 0.01 X10(3) UL (ref 0–1)
IMM GRANULOCYTES NFR BLD: 0.2 %
LDLC SERPL CALC-MCNC: 95 MG/DL (ref ?–100)
LYMPHOCYTES # BLD AUTO: 1.36 X10(3) UL (ref 1–4)
LYMPHOCYTES NFR BLD AUTO: 22.3 %
MCH RBC QN AUTO: 30.6 PG (ref 26–34)
MCHC RBC AUTO-ENTMCNC: 32.6 G/DL (ref 31–37)
MCV RBC AUTO: 93.7 FL
MONOCYTES # BLD AUTO: 0.59 X10(3) UL (ref 0.1–1)
MONOCYTES NFR BLD AUTO: 9.7 %
NEUTROPHILS # BLD AUTO: 3.91 X10 (3) UL (ref 1.5–7.7)
NEUTROPHILS # BLD AUTO: 3.91 X10(3) UL (ref 1.5–7.7)
NEUTROPHILS NFR BLD AUTO: 63.9 %
NONHDLC SERPL-MCNC: 110 MG/DL (ref ?–130)
OSMOLALITY SERPL CALC.SUM OF ELEC: 291 MOSM/KG (ref 275–295)
PLATELET # BLD AUTO: 224 10(3)UL (ref 150–450)
POTASSIUM SERPL-SCNC: 4.5 MMOL/L (ref 3.5–5.1)
PROT SERPL-MCNC: 6.8 G/DL (ref 5.7–8.2)
RBC # BLD AUTO: 4.15 X10(6)UL
SODIUM SERPL-SCNC: 140 MMOL/L (ref 136–145)
TRIGL SERPL-MCNC: 80 MG/DL (ref 30–149)
TSI SER-ACNC: 2.58 MIU/ML (ref 0.55–4.78)
VLDLC SERPL CALC-MCNC: 13 MG/DL (ref 0–30)
WBC # BLD AUTO: 6.1 X10(3) UL (ref 4–11)

## 2024-02-27 PROCEDURE — 3074F SYST BP LT 130 MM HG: CPT | Performed by: NURSE PRACTITIONER

## 2024-02-27 PROCEDURE — 1160F RVW MEDS BY RX/DR IN RCRD: CPT | Performed by: NURSE PRACTITIONER

## 2024-02-27 PROCEDURE — 36415 COLL VENOUS BLD VENIPUNCTURE: CPT | Performed by: FAMILY MEDICINE

## 2024-02-27 PROCEDURE — 99203 OFFICE O/P NEW LOW 30 MIN: CPT | Performed by: NURSE PRACTITIONER

## 2024-02-27 PROCEDURE — 3078F DIAST BP <80 MM HG: CPT | Performed by: NURSE PRACTITIONER

## 2024-02-27 PROCEDURE — 1125F AMNT PAIN NOTED PAIN PRSNT: CPT | Performed by: NURSE PRACTITIONER

## 2024-02-27 PROCEDURE — 84443 ASSAY THYROID STIM HORMONE: CPT | Performed by: FAMILY MEDICINE

## 2024-02-27 PROCEDURE — 3008F BODY MASS INDEX DOCD: CPT | Performed by: NURSE PRACTITIONER

## 2024-02-27 PROCEDURE — 72148 MRI LUMBAR SPINE W/O DYE: CPT | Performed by: PHYSICAL MEDICINE & REHABILITATION

## 2024-02-27 PROCEDURE — 1159F MED LIST DOCD IN RCRD: CPT | Performed by: NURSE PRACTITIONER

## 2024-02-27 PROCEDURE — 85025 COMPLETE CBC W/AUTO DIFF WBC: CPT | Performed by: FAMILY MEDICINE

## 2024-02-27 PROCEDURE — 80053 COMPREHEN METABOLIC PANEL: CPT | Performed by: FAMILY MEDICINE

## 2024-02-27 PROCEDURE — 80061 LIPID PANEL: CPT

## 2024-02-27 RX ORDER — HYDROCODONE BITARTRATE AND ACETAMINOPHEN 5; 325 MG/1; MG/1
TABLET ORAL
COMMUNITY
Start: 2024-02-23

## 2024-02-27 RX ORDER — BENZONATATE 200 MG/1
200 CAPSULE ORAL 3 TIMES DAILY PRN
Qty: 30 CAPSULE | Refills: 0 | Status: SHIPPED | OUTPATIENT
Start: 2024-02-27 | End: 2024-03-01 | Stop reason: ALTCHOICE

## 2024-02-27 NOTE — PROGRESS NOTES
Jesi Ashford is a 78 year old female.  HPI:   Patient is new to me.  She is here with her son.  She reports she is following up after her ER visit at [facility].  She fell on February 22, 2024, reports that she slipped and hit her right elbow.  She denied any head injury, loss of consciousness.  She heard a cracking sound over the left side of her chest.  She reports there was no chest pain or shortness of breath dizziness, paresthesia, vision changes.  She was diagnosed with a fracture to the right ulna and left ribs.  She is requesting referral to the orthopedic specialist.  She denies any new or worsening symptoms, paresthesias, swelling.  She has Norco that she is using as needed.  Patient also reports a recent cold, started about a week ago and is significantly improved.  She reports she still has an intermittent mild cough that is productive but clears.  Denies any fatigue, chest pain, shortness of breath, fever or chills.  Denies any sinus pressure or pain.  Current Outpatient Medications   Medication Sig Dispense Refill    HYDROcodone-acetaminophen 5-325 MG Oral Tab TOME GEMINI TABLETA POR LA BOCA CADA SEIS HORAS SUZETTE SEA NECESSARIO POR DOLOR      benzonatate 200 MG Oral Cap Take 1 capsule (200 mg total) by mouth 3 (three) times daily as needed for cough. 30 capsule 0    Solifenacin Succinate (VESICARE) 5 MG Oral Tab Take 1 tablet (5 mg total) by mouth daily. 90 tablet 1    nebivolol 5 MG Oral Tab Take 1 tablet (5 mg total) by mouth daily. 90 tablet 1    gabapentin 100 MG Oral Cap Take 2 capsules (200 mg total) by mouth nightly.      montelukast 10 MG Oral Tab Take 1 tablet (10 mg total) by mouth nightly. 90 tablet 1    loratadine (CLARITIN) 10 MG Oral Tab Take 1 tablet (10 mg total) by mouth daily. 90 tablet 1    nystatin-triamcinolone 100,000-0.1 Units/g-% External Ointment Apply twice daily  Monday-Friday to affected areas of rash. 60 g 1    triamcinolone 0.1 % External Cream Apply topically  2 (two) times daily as needed. 60 g 3    clotrimazole (LOTRIMIN AF) 1 % External Cream Apply 1 each topically 2 (two) times daily. 60 g 1    lisinopril 20 MG Oral Tab Take 1 tablet (20 mg total) by mouth daily. 90 tablet 3    naproxen 500 MG Oral Tab Take 1 tablet (500 mg total) by mouth 2 (two) times daily with meals. 120 tablet 0    Timolol Maleate 0.5 % Ophthalmic Solution Apply to eye.  0    latanoprost 0.005 % Ophthalmic Solution Place 1 drop into both eyes nightly. 3 Bottle 2      Past Medical History:   Diagnosis Date    Acute vulvitis 11/29/2022    Allergic conjunctivitis     Cataract     Cataract of both eyes 06/13/2019    Cataract of both eyes 06/13/2019    Chronic right shoulder pain 09/30/2021    Chronic right-sided headaches 09/27/2017    Chronic right-sided headaches 09/27/2017    Chronic upper back pain 04/11/2017    Dry eye syndrome     Dry eye syndrome of both eyes 06/13/2019    Dry eye syndrome of both eyes 06/13/2019    Essential hypertension     External hemorrhoids 12/11/2010    External hemorrhoids 12/11/2010    Foraminal stenosis of cervical region 09/30/2021    Foraminal stenosis of cervical region 09/30/2021    Glaucoma     Hiatal hernia 12/11/2010    Hiatal hernia 12/11/2010    Melanosis coli 12/11/2010    NSAID induced gastritis 09/30/2021    NSAID induced gastritis 09/30/2021    NSAID induced gastritis 09/30/2021    Psychophysiological insomnia 02/14/2018    Redundant colon 12/11/2010    Redundant colon 12/11/2010    Redundant colon 12/11/2010    Shingles     Sigmoid diverticulosis 12/11/2010    Sigmoid diverticulosis 12/11/2010    Sigmoid diverticulosis 12/11/2010    Smokers' cough (HCC) 04/04/2023    Smokers' cough (HCC) 04/04/2023    Tendinopathy of rotator cuff 09/30/2021    Tinnitus of both ears 10/02/2018    Trigger point of neck 09/30/2021    Trigger point of neck 09/30/2021    Trigger point of neck 09/30/2021    Trigger point of neck 09/30/2021      Social History:  Social History      Socioeconomic History    Marital status:    Tobacco Use    Smoking status: Never    Smokeless tobacco: Never   Vaping Use    Vaping Use: Never used   Substance and Sexual Activity    Alcohol use: No    Drug use: No   Other Topics Concern    Pt has a pacemaker No    Pt has a defibrillator No    Reaction to local anesthetic No    Caffeine Concern No    Exercise No    Seat Belt Yes    Special Diet No    Stress Concern No    Weight Concern Yes   Social History Narrative    The patient does not use an assistive device..      The patient does live in a home with stairs.        REVIEW OF SYSTEMS:   Review of Systems   All other systems reviewed and are negative.         EXAM:   BP 96/62 (BP Location: Left arm, Patient Position: Sitting, Cuff Size: adult)   Pulse 60   Ht 5' 2\" (1.575 m)   Wt 174 lb (78.9 kg)   BMI 31.83 kg/m²     Physical Exam  Vitals reviewed.   Constitutional:       General: She is not in acute distress.  HENT:      Head: Normocephalic.   Eyes:      Conjunctiva/sclera: Conjunctivae normal.      Pupils: Pupils are equal, round, and reactive to light.   Cardiovascular:      Rate and Rhythm: Normal rate and regular rhythm.      Pulses: Normal pulses.      Heart sounds: Normal heart sounds.   Pulmonary:      Effort: Pulmonary effort is normal. No respiratory distress.      Breath sounds: No stridor. No wheezing, rhonchi or rales.   Chest:      Chest wall: No tenderness.   Musculoskeletal:      Left forearm: No swelling.   Skin:     General: Skin is warm.      Coloration: Skin is not jaundiced.   Neurological:      Mental Status: She is alert and oriented to person, place, and time.   Psychiatric:         Mood and Affect: Mood normal.         Judgment: Judgment normal.            ASSESSMENT AND PLAN:   1. Closed fracture of olecranon process of right ulna, initial encounter  -reviewed concerning s/s. Referral to ortho.   - Ortho Referral - In Network    2. Closed fracture of multiple ribs of  left side, initial encounter  -reviewed concerning s/s. Referral to ortho.   - Ortho Referral - In Network    3. Cough, unspecified type  -Physical exam unremarkable. Start benzonatate capsules. Nasal saline PRN. Reviewed concerning s/s.   - benzonatate 200 MG Oral Cap; Take 1 capsule (200 mg total) by mouth 3 (three) times daily as needed for cough.  Dispense: 30 capsule; Refill: 0       The patient indicates understanding of these issues and agrees to the plan.  The patient is asked to return in PRN.     The above note was creating using Dragon speech recognition technology. Please excuse any typos.

## 2024-02-28 ENCOUNTER — TELEPHONE (OUTPATIENT)
Dept: ORTHOPEDICS CLINIC | Facility: CLINIC | Age: 79
End: 2024-02-28

## 2024-02-28 ENCOUNTER — OFFICE VISIT (OUTPATIENT)
Dept: ORTHOPEDICS CLINIC | Facility: CLINIC | Age: 79
End: 2024-02-28
Payer: MEDICARE

## 2024-02-28 ENCOUNTER — HOSPITAL ENCOUNTER (OUTPATIENT)
Dept: GENERAL RADIOLOGY | Age: 79
Discharge: HOME | End: 2024-02-28
Attending: ORTHOPAEDIC SURGERY
Payer: MEDICARE

## 2024-02-28 VITALS — BODY MASS INDEX: 32.02 KG/M2 | HEIGHT: 62 IN | WEIGHT: 174 LBS

## 2024-02-28 DIAGNOSIS — S42.401A CLOSED FRACTURE OF RIGHT ELBOW, INITIAL ENCOUNTER: ICD-10-CM

## 2024-02-28 DIAGNOSIS — S52.021A CLOSED DISPLACED FRACTURE OF OLECRANON PROCESS OF RIGHT ULNA WITHOUT INTRA-ARTICULAR EXTENSION, INITIAL ENCOUNTER: Primary | ICD-10-CM

## 2024-02-28 DIAGNOSIS — S52.031A CLOSED DISPLACED INTRA-ARTICULAR FRACTURE OF OLECRANON PROCESS OF RIGHT ULNA, INITIAL ENCOUNTER: Primary | ICD-10-CM

## 2024-02-28 DIAGNOSIS — S22.42XA MULTIPLE FRACTURES OF RIBS, LEFT SIDE, INITIAL ENCOUNTER FOR CLOSED FRACTURE: ICD-10-CM

## 2024-02-28 DIAGNOSIS — S42.401A CLOSED FRACTURE OF RIGHT ELBOW, INITIAL ENCOUNTER: Primary | ICD-10-CM

## 2024-02-28 PROCEDURE — 1159F MED LIST DOCD IN RCRD: CPT | Performed by: ORTHOPAEDIC SURGERY

## 2024-02-28 PROCEDURE — 1160F RVW MEDS BY RX/DR IN RCRD: CPT | Performed by: ORTHOPAEDIC SURGERY

## 2024-02-28 PROCEDURE — 99205 OFFICE O/P NEW HI 60 MIN: CPT | Performed by: ORTHOPAEDIC SURGERY

## 2024-02-28 PROCEDURE — 73080 X-RAY EXAM OF ELBOW: CPT | Performed by: ORTHOPAEDIC SURGERY

## 2024-02-28 PROCEDURE — 3008F BODY MASS INDEX DOCD: CPT | Performed by: ORTHOPAEDIC SURGERY

## 2024-02-28 PROCEDURE — 1125F AMNT PAIN NOTED PAIN PRSNT: CPT | Performed by: ORTHOPAEDIC SURGERY
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
## 2024-02-28 NOTE — TELEPHONE ENCOUNTER
SURGERY SCHEDULING SHEET    Jesi Ashford  7/17/1945  [id number]    Procedure: Right elbow open reduction internal fixation olecranon fracture     Diagnosis:  Closed displaced fracture of olecranon process of right ulna without intra-articular extension, initial encounter [S52.021A]    Anesthesia: General    Length of Surgery: 2 hrs    Disposition: Outpatient    Special Equipment: Synthes 2 hole olecranon variable ankle plate, Kwires, 18 gauge wire    Positioning:    Assist: Yes LUIS IYER    Pre-op Testing: PER ANESTHESIA GUIDELINES    Clearance: HISTORY AND PHYSICAL     Post op: 2 weeks post op    Monica Piña MD  [facility]  Phone: [phone]  Fax: [phone]

## 2024-02-28 NOTE — TELEPHONE ENCOUNTER
XR ordered per ortho protocol. XR scheduled and patient was notified via voicemail to let them know that they should arrive 15-20 minutes early, in order for them to complete imaging.

## 2024-02-29 ENCOUNTER — TELEPHONE (OUTPATIENT)
Dept: FAMILY MEDICINE CLINIC | Facility: CLINIC | Age: 79
End: 2024-02-29

## 2024-02-29 ENCOUNTER — TELEPHONE (OUTPATIENT)
Dept: ORTHOPEDICS CLINIC | Facility: CLINIC | Age: 79
End: 2024-02-29

## 2024-02-29 DIAGNOSIS — S52.021A CLOSED DISPLACED FRACTURE OF OLECRANON PROCESS OF RIGHT ULNA WITHOUT INTRA-ARTICULAR EXTENSION, INITIAL ENCOUNTER: Primary | ICD-10-CM

## 2024-02-29 PROBLEM — Z01.818 PREOPERATIVE EXAMINATION, UNSPECIFIED: Status: ACTIVE | Noted: 2024-02-29

## 2024-02-29 NOTE — TELEPHONE ENCOUNTER
Patient's son called to request a Pre-op appointment. Patient broke her arm and will be having surgery on Tuesday 3/5. Next available is 4/17. Patients requesting a call back ASAP.

## 2024-02-29 NOTE — PROGRESS NOTES
Jesi Ashford is a 78 year old female who presents for a pre-operative physical exam.   Jesi Ashford is scheduled for a Right elbow open reduction internal fixation olecranon fracture  procedure at [facility] on 3/5/24 performed by Dr Monica Piña. Indication: Closed displaced fracture of olecranon process of right ulna without intra-articular extension     HPI related to surgery:   She fell on February 22, 2024, reports that she slipped and hit her right elbow. She denied any head injury, loss of consciousness. She heard a cracking sound over the left side of her chest.  X-rays were obtained and revealed: Acute distracted fracture of the right olecranon. no acute fracture or dislocation of the right humerus. Also with acute mildly displaced fractures of left lateral fifth and sixth ribs.   No pneumothorax.     She  has had previous anesthesia:  Yes.  Previous complications:  No    Social History     Socioeconomic History    Marital status:    Tobacco Use    Smoking status: Never    Smokeless tobacco: Never   Vaping Use    Vaping Use: Never used   Substance and Sexual Activity    Alcohol use: No    Drug use: No   Other Topics Concern    Pt has a pacemaker No    Pt has a defibrillator No    Reaction to local anesthetic No    Caffeine Concern No    Exercise No    Seat Belt Yes    Special Diet No    Stress Concern No    Weight Concern Yes   Social History Narrative    The patient does not use an assistive device..      The patient does live in a home with stairs.      Past Medical History:   Diagnosis Date    Acute vulvitis 11/29/2022    Allergic conjunctivitis     Cataract     Cataract of both eyes 06/13/2019    Cataract of both eyes 06/13/2019    Chronic right shoulder pain 09/30/2021    Chronic right-sided headaches 09/27/2017    Chronic right-sided headaches 09/27/2017    Chronic upper back pain 04/11/2017    Dry eye syndrome     Dry eye syndrome of both eyes 06/13/2019    Dry eye  syndrome of both eyes 2019    Essential hypertension     External hemorrhoids 2010    External hemorrhoids 2010    Foraminal stenosis of cervical region 2021    Foraminal stenosis of cervical region 2021    Glaucoma     Hiatal hernia 2010    Hiatal hernia 2010    Melanosis coli 2010    NSAID induced gastritis 2021    NSAID induced gastritis 2021    NSAID induced gastritis 2021    Psychophysiological insomnia 2018    Redundant colon 2010    Redundant colon 2010    Redundant colon 2010    Shingles     Sigmoid diverticulosis 2010    Sigmoid diverticulosis 2010    Sigmoid diverticulosis 2010    Smokers' cough (HCC) 2023    Smokers' cough (HCC) 2023    Tendinopathy of rotator cuff 2021    Tinnitus of both ears 10/02/2018    Trigger point of neck 2021    Trigger point of neck 2021    Trigger point of neck 2021    Trigger point of neck 2021     Past Surgical History:   Procedure Laterality Date          COLONOSCOPY  2010    external hemorrhoids, redundant/tortuous colon, sigmoid diverticulosis, and melanosis coli    EGD  2010    hiatal hernia and gastritis    OTHER SURGICAL HISTORY Left 2013    L forearm ORIF     Allergies:   Allergies   Allergen Reactions    Seasonal OTHER (SEE COMMENTS)     Runny eyes     Current Outpatient Medications   Medication Sig Dispense Refill    HYDROcodone-acetaminophen 5-325 MG Oral Tab TOME GEMINI TABLETA POR LA BOCA CADA SEIS HORAS SUZETTE SEA NECESSARIO POR DOLOR      benzonatate 200 MG Oral Cap Take 1 capsule (200 mg total) by mouth 3 (three) times daily as needed for cough. 30 capsule 0    Solifenacin Succinate (VESICARE) 5 MG Oral Tab Take 1 tablet (5 mg total) by mouth daily. 90 tablet 1    nebivolol 5 MG Oral Tab Take 1 tablet (5 mg total) by mouth daily. 90 tablet 1    gabapentin 100 MG Oral Cap Take 2 capsules (200  mg total) by mouth nightly.      montelukast 10 MG Oral Tab Take 1 tablet (10 mg total) by mouth nightly. 90 tablet 1    loratadine (CLARITIN) 10 MG Oral Tab Take 1 tablet (10 mg total) by mouth daily. 90 tablet 1    nystatin-triamcinolone 100,000-0.1 Units/g-% External Ointment Apply twice daily  Monday-Friday to affected areas of rash. 60 g 1    triamcinolone 0.1 % External Cream Apply topically 2 (two) times daily as needed. 60 g 3    clotrimazole (LOTRIMIN AF) 1 % External Cream Apply 1 each topically 2 (two) times daily. 60 g 1    lisinopril 20 MG Oral Tab Take 1 tablet (20 mg total) by mouth daily. 90 tablet 3    Timolol Maleate 0.5 % Ophthalmic Solution Apply to eye.  0    latanoprost 0.005 % Ophthalmic Solution Place 1 drop into both eyes nightly. 3 Bottle 2    naproxen 500 MG Oral Tab Take 1 tablet (500 mg total) by mouth 2 (two) times daily with meals. (Patient not taking: Reported on 2/28/2024) 120 tablet 0        REVIEW OF SYSTEMS:   Negative, except per HPI.     PHYSICAL EXAM:   /59   Pulse 64   Ht 5' 2\" (1.575 m)   Wt 176 lb (79.8 kg)   BMI 32.19 kg/m²    General appearance: alert  HEENT: Normocephalic, without obvious abnormality, atraumatic. PERRL, EOMI, pink conjunctiva.   Neck: supple, symmetrical, trachea midline, no adenopathy, no JVD and thyroid not enlarged, symmetric, no tenderness/mass/nodules  Lungs: respirations unlabored, clear to auscultation bilaterally  Heart: regular rate and rhythm, S1, S2 normal, no murmur  Abdomen: normoactive bowel sounds x4; soft, non-distended; nontender; no masses  Extremities: extremities normal, atraumatic, no cyanosis or edema; no erythema or tenderness in calves bilaterally. Ace wrap post mold to RUE. Good cms, does have some swelling and bruising to third and 4th finger.   Pulses: 2+ and symmetric  Neurologic: alert, oriented x3; CN II-XII intact; no focal deficits    LABORATORY DATA:   Last set of labs printed for reference.    ASSESSMENT AND  PLAN:   Jesi Ashford has history of HTN and HLD no significant history of cardiac or pulmonary conditions.   She is a good surgical candidate and is medically cleared for surgery.  This consult was sent back the referring physician, Dr. Piña.    Assessment:  1. Preoperative examination, unspecified  Patient is medically cleared for surgery pending labs.  No paperwork brought in for patient for clearance.  Reached out to surgeon. Only needs H&P and Medical Clearance.  Patient has both.    2. Closed displaced fracture of olecranon process of right ulna without intra-articular extension with nonunion, subsequent encounter  Patient to follow up with MD Piña  on 3/5/24 for ORIF.  Maintain ace wrap post mold.  Use sling as directed.  Do not be afraid to take norco     Patient aware of plan of care. All questions answered to satisfaction of the patient. Patient instructed to call office or reach out via Zendesk if any issues arise. For urgent issues and/or reviewed red flags please proceed to the urgent care or ER.  Also, inform the nurse practitioner with any new symptoms or medication side effects.     3. Hyperlipidemia, unspecified hyperlipidemia type  Stable  CPM    4. Essential hypertension  Stable  CPM      Plan   All labs reviewed. EKG WNL. Patient cleared to proceed for surgery via LUCILLE Blackburn and collaborating physician Boby.        LUCILLE Hoover

## 2024-02-29 NOTE — TELEPHONE ENCOUNTER
Date of Surgery: 3/5/24       Post Op Appt:  3/20/24 @     Case ID: [id number]    Notes:       SURGERY SCHEDULING SHEET     Jesi Ashford  7/17/1945  [id number]     Procedure: Right elbow open reduction internal fixation olecranon fracture      Diagnosis:  Closed displaced fracture of olecranon process of right ulna without intra-articular extension, initial encounter [S52.021A]     Anesthesia: General     Length of Surgery: 2 hrs     Disposition: Outpatient     Special Equipment: Synthes 2 hole olecranon variable ankle plate, Kwires, 18 gauge wire     Positioning:     Assist: Yes LUIS IYER     Pre-op Testing: PER ANESTHESIA GUIDELINES     Clearance: HISTORY AND PHYSICAL      Post op: 2 weeks post op     Monica Piña MD  [facility] Orthopedic Surgery  Phone: [phone]  Fax: [phone]

## 2024-02-29 NOTE — TELEPHONE ENCOUNTER
FYI: Dr Plascencia is fully booked, where would you like us to put the patient with her? All of the provider in [facility] is booked.  Please advise, Thank you.    Please reply to pool: EM CC IM FM ALG RHE

## 2024-02-29 NOTE — H&P (VIEW-ONLY)
Jesi Ashford is a 78 year old female who presents for a pre-operative physical exam.   Jesi Ashford is scheduled for a Right elbow open reduction internal fixation olecranon fracture  procedure at [facility] on 3/5/24 performed by Dr Monica Piña. Indication: Closed displaced fracture of olecranon process of right ulna without intra-articular extension     HPI related to surgery:   She fell on February 22, 2024, reports that she slipped and hit her right elbow. She denied any head injury, loss of consciousness. She heard a cracking sound over the left side of her chest.  X-rays were obtained and revealed: Acute distracted fracture of the right olecranon. no acute fracture or dislocation of the right humerus. Also with acute mildly displaced fractures of left lateral fifth and sixth ribs.   No pneumothorax.     She  has had previous anesthesia:  Yes.  Previous complications:  No    Social History     Socioeconomic History    Marital status:    Tobacco Use    Smoking status: Never    Smokeless tobacco: Never   Vaping Use    Vaping Use: Never used   Substance and Sexual Activity    Alcohol use: No    Drug use: No   Other Topics Concern    Pt has a pacemaker No    Pt has a defibrillator No    Reaction to local anesthetic No    Caffeine Concern No    Exercise No    Seat Belt Yes    Special Diet No    Stress Concern No    Weight Concern Yes   Social History Narrative    The patient does not use an assistive device..      The patient does live in a home with stairs.      Past Medical History:   Diagnosis Date    Acute vulvitis 11/29/2022    Allergic conjunctivitis     Cataract     Cataract of both eyes 06/13/2019    Cataract of both eyes 06/13/2019    Chronic right shoulder pain 09/30/2021    Chronic right-sided headaches 09/27/2017    Chronic right-sided headaches 09/27/2017    Chronic upper back pain 04/11/2017    Dry eye syndrome     Dry eye syndrome of both eyes 06/13/2019    Dry eye  syndrome of both eyes 2019    Essential hypertension     External hemorrhoids 2010    External hemorrhoids 2010    Foraminal stenosis of cervical region 2021    Foraminal stenosis of cervical region 2021    Glaucoma     Hiatal hernia 2010    Hiatal hernia 2010    Melanosis coli 2010    NSAID induced gastritis 2021    NSAID induced gastritis 2021    NSAID induced gastritis 2021    Psychophysiological insomnia 2018    Redundant colon 2010    Redundant colon 2010    Redundant colon 2010    Shingles     Sigmoid diverticulosis 2010    Sigmoid diverticulosis 2010    Sigmoid diverticulosis 2010    Smokers' cough (HCC) 2023    Smokers' cough (HCC) 2023    Tendinopathy of rotator cuff 2021    Tinnitus of both ears 10/02/2018    Trigger point of neck 2021    Trigger point of neck 2021    Trigger point of neck 2021    Trigger point of neck 2021     Past Surgical History:   Procedure Laterality Date          COLONOSCOPY  2010    external hemorrhoids, redundant/tortuous colon, sigmoid diverticulosis, and melanosis coli    EGD  2010    hiatal hernia and gastritis    OTHER SURGICAL HISTORY Left 2013    L forearm ORIF     Allergies:   Allergies   Allergen Reactions    Seasonal OTHER (SEE COMMENTS)     Runny eyes     Current Outpatient Medications   Medication Sig Dispense Refill    HYDROcodone-acetaminophen 5-325 MG Oral Tab TOME GEMINI TABLETA POR LA BOCA CADA SEIS HORAS SUZETTE SEA NECESSARIO POR DOLOR      benzonatate 200 MG Oral Cap Take 1 capsule (200 mg total) by mouth 3 (three) times daily as needed for cough. 30 capsule 0    Solifenacin Succinate (VESICARE) 5 MG Oral Tab Take 1 tablet (5 mg total) by mouth daily. 90 tablet 1    nebivolol 5 MG Oral Tab Take 1 tablet (5 mg total) by mouth daily. 90 tablet 1    gabapentin 100 MG Oral Cap Take 2 capsules (200  mg total) by mouth nightly.      montelukast 10 MG Oral Tab Take 1 tablet (10 mg total) by mouth nightly. 90 tablet 1    loratadine (CLARITIN) 10 MG Oral Tab Take 1 tablet (10 mg total) by mouth daily. 90 tablet 1    nystatin-triamcinolone 100,000-0.1 Units/g-% External Ointment Apply twice daily  Monday-Friday to affected areas of rash. 60 g 1    triamcinolone 0.1 % External Cream Apply topically 2 (two) times daily as needed. 60 g 3    clotrimazole (LOTRIMIN AF) 1 % External Cream Apply 1 each topically 2 (two) times daily. 60 g 1    lisinopril 20 MG Oral Tab Take 1 tablet (20 mg total) by mouth daily. 90 tablet 3    Timolol Maleate 0.5 % Ophthalmic Solution Apply to eye.  0    latanoprost 0.005 % Ophthalmic Solution Place 1 drop into both eyes nightly. 3 Bottle 2    naproxen 500 MG Oral Tab Take 1 tablet (500 mg total) by mouth 2 (two) times daily with meals. (Patient not taking: Reported on 2/28/2024) 120 tablet 0        REVIEW OF SYSTEMS:   Negative, except per HPI.     PHYSICAL EXAM:   /59   Pulse 64   Ht 5' 2\" (1.575 m)   Wt 176 lb (79.8 kg)   BMI 32.19 kg/m²    General appearance: alert  HEENT: Normocephalic, without obvious abnormality, atraumatic. PERRL, EOMI, pink conjunctiva.   Neck: supple, symmetrical, trachea midline, no adenopathy, no JVD and thyroid not enlarged, symmetric, no tenderness/mass/nodules  Lungs: respirations unlabored, clear to auscultation bilaterally  Heart: regular rate and rhythm, S1, S2 normal, no murmur  Abdomen: normoactive bowel sounds x4; soft, non-distended; nontender; no masses  Extremities: extremities normal, atraumatic, no cyanosis or edema; no erythema or tenderness in calves bilaterally. Ace wrap post mold to RUE. Good cms, does have some swelling and bruising to third and 4th finger.   Pulses: 2+ and symmetric  Neurologic: alert, oriented x3; CN II-XII intact; no focal deficits    LABORATORY DATA:   Last set of labs printed for reference.    ASSESSMENT AND  PLAN:   Jesi Ashford has history of HTN and HLD no significant history of cardiac or pulmonary conditions.   She is a good surgical candidate and is medically cleared for surgery.  This consult was sent back the referring physician, Dr. Piña.    Assessment:  1. Preoperative examination, unspecified  Patient is medically cleared for surgery pending labs.  No paperwork brought in for patient for clearance.  Reached out to surgeon. Only needs H&P and Medical Clearance.  Patient has both.    2. Closed displaced fracture of olecranon process of right ulna without intra-articular extension with nonunion, subsequent encounter  Patient to follow up with MD Piña  on 3/5/24 for ORIF.  Maintain ace wrap post mold.  Use sling as directed.  Do not be afraid to take norco     Patient aware of plan of care. All questions answered to satisfaction of the patient. Patient instructed to call office or reach out via [employer] if any issues arise. For urgent issues and/or reviewed red flags please proceed to the urgent care or ER.  Also, inform the nurse practitioner with any new symptoms or medication side effects.     3. Hyperlipidemia, unspecified hyperlipidemia type  Stable  CPM    4. Essential hypertension  Stable  CPM      Plan   All labs reviewed. EKG WNL. Patient cleared to proceed for surgery via LUCILLE Blackburn and collaborating physician Boby.        LUCILLE Hoover

## 2024-02-29 NOTE — TELEPHONE ENCOUNTER
Called and left a message for Jesi using an  (Navid [id number]) in regards to her upcoming surgery to discuss the surgical protocol and to make the postop appt as well.     I also tried reaching her son Orville in regards to getting in touch with his mother Jesi.

## 2024-02-29 NOTE — TELEPHONE ENCOUNTER
Son called back and scheduled an appointment for tomorrow at 10:20am with Donaldo Blackburn in [place] for a preop exam. Surgery on right arm fracture scheduled for 3/5/2024.

## 2024-03-01 ENCOUNTER — TELEPHONE (OUTPATIENT)
Dept: FAMILY MEDICINE CLINIC | Facility: CLINIC | Age: 79
End: 2024-03-01

## 2024-03-01 ENCOUNTER — OFFICE VISIT (OUTPATIENT)
Dept: FAMILY MEDICINE CLINIC | Facility: CLINIC | Age: 79
End: 2024-03-01

## 2024-03-01 VITALS
SYSTOLIC BLOOD PRESSURE: 114 MMHG | DIASTOLIC BLOOD PRESSURE: 59 MMHG | WEIGHT: 176 LBS | HEART RATE: 64 BPM | HEIGHT: 62 IN | BODY MASS INDEX: 32.39 KG/M2

## 2024-03-01 DIAGNOSIS — S52.021K: ICD-10-CM

## 2024-03-01 DIAGNOSIS — Z01.818 PREOPERATIVE EXAMINATION, UNSPECIFIED: Primary | ICD-10-CM

## 2024-03-01 DIAGNOSIS — E78.5 HYPERLIPIDEMIA, UNSPECIFIED HYPERLIPIDEMIA TYPE: ICD-10-CM

## 2024-03-01 DIAGNOSIS — I10 ESSENTIAL HYPERTENSION: ICD-10-CM

## 2024-03-01 PROCEDURE — 3008F BODY MASS INDEX DOCD: CPT

## 2024-03-01 PROCEDURE — 3078F DIAST BP <80 MM HG: CPT

## 2024-03-01 PROCEDURE — 1160F RVW MEDS BY RX/DR IN RCRD: CPT

## 2024-03-01 PROCEDURE — 99213 OFFICE O/P EST LOW 20 MIN: CPT

## 2024-03-01 PROCEDURE — 3074F SYST BP LT 130 MM HG: CPT

## 2024-03-01 PROCEDURE — 1159F MED LIST DOCD IN RCRD: CPT

## 2024-03-01 RX ORDER — MULTIVITAMIN WITH IRON
50 TABLET ORAL DAILY
COMMUNITY
End: 2024-03-05

## 2024-03-01 RX ORDER — GUAIFENESIN/DEXTROMETHORPHAN 100-10MG/5
5 SYRUP ORAL 3 TIMES DAILY PRN
COMMUNITY
End: 2024-03-05

## 2024-03-01 RX ORDER — MULTIVIT-MIN/IRON/FOLIC ACID/K 18-600-40
CAPSULE ORAL
COMMUNITY
End: 2024-03-05

## 2024-03-01 RX ORDER — LORATADINE 10 MG/1
10 TABLET ORAL DAILY
Qty: 90 TABLET | Refills: 3 | Status: SHIPPED | OUTPATIENT
Start: 2024-03-01

## 2024-03-01 NOTE — ASSESSMENT & PLAN NOTE
Patient is medically cleared for surgery pending labs.  No paperwork brought in for patient for clearance.  Reached out to surgeon.  Will obtain PTT and EKG.

## 2024-03-01 NOTE — ASSESSMENT & PLAN NOTE
Patient to follow up with MD Piña  on 3/5/24 for ORIF.  Maintain ace wrap post mold.  Use sling as directed.  Do not be afraid to take norco    Patient aware of plan of care. All questions answered to satisfaction of the patient. Patient instructed to call office or reach out via [employer] if any issues arise. For urgent issues and/or reviewed red flags please proceed to the urgent care or ER.  Also, inform the nurse practitioner with any new symptoms or medication side effects.

## 2024-03-01 NOTE — TELEPHONE ENCOUNTER
Melida would like to have the patient's notes from his pre op appointment faxed over. Patient undergoing surgery next week.    Fax- [phone]

## 2024-03-01 NOTE — TELEPHONE ENCOUNTER
Patients son missed a call from Niharika. Orville will like a call back ASAP, his Mom has surgery 3/5 for an arm fracture. He wants to know if there are any additional testing that need to be completed.

## 2024-03-02 NOTE — TELEPHONE ENCOUNTER
Refill passed per [facility] protocol.     Requested Prescriptions   Pending Prescriptions Disp Refills    LORATADINE 10 MG Oral Tab [Pharmacy Med Name: LORATADINE 10 MG Tablet] 90 tablet 3     Sig: TOME 1 TABLETA CADA TONY       Allergy Medication Protocol Passed - 2/29/2024  1:16 PM        Passed - In person appointment or virtual visit in the past 12 mos or appointment in next 3 mos     Recent Outpatient Visits              Today Preoperative examination, unspecified    [facility], [facility], Donaldo Addison APRN    Office Visit    2 days ago Closed displaced intra-articular fracture of olecranon process of right ulna, initial encounter    [facility], [address] [place] [address], Monica Tipton MD    Office Visit    3 days ago Closed fracture of olecranon process of right ulna, initial encounter    [facility], [place], Kathryn E., APRN    Office Visit    2 weeks ago Sensorineural hearing loss (SNHL) of both ears    [facility] [place] [facility]    Office Visit    1 month ago Myalgia    [facility] Behar, Alex, MD    Office Visit          Future Appointments         Provider Department Appt Notes    In 6 days Behar, Alex, MD [facility] MRI F/U  Declined video                 MONTELUKAST 10 MG Oral Tab [Pharmacy Med Name: MONTELUKAST SODIUM 10 MG Tablet] 90 tablet 3     Sig: TOME 1 TABLETA CADA NOCHE       Asthma & COPD Medication Protocol Failed - 2/29/2024  1:16 PM        Failed - Asthma Action Score greater than or equal to 20        Failed - AAP/ACT given in last 12 months     No data recorded  No data recorded  No data recorded  No data recorded          Passed - Appointment in past 6 or next 3 months      Recent Outpatient Visits              Today Preoperative examination, unspecified    [place]  [facility], [facility], [place] Donaldo Blackburn, APRN    Office Visit    2 days ago Closed displaced intra-articular fracture of olecranon process of right ulna, initial encounter    [facility], [address] [place], [place] [address]Monica Christianson MD    Office Visit    3 days ago Closed fracture of olecranon process of right ulna, initial encounter    [facility], [address], [place], Shelley FLORES, APRN    Office Visit    2 weeks ago Sensorineural hearing loss (SNHL) of both ears    [facility], [facility], Kenya Kurtz MD    Office Visit    1 month ago [facility]    Office Visit          Future Appointments         Provider Department Appt Notes    In 6 days Behar, Alex, MD [facility], [place] MRI F/U  Declined video                     Future Appointments         Provider Department Appt Notes    In 6 days Behar, Alex, MD [facility], [place] MRI F/U  Declined video             Recent Outpatient Visits              Today Preoperative examination, unspecified    [facility], [place] Donadlo Blackburn, APRN    Office Visit    2 days ago Closed displaced intra-articular fracture of olecranon process of right ulna, initial encounter    [facility], [address] [place] [address], [place] MD    Office Visit    3 days ago Closed fracture of olecranon process of right ulna, initial encounter    [facility], [address], [place], Shelley FLORES, APRN    Office Visit    2 weeks ago Sensorineural hearing loss (SNHL) of both ears    [facility], [facility], Kenya Kurtz MD    Office Visit    [facility] month [facility]    [facility] [place] Behar, Alex, MD    Office Visit

## 2024-03-02 NOTE — TELEPHONE ENCOUNTER
Please review; protocol failed/no protocol.     Requested Prescriptions   Pending Prescriptions Disp Refills    montelukast 10 MG Oral Tab [Pharmacy Med Name: MONTELUKAST SODIUM 10 MG Tablet] 90 tablet 3     Sig: Take 1 tablet (10 mg total) by mouth nightly.       Asthma & COPD Medication Protocol Failed - 2/29/2024  1:16 PM        Failed - Asthma Action Score greater than or equal to 20        Failed - AAP/ACT given in last 12 months     No data recorded  No data recorded  No data recorded  No data recorded          Passed - Appointment in past 6 or next 3 months      Recent Outpatient Visits              Today Preoperative examination, unspecified    [facility], Donaldo Addison APRN    Office Visit    2 days ago Closed displaced intra-articular fracture of olecranon process of right ulna, initial encounter    [address], Monica Tipton MD    Office Visit    3 days ago Closed fracture of olecranon process of right ulna, initial encounter    [facility], [place], Kathryn E., APRN    Office Visit    2 weeks ago Sensorineural hearing loss (SNHL) of both ears    [facility], [place]    Office Visit    1 month ago Myalgia    [facility] Behar, Alex, MD    Office Visit          Future Appointments         Provider Department Appt Notes    In 6 days Behar, Alex, MD [facility] MRI F/U  Declined video                Signed Prescriptions Disp Refills    loratadine 10 MG Oral Tab 90 tablet 3     Sig: Take 1 tablet (10 mg total) by mouth daily.       Allergy Medication Protocol Passed - 2/29/2024  1:16 PM        Passed - In person appointment or virtual visit in the past 12 mos or appointment in next 3 mos     Recent Outpatient Visits              Today Preoperative examination,  unspecified    [facility], [facility], Donaldo Addison, APRN    Office Visit    2 days ago Closed displaced intra-articular fracture of olecranon process of right ulna, initial encounter    [facility], [address] [place] [address], [place]    Office Visit    3 days ago Closed fracture of olecranon process of right ulna, initial encounter    [facility], [address], [place], Shelley E., APRN    Office Visit    2 weeks ago Sensorineural hearing loss (SNHL) of both ears    [facility], [facility], Kenya Kurtz MD    Office Visit    1 month ago [facility] [place] [facility], Alex, MD    Office Visit          Future Appointments         Provider Department Appt Notes    In 6 days Behar, Alex, MD [facility], [place] MRI F/U  Declined video                     Recent Outpatient Visits              Today Preoperative examination, unspecified    [facility], [facility], Donaldo Addison, APRN    Office Visit    2 days ago Closed displaced intra-articular fracture of olecranon process of right ulna, initial encounter    [facility], [address] [place] [address], Monica Tipton MD    Office Visit    3 days ago Closed fracture of olecranon process of right ulna, initial encounter    [facility], [address], [place], Shelley E., APRN    Office Visit    2 weeks ago Sensorineural hearing loss (SNHL) of both ears    [facility], [place] Kurtz MD    Office Visit    1 month ago [facility]    [facility], [place] Behar, Alex, MD    Office Visit             Future Appointments         Provider Department Appt Notes    In 6 days Behar, Alex, MD [facility], [place] MRI F/U  Declined video

## 2024-03-04 RX ORDER — MONTELUKAST SODIUM 10 MG/1
10 TABLET ORAL NIGHTLY
Qty: 90 TABLET | Refills: 3 | Status: SHIPPED | OUTPATIENT
Start: 2024-03-04

## 2024-03-04 NOTE — TELEPHONE ENCOUNTER
Per OV notes, no additional testing is needed.      Contacted patient and informed no additional testing is needed at this time,also h&p will be faxed to surgeon's office, she verbalized understanding and had no questions at this time.    No further action needed at this time. Closing TE.    1. Preoperative examination, unspecified  Patient is medically cleared for surgery pending labs.  No paperwork brought in for patient for clearance.  Reached out to surgeon. Only needs H&P and Medical Clearance.  Patient has both.

## 2024-03-05 ENCOUNTER — HOSPITAL ENCOUNTER (OUTPATIENT)
Facility: HOSPITAL | Age: 79
Setting detail: HOSPITAL OUTPATIENT SURGERY
Discharge: HOME | End: 2024-03-05
Attending: ORTHOPAEDIC SURGERY | Admitting: ORTHOPAEDIC SURGERY
Payer: MEDICARE

## 2024-03-05 ENCOUNTER — ANESTHESIA EVENT (OUTPATIENT)
Dept: SURGERY | Facility: HOSPITAL | Age: 79
End: 2024-03-05
Payer: MEDICARE

## 2024-03-05 ENCOUNTER — ANESTHESIA (OUTPATIENT)
Dept: SURGERY | Facility: HOSPITAL | Age: 79
End: 2024-03-05
Payer: MEDICARE

## 2024-03-05 ENCOUNTER — APPOINTMENT (OUTPATIENT)
Dept: GENERAL RADIOLOGY | Facility: HOSPITAL | Age: 79
End: 2024-03-05
Attending: ORTHOPAEDIC SURGERY
Payer: MEDICARE

## 2024-03-05 VITALS
DIASTOLIC BLOOD PRESSURE: 81 MMHG | RESPIRATION RATE: 16 BRPM | HEIGHT: 65 IN | BODY MASS INDEX: 28.76 KG/M2 | HEART RATE: 80 BPM | OXYGEN SATURATION: 100 % | TEMPERATURE: 97 F | SYSTOLIC BLOOD PRESSURE: 140 MMHG | WEIGHT: 172.63 LBS

## 2024-03-05 DIAGNOSIS — S52.021A CLOSED DISPLACED FRACTURE OF OLECRANON PROCESS OF RIGHT ULNA WITHOUT INTRA-ARTICULAR EXTENSION, INITIAL ENCOUNTER: Primary | ICD-10-CM

## 2024-03-05 PROCEDURE — 0PSK04Z REPOSITION RIGHT ULNA WITH INTERNAL FIXATION DEVICE, OPEN APPROACH: ICD-10-PCS | Performed by: ORTHOPAEDIC SURGERY

## 2024-03-05 PROCEDURE — 76000 FLUOROSCOPY <1 HR PHYS/QHP: CPT | Performed by: ORTHOPAEDIC SURGERY

## 2024-03-05 PROCEDURE — 76942 ECHO GUIDE FOR BIOPSY: CPT | Performed by: ANESTHESIOLOGY

## 2024-03-05 DEVICE — IMPLANTABLE DEVICE: Type: IMPLANTABLE DEVICE | Site: ELBOW | Status: FUNCTIONAL

## 2024-03-05 RX ORDER — METOCLOPRAMIDE HYDROCHLORIDE 5 MG/ML
10 INJECTION INTRAMUSCULAR; INTRAVENOUS EVERY 8 HOURS PRN
Status: DISCONTINUED | OUTPATIENT
Start: 2024-03-05 | End: 2024-03-05

## 2024-03-05 RX ORDER — LIDOCAINE HYDROCHLORIDE 10 MG/ML
INJECTION, SOLUTION EPIDURAL; INFILTRATION; INTRACAUDAL; PERINEURAL AS NEEDED
Status: DISCONTINUED | OUTPATIENT
Start: 2024-03-05 | End: 2024-03-06 | Stop reason: SURG

## 2024-03-05 RX ORDER — SODIUM CHLORIDE, SODIUM LACTATE, POTASSIUM CHLORIDE, CALCIUM CHLORIDE 600; 310; 30; 20 MG/100ML; MG/100ML; MG/100ML; MG/100ML
INJECTION, SOLUTION INTRAVENOUS CONTINUOUS
Status: DISCONTINUED | OUTPATIENT
Start: 2024-03-05 | End: 2024-03-05

## 2024-03-05 RX ORDER — HYDROCODONE BITARTRATE AND ACETAMINOPHEN 5; 325 MG/1; MG/1
2 TABLET ORAL ONCE AS NEEDED
Status: DISCONTINUED | OUTPATIENT
Start: 2024-03-05 | End: 2024-03-05

## 2024-03-05 RX ORDER — HYDROMORPHONE HYDROCHLORIDE 1 MG/ML
0.2 INJECTION, SOLUTION INTRAMUSCULAR; INTRAVENOUS; SUBCUTANEOUS EVERY 5 MIN PRN
Status: DISCONTINUED | OUTPATIENT
Start: 2024-03-05 | End: 2024-03-05

## 2024-03-05 RX ORDER — HYDROCODONE BITARTRATE AND ACETAMINOPHEN 5; 325 MG/1; MG/1
1 TABLET ORAL
Qty: 30 TABLET | Refills: 0 | Status: SHIPPED | OUTPATIENT
Start: 2024-03-05

## 2024-03-05 RX ORDER — ACETAMINOPHEN 500 MG
1000 TABLET ORAL ONCE AS NEEDED
Status: DISCONTINUED | OUTPATIENT
Start: 2024-03-05 | End: 2024-03-05

## 2024-03-05 RX ORDER — ONDANSETRON 2 MG/ML
4 INJECTION INTRAMUSCULAR; INTRAVENOUS EVERY 6 HOURS PRN
Status: DISCONTINUED | OUTPATIENT
Start: 2024-03-05 | End: 2024-03-05

## 2024-03-05 RX ORDER — HYDROMORPHONE HYDROCHLORIDE 1 MG/ML
0.4 INJECTION, SOLUTION INTRAMUSCULAR; INTRAVENOUS; SUBCUTANEOUS EVERY 5 MIN PRN
Status: DISCONTINUED | OUTPATIENT
Start: 2024-03-05 | End: 2024-03-05

## 2024-03-05 RX ORDER — MIDAZOLAM HYDROCHLORIDE 1 MG/ML
1 INJECTION INTRAMUSCULAR; INTRAVENOUS EVERY 5 MIN PRN
Status: DISCONTINUED | OUTPATIENT
Start: 2024-03-05 | End: 2024-03-05

## 2024-03-05 RX ORDER — CEFAZOLIN SODIUM/WATER 2 G/20 ML
2 SYRINGE (ML) INTRAVENOUS ONCE
Status: COMPLETED | OUTPATIENT
Start: 2024-03-05 | End: 2024-03-05

## 2024-03-05 RX ORDER — ACETAMINOPHEN 500 MG
1000 TABLET ORAL ONCE
Status: DISCONTINUED | OUTPATIENT
Start: 2024-03-05 | End: 2024-03-05 | Stop reason: HOSPADM

## 2024-03-05 RX ORDER — DEXAMETHASONE SODIUM PHOSPHATE 10 MG/ML
INJECTION, SOLUTION INTRAMUSCULAR; INTRAVENOUS AS NEEDED
Status: DISCONTINUED | OUTPATIENT
Start: 2024-03-05 | End: 2024-03-06 | Stop reason: SURG

## 2024-03-05 RX ORDER — HYDROCODONE BITARTRATE AND ACETAMINOPHEN 5; 325 MG/1; MG/1
1 TABLET ORAL ONCE AS NEEDED
Status: DISCONTINUED | OUTPATIENT
Start: 2024-03-05 | End: 2024-03-05

## 2024-03-05 RX ORDER — NALOXONE HYDROCHLORIDE 0.4 MG/ML
0.08 INJECTION, SOLUTION INTRAMUSCULAR; INTRAVENOUS; SUBCUTANEOUS AS NEEDED
Status: DISCONTINUED | OUTPATIENT
Start: 2024-03-05 | End: 2024-03-05

## 2024-03-05 RX ORDER — MEPERIDINE HYDROCHLORIDE 25 MG/ML
25 INJECTION INTRAMUSCULAR; INTRAVENOUS; SUBCUTANEOUS
Status: DISCONTINUED | OUTPATIENT
Start: 2024-03-05 | End: 2024-03-05

## 2024-03-05 RX ORDER — HYDROMORPHONE HYDROCHLORIDE 1 MG/ML
INJECTION, SOLUTION INTRAMUSCULAR; INTRAVENOUS; SUBCUTANEOUS
Status: COMPLETED
Start: 2024-03-05 | End: 2024-03-05

## 2024-03-05 RX ORDER — HYDROMORPHONE HYDROCHLORIDE 1 MG/ML
0.6 INJECTION, SOLUTION INTRAMUSCULAR; INTRAVENOUS; SUBCUTANEOUS EVERY 5 MIN PRN
Status: DISCONTINUED | OUTPATIENT
Start: 2024-03-05 | End: 2024-03-05

## 2024-03-05 RX ADMIN — LIDOCAINE HYDROCHLORIDE 10 MG: 10 INJECTION, SOLUTION EPIDURAL; INFILTRATION; INTRACAUDAL; PERINEURAL at 15:30:00

## 2024-03-05 RX ADMIN — CEFAZOLIN SODIUM/WATER 2 G: 2 G/20 ML SYRINGE (ML) INTRAVENOUS at 15:26:00

## 2024-03-05 RX ADMIN — DEXAMETHASONE SODIUM PHOSPHATE 2 MG: 10 INJECTION, SOLUTION INTRAMUSCULAR; INTRAVENOUS at 15:29:00

## 2024-03-05 RX ADMIN — SODIUM CHLORIDE, SODIUM LACTATE, POTASSIUM CHLORIDE, CALCIUM CHLORIDE: 600; 310; 30; 20 INJECTION, SOLUTION INTRAVENOUS at 16:15:00

## 2024-03-05 NOTE — ANESTHESIA PREPROCEDURE EVALUATION
PRE-OP EVALUATION    Patient Name: Jesi Ashford    Admit Diagnosis: Closed displaced fracture of olecranon process of right ulna without intra-articular extension, initial encounter [S52.021A]    Pre-op Diagnosis: Closed displaced fracture of olecranon process of right ulna without intra-articular extension, initial encounter [S52.021A]    RIGHT ELBOW OPEN REDUCTION INTERNAL FIXATION OLECRANON FRACTURE.    Anesthesia Procedure: RIGHT ELBOW OPEN REDUCTION INTERNAL FIXATION OLECRANON FRACTURE. (Right)    Surgeon(s) and Role:     * Monica Piña MD - Primary    Pre-op vitals reviewed.  Temp: 97.3 °F (36.3 °C)  Pulse: 65  Resp: 16  BP: 107/61  SpO2: 96 %  Body mass index is 28.72 kg/m².    Current medications reviewed.  Hospital Medications:   [MAR Hold] acetaminophen (Tylenol Extra Strength) tab 1,000 mg  1,000 mg Oral Once    lactated ringers infusion   Intravenous Continuous    [COMPLETED] ceFAZolin (Ancef) 2 g in 20mL IV syringe premix  2 g Intravenous Once       Outpatient Medications:     Medications Prior to Admission   Medication Sig Dispense Refill Last Dose    montelukast 10 MG Oral Tab Take 1 tablet (10 mg total) by mouth nightly. 90 tablet 3 3/4/2024    loratadine 10 MG Oral Tab Take 1 tablet (10 mg total) by mouth daily. 90 tablet 3 3/5/2024    Calcium 200 MG Oral Tab Take by mouth.   Past Week    Magnesium 80 MG Oral Tab Take by mouth.   Past Week    vitamin E 100 UNITS Oral Cap Take 1 capsule (100 Units total) by mouth daily.   Past Week    Cholecalciferol (VITAMIN D) 50 MCG (2000 UT) Oral Cap Take by mouth.   Past Week    vitamin B-12 50 MCG Oral Tab Take 1 tablet (50 mcg total) by mouth daily.   Past Week    Garlic 2 MG Oral Cap Take by mouth.   Past Week    guaiFENesin-dextromethorphan 100-10 MG/5ML Oral Syrup Take 5 mL by mouth 3 (three) times daily as needed for cough.       HYDROcodone-acetaminophen 5-325 MG Oral Tab TOME GEMINI TABLETA POR LA BOCA CADA SEIS HORAS SUZETTE SEA NECESSARIO POR  DOLOR   Past Week    gabapentin 100 MG Oral Cap Take 2 capsules (200 mg total) by mouth nightly.   3/3/2024    nystatin-triamcinolone 100,000-0.1 Units/g-% External Ointment Apply twice daily  Monday-Friday to affected areas of rash. (Patient taking differently: 2 (two) times daily as needed. Apply twice daily  Monday-Friday to affected areas of rash.) 60 g 1     triamcinolone 0.1 % External Cream Apply topically 2 (two) times daily as needed. 60 g 3     clotrimazole (LOTRIMIN AF) 1 % External Cream Apply 1 each topically 2 (two) times daily. (Patient taking differently: Apply 1 each topically 2 (two) times daily as needed.) 60 g 1     lisinopril 20 MG Oral Tab Take 1 tablet (20 mg total) by mouth daily. 90 tablet 3 3/4/2024    naproxen 500 MG Oral Tab Take 1 tablet (500 mg total) by mouth 2 (two) times daily with meals. 120 tablet 0 Past Week    Timolol Maleate 0.5 % Ophthalmic Solution Apply to eye.  0     latanoprost 0.005 % Ophthalmic Solution Place 1 drop into both eyes nightly. 3 Bottle 2        Allergies: Seasonal      Anesthesia Evaluation    Patient summary reviewed.    Anesthetic Complications  (-) history of anesthetic complications         GI/Hepatic/Renal    Negative GI/hepatic/renal ROS.                             Cardiovascular                  (+) hypertension   (+) hyperlipidemia                                  Endo/Other    Negative endo/other ROS.                              Pulmonary    Negative pulmonary ROS.                       Neuro/Psych    Negative neuro/psych ROS.                                  Past Surgical History:   Procedure Laterality Date          COLONOSCOPY  2010    external hemorrhoids, redundant/tortuous colon, sigmoid diverticulosis, and melanosis coli    EGD  2010    hiatal hernia and gastritis    OTHER SURGICAL HISTORY Left 2013    L forearm ORIF     Social History     Socioeconomic History    Marital status:    Tobacco Use    Smoking  status: Never    Smokeless tobacco: Never   Vaping Use    Vaping Use: Never used   Substance and Sexual Activity    Alcohol use: No    Drug use: No   Other Topics Concern    Pt has a pacemaker No    Pt has a defibrillator No    Reaction to local anesthetic No    Caffeine Concern No    Exercise No    Seat Belt Yes    Special Diet No    Stress Concern No    Weight Concern Yes     History   Drug Use No     Available pre-op labs reviewed.  Lab Results   Component Value Date    WBC 6.1 02/27/2024    RBC 4.15 02/27/2024    HGB 12.7 02/27/2024    HCT 38.9 02/27/2024    MCV 93.7 02/27/2024    MCH 30.6 02/27/2024    MCHC 32.6 02/27/2024    RDW 13.3 02/27/2024    .0 02/27/2024     Lab Results   Component Value Date     02/27/2024    K 4.5 02/27/2024     02/27/2024    CO2 28.0 02/27/2024    BUN 15 02/27/2024    CREATSERUM 0.84 02/27/2024     (H) 02/27/2024    CA 9.4 02/27/2024            Airway      Mallampati: II  Mouth opening: 3 FB  TM distance: 4 - 6 cm  Neck ROM: full Cardiovascular    Cardiovascular exam normal.         Dental    Dentition appears grossly intact         Pulmonary    Pulmonary exam normal.                 Other findings              ASA: 2   Plan: MAC  NPO status verified and patient meets guidelines.    Post-procedure pain management plan discussed with surgeon and patient.  Surgeon requests: regional block    Plan/risks discussed with: patient and                 Present on Admission:  **None**

## 2024-03-05 NOTE — BRIEF OP NOTE
Pre-Operative Diagnosis: Closed displaced fracture of olecranon process of right ulna without intra-articular extension, initial encounter [S52.021A]     Post-Operative Diagnosis: Closed displaced fracture of olecranon process of right ulna without intra-articular extension, initial encounter [S52.021A]      Procedure Performed:   RIGHT ELBOW OPEN REDUCTION INTERNAL FIXATION OLECRANON FRACTURE.    Surgeon(s) and Role:     * Monica Piña MD - Primary    Assistant(s):  PADarcy Lopez PA-C     Surgical Findings: see operative report     Specimen: na      Estimated Blood Loss: Blood Output: 10 mL (3/5/2024  5:45 PM)        Darcy Ryan PA-C  3/5/2024  5:50 PM

## 2024-03-05 NOTE — DISCHARGE INSTRUCTIONS
Post-operative Instructions    Medication: Before you leave the hospital, you will be given a prescription for a pain reliever. This medication contains a narcotic with acetaminophen (Tylenol), so do not take any additional Tylenol.    You may take Tylenol or Ibuprofen instead of the prescription as the pain subsides. If you take a daily Aspirin you may resume taking your Aspirin the evening of surgery.       Dressings: The splint should remain clean, dry and intact and should not be removed.  You can use the sling for comfort.  Please contact our office with any issues regarding the splint.      Showering: Before showering, apply a waterproof cover over the splint.  It cannot get wet.        Follow up: You will be scheduled for a follow up office visit in 10 days for follow up.  Your plan for physical therapy, driving, and returning to work will be discussed at this appointment.     Please don't hesitate to contact our office at [phone] if you have any post-operative questions or concerns.         You have been given a prescription for Norco 5/325  Norco was Given to you at:  Next dose due:    Take this medication as directed  This medication contains Tylenol (acetaminophen)  Do not take additional Tylenol while taking Norco    Norco is a Narcotic and can be constipating or upset your stomach  Don't take Norco on an empty stomach  Drink plenty of water  Alcoholic beverages should be avoided while taking narcotics

## 2024-03-05 NOTE — ANESTHESIA PROCEDURE NOTES
Regional Block    Date/Time: 3/5/2024 3:26 PM    Performed by: Aaron Lobato MD  Authorized by: Aaron Lobato MD      General Information and Staff    Start Time:  3/5/2024 3:26 PM  End Time:  3/5/2024 3:29 PM  Anesthesiologist:  Aaron Lobato MD  Performed by:  Anesthesiologist  Patient Location:  OR      Site Identification: real time ultrasound guided and image stored and retrievable    Block site/laterality marked before start: site marked  Reason for Block: at surgeon's request and post-op pain management    Preanesthetic Checklist: 2 patient identifers, IV checked, site marked, risks and benefits discussed, monitors and equipment checked, pre-op evaluation, timeout performed, anesthesia consent, sterile technique used, no prohibitive neurological deficits and no local skin infection at insertion site      Procedure Details    Patient Position:   Prep: ChloraPrep    Monitoring:  Cardiac monitor, continuous pulse ox and blood pressure cuff  Block Type:  Supraclavicular  Laterality:  Right  Injection Technique:  Single-shot    Needle    Needle Type:  Short-bevel and echogenic  Needle Gauge:  20 G  Needle Length:  50 mm  Needle Localization:  Ultrasound guidance  Reason for Ultrasound Use: appropriate spread of the medication was noted in real time and no ultrasound evidence of intravascular and/or intraneural injection            Assessment    Injection Assessment:  Good spread noted, negative resistance, negative aspiration for heme, incremental injection, low pressure, local visualized surrounding nerve on ultrasound and transient paresthesias  Paresthesia Pain:  Immediately resolved  Heart Rate Change: No    - Patient tolerated block procedure well without evidence of immediate block related complications.     Medications  3/5/2024 3:26 PM      Additional Comments    Medication:  Ropivacaine 0.375% 20mL with PF dexamethasone 2mg

## 2024-03-05 NOTE — INTERVAL H&P NOTE
Pre-op Diagnosis: Closed displaced fracture of olecranon process of right ulna without intra-articular extension, initial encounter [S52.021A]    The above referenced H&P was reviewed by Monica Piña MD on 3/5/2024, the patient was examined and no significant changes have occurred in the patient's condition since the H&P was performed.  I discussed with the patient and/or legal representative the potential benefits, risks and side effects of this procedure; the likelihood of the patient achieving goals; and potential problems that might occur during recuperation.  I discussed reasonable alternatives to the procedure, including risks, benefits and side effects related to the alternatives and risks related to not receiving this procedure.  We will proceed with procedure as planned.

## 2024-03-06 NOTE — OPERATIVE REPORT
[facility]    PATIENT'S NAME: KETAN GERARDO   ATTENDING PHYSICIAN: Monica Piña M.D.   OPERATING PHYSICIAN: Monica Piña M.D.   PATIENT ACCOUNT#:   [id number]    LOCATION:  [facility]  MEDICAL RECORD #:   [id number]       YOB: 1945  ADMISSION DATE:       03/05/2024      OPERATION DATE:  03/05/2024    OPERATIVE REPORT      PREOPERATIVE DIAGNOSIS:  Right elbow olecranon fracture, displaced.  POSTOPERATIVE DIAGNOSIS:  Right elbow olecranon fracture, displaced.  PROCEDURE:  Open reduction, internal fixation, right elbow olecranon fracture with a tension band construct.    ASSISTANT:  Darcy Ryan PA-C.    ANESTHESIA:  Regional block with sedation.    COMPLICATIONS:  None.    DRAINS:  None.    SPECIMENS:  None.    CONDITION:  Stable.    BLOOD LOSS:  10 mL.    TOURNIQUET TIME:  Less than 2 hours.      IMPLANTS:  K-wires x2 with 1 mm surgical steel wire for a figure-of-eight tension band construct.    INDICATIONS:  The patient is a 78-year-old female who sustained a closed displaced right olecranon fracture due to a fall.  She was provisionally immobilized in a posterior splint and seen in the office last week.  A widely displaced transverse fracture of the olecranon extending into the ulnohumeral joint was identified.  Treatment options were reviewed including both conservative and surgical treatment.  In light of the widely displaced position, my recommendation was for open reduction, internal fixation.  The patient had experience with this treatment in the past, having undergone fixation of the left elbow for a similar fracture.  We discussed the nature of the procedure including risks, benefits, and alternatives.  Risks discussed include, but are not limited to, possible infection, bleeding, neurovascular injury, as well as postop stiffness, delayed union, nonunion, malunion, hardware complications, including symptomatic hardware or hardware failure.  The  possibility of future surgery for hardware removal was also discussed.  Risks of anesthesia were also reviewed including, but not limited to, possible cardiac, pulmonary, or cerebrovascular complications, any of which could be very serious.  Appropriate preoperative medical evaluation and clearance was provided through the patient's primary care physician, and she was felt to be acceptable risk for anesthesia.  Informed consent was obtained.    OPERATIVE TECHNIQUE:  The patient was identified in the preop holding area where right upper extremity was initialed by me.  She was taken to the operating room and placed supine on the operating table.  All bony prominences were well padded and sequential compression devices were placed about the lower legs.  Anesthesia then administered a regional blockade followed by monitored anesthesia control.  She was then placed in a well-padded proximal arm tourniquet and prepped and draped in the usual sterile fashion to the proximal arm.  The patient was stabilized in a Trimano arm spangler with all bony prominences well padded in the wrist and elbow sleeve to allow for access of the posterior proximal ulna.  The arm was held in place across the abdomen to access the posterior aspect of the elbow.  Trimano arm spangler was mounted to the left side of the patient's table for stabilization.    After confirming consent, side, and prophylactic antibiotics with an appropriate time-out, the right upper extremity was exsanguinated and the tourniquet was elevated.  Standard approach to the posterior olecranon was marked in a longitudinal fashion about 4 cm proximal to the tip and about 8 cm distal along the border of the ulna.  A longitudinal incision was made through skin and subcutaneous tissues with a 10 blade.  Underlying edema and hematoma were released.  The posterior fascia to the ulna and olecranon tip was sharply incised in line with the skin incision.  There was a widely displaced  transverse fracture of the olecranon by about 2 cm.  Hematoma and intervening soft tissue was sharply excised with a 15 blade.  Cortical margins were exposed with a knife for anatomic reduction.  Great care was taken to palpate and identify the ulnar nerve and protected throughout the case.  Once adequate anatomic open reduction was felt to be achieved, longitudinal K-wires were passed in an antegrade fashion at the posterior aspect of the olecranon.  These were angled to remain extra-articular but close to the subchondral bone aiming toward the volar cortex of the proximal ulna.  Bicortical fixation of the K-wire was felt to be achieved.  Image intensification was then brought in after passes of 2 wires.  I was satisfied with the wire position which remained extra-articular on the lateral view and engaged the anterior cortex of the proximal ulna distal to the articular surface.  Anatomic reduction was felt to be achieved.  We then the exposed the proximal ulnar shaft at the posterior aspect with a longitudinal split in the overlying fascia.  Great care was taken to hug bone to expose the radial and ulnar cortices to allow for transverse drill hole distal to the ulnar articular surface.  A 1.5 mm drill bit was used, followed by passage of an 18-gauge wire through the transverse drill hole to create a figure-of-eight tension band construct.  A 14-gauge Angiocath was used to percutaneously pass a wire through the triceps tendon proximal to the anterograde K-wires.  The construct was then tensioned near the center of the figure-of-eight in a twisting fashion until compression at the fracture was felt to be achieved.  Image intensification confirmed anatomic reduction and ideal placement of the figure-of-eight tension band construct on both AP and lateral views.  I was satisfied with the reduction, position of the hardware, and tension of the construct.  The K-wires were appropriately bent, cut, and rotated to engage  the proximal aspect of the wire loop.  The twisted ends were then turned down into the cortex of the posterior ulna to prevent hardware irritation.  Final photographs were obtained, and I was satisfied with the final construct.  The elbow was taken through range of motion under image intensification and was felt to be stable from 0 to 120 degrees of flexion.  The wounds were copiously irrigated.  Hemostasis was confirmed with Bovie cautery.  Layered closure was performed using 0 Vicryl for the deep fascia of the ulna, 2-0 Vicryl for the subcutaneous layer, and 4-0 Monocryl for the skin.  Steri-Strips with Mastisol were applied, followed by Xeroform, fluffs, sterile Webril and a posterior mold in 90 degrees of elbow flexion.  The patient tolerated the procedure well with no evidence of intraoperative complication.  She was transferred to the transport cart and taken to the postanesthesia recovery room in stable condition.  All sponge, needle, and instrument counts were correct.    Dedicated assistance from Darcy Ryan PA-C, was paramount for patient positioning, manipulation of the arm and traction, as well as during placement of the hardware.  She also assisted with closure and application of the splint.    Dictated By Monica Piña M.D.  d: 03/05/2024 22:28:36  t: 03/05/2024 23:21:03  Job [id number]  SAMUEL/

## 2024-03-06 NOTE — ANESTHESIA POSTPROCEDURE EVALUATION
[facility]    Jesi Ashford Patient Status:  Hospital Outpatient Surgery   Age/Gender 78 year old female MRN [id number]   Location [facility] PERIOPERATIVE SERVICE Attending No att. providers found   Hosp Day # 0 PCP Kenya Plascencia MD       Anesthesia Post-op Note    RIGHT ELBOW OPEN REDUCTION INTERNAL FIXATION OLECRANON FRACTURE.    Procedure Summary       Date: 03/05/24 Room / Location:  MAIN OR 11 / [facility] MAIN OR    Anesthesia Start: 1525 Anesthesia Stop: 1749    Procedure: RIGHT ELBOW OPEN REDUCTION INTERNAL FIXATION OLECRANON FRACTURE. (Right: Elbow) Diagnosis:       Closed displaced fracture of olecranon process of right ulna without intra-articular extension, initial encounter      (Closed displaced fracture of olecranon process of right ulna without intra-articular extension, initial encounter [S52.021A])    Surgeons: Monica Piña MD Anesthesiologist: Aaron Lobato MD    Anesthesia Type: MAC ASA Status: 2            Anesthesia Type: MAC    Vitals Value Taken Time   /58 03/05/24 1952   Temp 97.3 °F (36.3 °C) 03/05/24 1851   Pulse 80 03/05/24 2001   Resp 16 03/05/24 1945   SpO2 97 % 03/05/24 2001   Vitals shown include unfiled device data.    Patient Location: PACU    Anesthesia Type: MAC    Airway Patency: patent    Postop Pain Control: adequate    Mental Status: mildly sedated but able to meaningfully participate in the post-anesthesia evaluation    Nausea/Vomiting: none    Cardiopulmonary/Hydration status: stable euvolemic    Complications: no apparent anesthesia related complications    Postop vital signs: stable    Dental Exam: Unchanged from Preop    Patient to be discharged home when criteria met.

## 2024-03-18 ENCOUNTER — HOSPITAL ENCOUNTER (OUTPATIENT)
Dept: GENERAL RADIOLOGY | Age: 79
Discharge: HOME | End: 2024-03-18
Attending: PHYSICIAN ASSISTANT
Payer: MEDICARE

## 2024-03-18 ENCOUNTER — OFFICE VISIT (OUTPATIENT)
Dept: ORTHOPEDICS CLINIC | Facility: CLINIC | Age: 79
End: 2024-03-18
Payer: MEDICARE

## 2024-03-18 VITALS — WEIGHT: 172 LBS | BODY MASS INDEX: 28.66 KG/M2 | HEIGHT: 65 IN

## 2024-03-18 DIAGNOSIS — Z98.890 HISTORY OF ELBOW SURGERY: ICD-10-CM

## 2024-03-18 DIAGNOSIS — S42.401A CLOSED FRACTURE OF RIGHT ELBOW, INITIAL ENCOUNTER: ICD-10-CM

## 2024-03-18 DIAGNOSIS — M25.531 RIGHT WRIST PAIN: ICD-10-CM

## 2024-03-18 DIAGNOSIS — Z48.89 AFTERCARE FOLLOWING SURGERY: ICD-10-CM

## 2024-03-18 DIAGNOSIS — S42.401A CLOSED FRACTURE OF RIGHT ELBOW, INITIAL ENCOUNTER: Primary | ICD-10-CM

## 2024-03-18 PROCEDURE — 3008F BODY MASS INDEX DOCD: CPT | Performed by: PHYSICIAN ASSISTANT

## 2024-03-18 PROCEDURE — 73080 X-RAY EXAM OF ELBOW: CPT | Performed by: PHYSICIAN ASSISTANT

## 2024-03-18 PROCEDURE — 1159F MED LIST DOCD IN RCRD: CPT | Performed by: PHYSICIAN ASSISTANT

## 2024-03-18 PROCEDURE — 1160F RVW MEDS BY RX/DR IN RCRD: CPT | Performed by: PHYSICIAN ASSISTANT

## 2024-03-18 PROCEDURE — 73110 X-RAY EXAM OF WRIST: CPT | Performed by: PHYSICIAN ASSISTANT

## 2024-03-18 PROCEDURE — 99024 POSTOP FOLLOW-UP VISIT: CPT | Performed by: PHYSICIAN ASSISTANT

## 2024-03-18 RX ORDER — TRAMADOL HYDROCHLORIDE 50 MG/1
50 TABLET ORAL EVERY 6 HOURS PRN
Qty: 30 TABLET | Refills: 0 | Status: SHIPPED | OUTPATIENT
Start: 2024-03-18

## 2024-03-18 NOTE — PATIENT INSTRUCTIONS
Please start physical therapy for range of motion, call phone number for [place] location on handout  Ok to bend wrist and fingers and do pendulums for shoulder range of motion  Tramadol for pain, RX sent to pharmacy  Hinged elbow brace being delivered to home, lock at 90 degrees but able to unlock for stretching/ROM  Able to get incision wet on Friday in the shower, no soaking/submerging  Follow up in 4 weeks for xrays of the elbow and evaluation with Dr. Piña

## 2024-03-18 NOTE — PROGRESS NOTES
[facility] Post Op Progress Note    Date of Surgery: 03/05/2024      Subjective: Jesi Ashford is a 78 year old female who is here for follow-up of her right elbow.  She underwent right elbow open reduction internal fixation of an olecranon fracture by Dr. Piña approximately 2 weeks ago.  She has struggled with postoperative pain but is not taking the Norco due to constipation.  She has been taking Tylenol.  Pain is localized to the elbow and the wrist.  She has been wearing the splint and the sling with no other complaints.      Objective: Exam the right elbow and upper extremity reveals that the incision is clean, dry, and intact and healing well.  She has moderate swelling of the elbow.  She also has tenderness over the distal radius and distal ulna with mild ecchymosis at the wrist.  She is unable to make a full composite fist.  Sensation is present to light touch.  Palpable radial pulse.      Imaging: XR WRIST COMPLETE (MIN 3 VIEWS), RIGHT (CPT=73110)    Result Date: 3/18/2024   No acute fracture or dislocation.  Osteopenia.  Mild degenerative joint disease.  Soft tissues are unremarkable .    Dictated by (CST): Scott Salas MD on 3/18/2024 at 3:35 PM     Finalized by (CST): Scott Salas MD on 3/18/2024 at 3:37 PM          XR ELBOW, COMPLETE (MIN 3 VIEWS), RIGHT (CPT=73080)    Result Date: 3/18/2024   Prior postsurgical changes of ulnar fixation.  Healing ulnar fractures.   No joint effusion.  Persistent mild soft tissue swelling.     Dictated by (CST): Scott Salas MD on 3/18/2024 at 2:17 PM     Finalized by (CST): Scott Salas MD on 3/18/2024 at 2:20 PM          XR FLUOROSCOPY C-ARM TIME LESS THAN 1 HOUR (CPT=76000)    Result Date: 3/5/2024  CONCLUSION:  Fluoroscopy and spot images for surgical planning.   LOCATION:  [facility]    Dictated by (CST): Celso Peterson MD on 3/05/2024 at 5:42 PM     Finalized by (CST): Celso Peterson MD on 3/05/2024 at 5:44 PM            Assessment:   Status post open reduction  internal fixation right olecranon fracture  Right wrist pain, probably strain      Plan: I reviewed x-rays and exam findings with the patient and her son today.  She did have an ORIF of the right olecranon with a tension band.  The hardware appears stable on x-ray.  At this time it would be reasonable to transition her to a hinged elbow brace.  Unfortunately we do not have this in the office available and she will have one home delivered.  This will be locked at 90 degrees.  We will start her in formal physical therapy for gentle noncyclical range of motion.  This will focus on full extension primarily first then progressing to 115 degrees by week 4 and 130 degrees to full flexion by week 6.  She will also focus on digit, wrist, and pendulums to avoid stiffness.  I demonstrated these exercises during her visit.  She is complaining of right wrist pain.  At this time would like to get an x-ray of her right wrist to evaluate for any fractures that occurred at the time of the fall.  I explained that her pain may be related to the surgery with bleeding settling in her digits and her wrist.  We will obtain an x-ray to rule out fracture.  We did place her in her long-arm splint until the hinged brace is delivered.  She will follow-up in 4 weeks for repeat x-ray of the right elbow at that time or sooner with questions, concerns, or worsening symptoms.    After her visit, I did review the x-rays of the right wrist which show no fractures.        A student was present during the visit after the patient's consent.      Darcy Ryan [facility], PA-C Orthopedic Surgery   [address] [place] [id number]   t: [phone]  f: [phone]

## 2024-04-01 ENCOUNTER — TELEPHONE (OUTPATIENT)
Dept: PHYSICAL THERAPY | Facility: HOSPITAL | Age: 79
End: 2024-04-01

## 2024-04-09 ENCOUNTER — TELEPHONE (OUTPATIENT)
Dept: PHYSICAL THERAPY | Facility: HOSPITAL | Age: 79
End: 2024-04-09

## 2024-04-10 ENCOUNTER — TELEPHONE (OUTPATIENT)
Dept: PHYSICAL THERAPY | Age: 79
End: 2024-04-10

## 2024-04-10 ENCOUNTER — OFFICE VISIT (OUTPATIENT)
Dept: PHYSICAL THERAPY | Age: 79
End: 2024-04-10
Attending: PHYSICIAN ASSISTANT
Payer: MEDICARE

## 2024-04-10 DIAGNOSIS — Z48.89 AFTERCARE FOLLOWING SURGERY: ICD-10-CM

## 2024-04-10 DIAGNOSIS — S42.401A CLOSED FRACTURE OF RIGHT ELBOW, INITIAL ENCOUNTER: Primary | ICD-10-CM

## 2024-04-10 PROCEDURE — 97161 PT EVAL LOW COMPLEX 20 MIN: CPT

## 2024-04-10 PROCEDURE — 97110 THERAPEUTIC EXERCISES: CPT

## 2024-04-10 NOTE — PROGRESS NOTES
P.TLuz EVALUATION:   Referring Physician: Dr. Ryan  Diagnosis:  Closed fracture of right elbow, initial encounter (S42.401A)  Aftercare following surgery (Z48.89)      Date of Onset: 3/5/2024 Date of Service: 4/10/2024     PATIENT SUMMARY   Jesi Ashford is a 78 year old y/o female who presents to therapy s/p right elbow open reduction internal fixation of an olecranon fracture   Pt describes pain level 0/10 at rest; 4/10 with movement   History of current condition: Patient reports she had surgery on 3/5/2024.  Pt states she has been wearing her hinge brace since surgery.  Pt reports most of her pain is in her wrist and hand..  She reports no numbness/tingling, but states she has pain radiating in her hand and fingers (4th and 5th digits more intensely)  Current functional limitations include reaching, lifting, pushing, pulling, carrying, ADLs, sleeping, gripping  Jesi describes prior level of function independent without functional limitations  Pt goals include to return to prior level of function  Past medical history was reviewed with Jesi. Patient has a past medical history of Acute vulvitis (11/29/2022), Allergic conjunctivitis, Cataract, Cataract of both eyes (06/13/2019), Cataract of both eyes (06/13/2019), Chronic right shoulder pain (09/30/2021), Chronic right-sided headaches (09/27/2017), Chronic right-sided headaches (09/27/2017), Chronic upper back pain (04/11/2017), Dry eye syndrome, Dry eye syndrome of both eyes (06/13/2019), Dry eye syndrome of both eyes (06/13/2019), Essential hypertension, External hemorrhoids (12/11/2010), External hemorrhoids (12/11/2010), Foraminal stenosis of cervical region (09/30/2021), Foraminal stenosis of cervical region (09/30/2021), Glaucoma, Hiatal hernia (12/11/2010), Hiatal hernia (12/11/2010), High blood pressure, 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), NSAID induced gastritis (09/30/2021), NSAID induced gastritis (09/30/2021), NSAID induced gastritis  (09/30/2021), Osteoarthritis, Psychophysiological insomnia (02/14/2018), Redundant colon (12/11/2010), Redundant colon (12/11/2010), Redundant colon (12/11/2010), Shingles, Sigmoid diverticulosis (12/11/2010), Sigmoid diverticulosis (12/11/2010), Sigmoid diverticulosis (12/11/2010), Smokers' cough (HCC) (04/04/2023), Smokers' cough (HCC) (04/04/2023), Tendinopathy of rotator cuff (09/30/2021), Tinnitus of both ears (10/02/2018), Trigger point of neck (09/30/2021), Trigger point of neck (09/30/2021), Trigger point of neck (09/30/2021), Trigger point of neck (09/30/2021), and Visual impairment. Other co-morbidities include left forearm fracture  Social hx: Pt is right handed. Pt is retired.   Language: Micronesian; Language line utilized for Lithuanian;  name: Radha;  ID#: [id number]     ASSESSMENT:   Patient presents to PT clinic s/p right elbow ORIF of an olecranon fracture on 3/5/2024.  Patient presents to PT clinic in hinge brace donned to [facility].  Patient demos decreased R elbow ROM, decreased R wrist ROM, decreased RUE strength, altered posture, RUE edema, and pain.  These impairments are functionally limiting pt's ability to reach, lift, , sleep, push, pull, carry, and perform ADLs.  Jesi would benefit from skilled Physical Therapy to address the above impairments to return to prior level of function.     Precautions:  hinge brace; surgeon protocol/instructions    Observation: pt ambulates into clinic independently; hinge brace donned to [facility] and locked at 90 degrees; steri-strips along incision noted; generalized mild to moderate R elbow edema noted    Sensation: normal RUE sensation per patient    ROM:   Elbow PROM:  Flx: R 115 deg, L 142 deg  Ext: R -40 deg, L 0 deg    Wrist P/AROM:  Flexion: R min loss, L WNL  Extension: R min loss/WNL, L WNL  Supination: R WNL (pain), L WNL  Pronation: R WNL (pain), L WNL    Ulnar deviation/Radial deviation: not assessed    Strength/MMT:   UEs:  R: not tested  L:  Grossly 3+/5 or better    Posture: guarded upper trunk posture; forward rounded R shoulder    Gait: pt ambulates into clinic independently without an assistive device    Today’s Treatment and Response:  Patient education provided on PT eval findings, treatment plan, goals, HEP  Patient received there ex, HEP  Charges: PT Eval, TE 1      Total Timed Treatment: 50 min     Total Treatment Time: 50 min      PT Eval Low Complexity    QuickDash score: 61.36     PLAN OF CARE:    Goals:  (per surgical protocol/instructions)  1- Pt will be I with maintenance and progression of HEP  2- Pt will demo increase in R elbow ROM to WNL to ease ability for pt to reach  3- Pt will demo increase in R wrist ROM to WNL to allow pt to perform ADLs  4- Pt will report 1/10 pain or less with performance of ADLs  5- Pt will demo increase in RUE strength to 4/5 or better to ease ability for pt to lift and carry    Frequency / Duration: Patient will be seen for 2x/week or a total of 12-14 visits over a 90 day period. Treatment will include: Modalities prn, manual therapy, therapeutic exercises, therapeutic activity, and instructions on a home program.     Education or treatment limitation: None  Rehab Potential:good    Patient was advised of these findings, precautions, and treatment options and has agreed to actively participate in planning and for this course of care.    Thank you for your referral. Please co-sign or sign and return this letter via fax as soon as possible to [phone]. If you have any questions, please contact me at Dept: [phone]    Sincerely,  Electronically signed by therapist: Yamilex Swenson PT, DPT    Physician's certification required: Yes  I certify the need for these services furnished under this plan of treatment and while under my care.    X___________________________________________________ Date____________________    Certification From: 4/10/2024  To:7/9/2024

## 2024-04-10 NOTE — TELEPHONE ENCOUNTER
Left message to call back. Please transfer to triage. 1st attempt. Please ask pt if she is requesting this medication? [facility] pharmacy requesting a refill. Thank you      The original prescription was discontinued on 2/13/2024 by Kenya Plascencia MD. Renewing this prescription may not be appropriate.

## 2024-04-11 ENCOUNTER — TELEPHONE (OUTPATIENT)
Dept: PHYSICAL THERAPY | Age: 79
End: 2024-04-11

## 2024-04-11 RX ORDER — SERTRALINE HYDROCHLORIDE 25 MG/1
TABLET, FILM COATED ORAL
Qty: 90 TABLET | Refills: 3 | OUTPATIENT
Start: 2024-04-11

## 2024-04-11 NOTE — TELEPHONE ENCOUNTER
Pt was called and she stated that she does not need this medication. Script was denied per pt request.

## 2024-04-11 NOTE — TELEPHONE ENCOUNTER
Called pt and utilized [place]  Bigg ([phone]). Pt had PT eval today and evaluating therapist thinks that pt may want to make schedule changes. Please send me a Where I've Been message to transfer her call to me. If I am unavailable, please send me an in[employer]uminates[employer] message and I will call the pt back. If she expresses to change her appts, I do have holds for Carlos Andrade) at [facility] on 4/12 at 11:30 and 4/18 at 12:30. If rescheduling, please avoid gaps in care. Pt is post-op. Thanks!

## 2024-04-12 ENCOUNTER — OFFICE VISIT (OUTPATIENT)
Dept: PHYSICAL THERAPY | Age: 79
End: 2024-04-12
Attending: PHYSICIAN ASSISTANT
Payer: MEDICARE

## 2024-04-12 DIAGNOSIS — S42.401A CLOSED FRACTURE OF RIGHT ELBOW, INITIAL ENCOUNTER: Primary | ICD-10-CM

## 2024-04-12 PROCEDURE — 97110 THERAPEUTIC EXERCISES: CPT

## 2024-04-12 PROCEDURE — 97140 MANUAL THERAPY 1/> REGIONS: CPT

## 2024-04-12 NOTE — PROGRESS NOTES
Diagnosis:   Closed fracture of right elbow, initial encounter       Referring Provider: Darcy Ryan  Date of Evaluation:    4/10/24    Precautions:   Hinge brace and surgical protocol Next MD visit:   none scheduled  Date of Surgery: n/a   Insurance Primary/Secondary: [employer]NA [facility] / N/A     # Auth Visits: 10            Subjective: Patient reports continued right elbow pain this afternoon. She fel ok after her initial eval.    Pain: 3-4/10      Objective:   Observation: pt ambulates into clinic independently; hinge brace donned to RUE and locked at 90 degrees; steri-strips along incision noted; generalized mild to moderate R elbow edema noted     Sensation: normal RUE sensation per patient     ROM:   Elbow PROM:  Flx: R 115 deg, L 142 deg  Ext: R -40 deg, L 0 deg     Wrist P/AROM:  Flexion: R min loss, L WNL  Extension: R min loss/WNL, L WNL  Supination: R WNL (pain), L WNL  Pronation: R WNL (pain), L WNL     Ulnar deviation/Radial deviation: not assessed     Strength/MMT:   UEs:  R: not tested  L: Grossly 3+/5 or better     Posture: guarded upper trunk posture; forward rounded R shoulder     Gait: pt ambulates into clinic independently without an assistive device      Assessment: Patient presents with decreased rom at the right elbow and wrist. The patient also presents with tightness in all direction. She did have some pain with end range of motion in all directions.      Goals:   per surgical protocol/instructions)  1- Pt will be I with maintenance and progression of HEP  2- Pt will demo increase in R elbow ROM to WNL to ease ability for pt to reach  3- Pt will demo increase in R wrist ROM to WNL to allow pt to perform ADLs  4- Pt will report 1/10 pain or less with performance of ADLs  5- Pt will demo increase in RUE strength to 4/5 or better to ease ability for pt to lift and carry    Plan: Plan to work on stretching, strengthening and rom exercises.  Date: 4/12/2024  TX#: 2/10 Date:                 TX#:  3/ Date:                 TX#: 4/ Date:                 TX#: 5/ Date:   Tx#: 6/   Modalities:  MHP for 10' before her treatment unbilled       Ther. Ex.: 32'  PROM of the right elbow and wrist  Ball Squeezes 2x30  Wrist AROM in all directions 20xea.  Right Wrist Ext. And Flexion St. 2x30\"xea.       Manual: 8'  STM at the right elbow region         HEP: Discussed her HEP.    Charges: 2TE and 1MT       Total Timed Treatment: 45 min  Total Treatment Time: 45 min

## 2024-04-17 ENCOUNTER — OFFICE VISIT (OUTPATIENT)
Dept: PHYSICAL THERAPY | Age: 79
End: 2024-04-17
Attending: PHYSICIAN ASSISTANT
Payer: MEDICARE

## 2024-04-17 PROCEDURE — 97110 THERAPEUTIC EXERCISES: CPT

## 2024-04-17 PROCEDURE — 97140 MANUAL THERAPY 1/> REGIONS: CPT

## 2024-04-17 NOTE — PROGRESS NOTES
Diagnosis:   Closed fracture of right elbow, initial encounter       Referring Provider: Darcy Ryan  Date of Evaluation:    4/10/24    Precautions:   Hinge brace and surgical protocol Next MD visit:   April 22nd, 2024  Date of Surgery: n/a   Insurance Primary/Secondary: [employer]NA [facility] / N/A     # Auth Visits: 10            Subjective:  Patient reports no pain at rest, but pain when trying to move her arm.  Pt reports she will see her surgeon this upcoming Monday, 4/22/2024.     Pain:  3-4/10      Objective:   Observation: pt ambulates into clinic independently; hinge brace donned to RUE and locked at 90 degrees and with sling donned as well; steri-strips along incision noted; generalized mild to moderate R elbow edema noted     Sensation: normal RUE sensation per patient     ROM:   Elbow PROM:  Flx: R 115 deg, L 142 deg  Ext: R -40 deg, L 0 deg     Wrist P/AROM:  Flexion: R min loss, L WNL  Extension: R min loss/WNL, L WNL  Supination: R WNL (pain), L WNL  Pronation: R WNL (pain), L WNL     Ulnar deviation/Radial deviation: not assessed     Strength/MMT:   UEs:  R: not tested  L: Grossly 3+/5 or better     Posture: guarded upper trunk posture; forward rounded R shoulder     Gait: pt ambulates into clinic independently without an assistive device      Assessment:  Initiated shoulder pendulums and additional hand exercises as tolerated.        Goals:   per surgical protocol/instructions)  1- Pt will be I with maintenance and progression of HEP  2- Pt will demo increase in R elbow ROM to WNL to ease ability for pt to reach  3- Pt will demo increase in R wrist ROM to WNL to allow pt to perform ADLs  4- Pt will report 1/10 pain or less with performance of ADLs  5- Pt will demo increase in RUE strength to 4/5 or better to ease ability for pt to lift and carry    Plan: Plan to work on stretching, strengthening and rom exercises.  Date: 4/12/2024  TX#: 2/10 Date:  4/17/2024            TX#: 3/10 Date:                  TX#: 4/ Date:                 TX#: 5/ Date:   Tx#: 6/   Modalities:  MHP for 10' before her treatment unbilled Modalities:  MHP for 5' at end of session to R elbow      Ther. Ex.: 32'  PROM of the right elbow and wrist  Ball Squeezes 2x30  Wrist AROM in all directions 20xea.  Right Wrist Ext. And Flexion St. 2x30\"xea. There ex: 32'  PROM/AAROM right elbow  Wrist AROM in ext/flx 20xea  Yellow finger web 15x  Yellow digiflex 20x  Yellow sponge squeeze 20x  R shoulder pendulums AP, ML 10x ea      Manual: 8'  STM at the right elbow region   Manual: 8'  STM at the right elbow region      HEP: Discussed her HEP.    Charges: 2TE and 1MT       Total Timed Treatment: 45 min  Total Treatment Time: 45 min

## 2024-04-18 ENCOUNTER — OFFICE VISIT (OUTPATIENT)
Dept: PHYSICAL THERAPY | Age: 79
End: 2024-04-18
Attending: ORTHOPAEDIC SURGERY
Payer: MEDICARE

## 2024-04-18 DIAGNOSIS — S42.401A CLOSED FRACTURE OF RIGHT ELBOW, INITIAL ENCOUNTER: Primary | ICD-10-CM

## 2024-04-18 PROCEDURE — 97110 THERAPEUTIC EXERCISES: CPT

## 2024-04-18 PROCEDURE — 97140 MANUAL THERAPY 1/> REGIONS: CPT

## 2024-04-18 NOTE — PROGRESS NOTES
Diagnosis:   Closed fracture of right elbow, initial encounter       Referring Provider: Darcy Ryan  Date of Evaluation:    4/10/24    Precautions:   Hinge brace and surgical protocol Next MD visit:   April 22nd, 2024  Date of Surgery: n/a   Insurance Primary/Secondary: [employer] [facility] / N/A     # Auth Visits: 10            Subjective:  Patient reports no pain at rest, but pain when trying to move her arm.  Pt reports she will see her surgeon this upcoming Monday, 4/22/2024.     Pain:  3-4/10      Objective:   Observation: pt ambulates into clinic independently; hinge brace donned to RUE and locked at 90 degrees and with sling donned as well; steri-strips along incision noted; generalized mild to moderate R elbow edema noted     Sensation: normal RUE sensation per patient     ROM:   Elbow PROM:  Flx: R 115 deg, L 142 deg  Ext: R -40 deg, L 0 deg     Wrist P/AROM:  Flexion: R min loss, L WNL  Extension: R min loss/WNL, L WNL  Supination: R WNL (pain), L WNL  Pronation: R WNL (pain), L WNL     Ulnar deviation/Radial deviation: not assessed     Strength/MMT:   UEs:  R: not tested  L: Grossly 3+/5 or better     Posture: guarded upper trunk posture; forward rounded R shoulder     Gait: pt ambulates into clinic independently without an assistive device      Assessment:  Patient presents with pain in end range of motion at the elbow. She also presents with right ue weakness during her strengthening exercises in sitting.      Goals:   per surgical protocol/instructions)  1- Pt will be I with maintenance and progression of HEP  2- Pt will demo increase in R elbow ROM to WNL to ease ability for pt to reach  3- Pt will demo increase in R wrist ROM to WNL to allow pt to perform ADLs  4- Pt will report 1/10 pain or less with performance of ADLs  5- Pt will demo increase in RUE strength to 4/5 or better to ease ability for pt to lift and carry    Plan: Plan to work on stretching, strengthening and rom exercises.  Date:  4/12/2024  TX#: 2/10 Date:  4/17/2024            TX#: 3/10 Date:   4/18/24              TX#: 4/10 Date:                 TX#: 5/ Date:   Tx#: 6/   Modalities:  MHP for 10' before her treatment unbilled Modalities:  MHP for 5' at end of session to R elbow Modalities:  MHP for 10' at end of session to R elbow     Ther. Ex.: 32'  PROM of the right elbow and wrist  Ball Squeezes 2x30  Wrist AROM in all directions 20xea.  Right Wrist Ext. And Flexion St. 2x30\"xea. There ex: 32'  PROM/AAROM right elbow  Wrist AROM in ext/flx 20xea  Yellow finger web 15x  Yellow digiflex 20x  Yellow sponge squeeze 20x  R shoulder pendulums AP, ML 10x ea There ex: 32'  PROM/AAROM right elbow and wrist  Wrist AROM in all directions 20xea  Yellow finger web 20x  Yellow digiflex 20x  Ball squeeze 2x30  R shoulder pendulums AP, ML 20x ea     Manual: 8'  STM at the right elbow region   Manual: 8'  STM at the right elbow region Manual: 8'  STM at the right elbow region     HEP: Did not add any new exercises to her HEP.    Charges: 2TE and 1MT       Total Timed Treatment: 45 min  Total Treatment Time: 45 min

## 2024-04-19 ENCOUNTER — TELEPHONE (OUTPATIENT)
Dept: ORTHOPEDICS CLINIC | Facility: CLINIC | Age: 79
End: 2024-04-19

## 2024-04-19 ENCOUNTER — APPOINTMENT (OUTPATIENT)
Dept: PHYSICAL THERAPY | Age: 79
End: 2024-04-19
Attending: PHYSICIAN ASSISTANT
Payer: MEDICARE

## 2024-04-19 DIAGNOSIS — S42.401A CLOSED FRACTURE OF RIGHT ELBOW, INITIAL ENCOUNTER: Primary | ICD-10-CM

## 2024-04-19 DIAGNOSIS — Z48.89 AFTERCARE FOLLOWING SURGERY: ICD-10-CM

## 2024-04-19 NOTE — TELEPHONE ENCOUNTER
XR ordered per ortho protocol. XR scheduled and patient's son, Ryan, was notified via telephone and will let his mother know that she should arrive 15-20 minutes early, in order for them to complete imaging.

## 2024-04-22 ENCOUNTER — OFFICE VISIT (OUTPATIENT)
Dept: ORTHOPEDICS CLINIC | Facility: CLINIC | Age: 79
End: 2024-04-22
Payer: MEDICARE

## 2024-04-22 ENCOUNTER — HOSPITAL ENCOUNTER (OUTPATIENT)
Dept: GENERAL RADIOLOGY | Age: 79
Discharge: HOME | End: 2024-04-22
Attending: ORTHOPAEDIC SURGERY
Payer: MEDICARE

## 2024-04-22 ENCOUNTER — APPOINTMENT (OUTPATIENT)
Dept: PHYSICAL THERAPY | Age: 79
End: 2024-04-22
Attending: PHYSICIAN ASSISTANT
Payer: MEDICARE

## 2024-04-22 VITALS — BODY MASS INDEX: 28.66 KG/M2 | WEIGHT: 172 LBS | HEIGHT: 65 IN

## 2024-04-22 DIAGNOSIS — S42.401A CLOSED FRACTURE OF RIGHT ELBOW, INITIAL ENCOUNTER: ICD-10-CM

## 2024-04-22 DIAGNOSIS — Z48.89 AFTERCARE FOLLOWING SURGERY: Primary | ICD-10-CM

## 2024-04-22 DIAGNOSIS — S52.021D: ICD-10-CM

## 2024-04-22 DIAGNOSIS — Z48.89 AFTERCARE FOLLOWING SURGERY: ICD-10-CM

## 2024-04-22 PROCEDURE — 1160F RVW MEDS BY RX/DR IN RCRD: CPT | Performed by: ORTHOPAEDIC SURGERY

## 2024-04-22 PROCEDURE — 1125F AMNT PAIN NOTED PAIN PRSNT: CPT | Performed by: ORTHOPAEDIC SURGERY

## 2024-04-22 PROCEDURE — 99024 POSTOP FOLLOW-UP VISIT: CPT | Performed by: ORTHOPAEDIC SURGERY

## 2024-04-22 PROCEDURE — 73080 X-RAY EXAM OF ELBOW: CPT | Performed by: ORTHOPAEDIC SURGERY

## 2024-04-22 PROCEDURE — 1159F MED LIST DOCD IN RCRD: CPT | Performed by: ORTHOPAEDIC SURGERY

## 2024-04-22 PROCEDURE — 3008F BODY MASS INDEX DOCD: CPT | Performed by: ORTHOPAEDIC SURGERY

## 2024-04-22 NOTE — PROGRESS NOTES
[facility] Fracture follow-up Progress Note      Date of Surgery:  3/5/24    History: Patient is a 78-year-old female seen for postop follow-up of her right elbow ORIF.  A/V language line interpretation services were used for today's entire encounter.  The patient relates good tolerance of her sling and brace with no wound complications and adequate pain control.  She does have some residual stiffness and has had 2 visits with PT.    Physical Exam: Right elbow posterior incision is fully healed with no redness, edema or residual swelling.  She has moderate limitations in motion with 10 to 115 degrees of flexion with symmetrical full supination and pronation compared to the left upper extremity.  Hand and wrist range of motion is within normal limits.  She has an intact sensory, motor and perfusion exam.    Imaging Results: Multiple views right elbow personally viewed, demonstrating no interval change in position of an anatomically reduced olecranon fracture with tension band wire and pins in place without evidence of migration.  Fracture line is no longer visible suggestive of healing.      No results found.    Assessment: Diagnoses and all orders for this visit:    Aftercare following surgery    Closed displaced fracture of olecranon process of right ulna without intra-articular extension with routine healing, subsequent encounter      Plan: With the assistance of language line interpretation services I communicated to the patient that she should continue with physical therapy with a goal of achieving symmetrical range of motion compared to the left upper extremity.  There will be some discomfort and tension as she expands her range of motion which is part of the rehab process.  Patient was reassured and all questions were answered.  She may wean out of the sling completely as well as the elbow brace.  Either may be used sparingly as a visual cue in crowded public environments as needed.  Follow-up  x-ray and exam is recommended in 4 to 6 weeks at which point she will hopefully have symmetrical range of motion.  All questions were answered and she verbalized understanding and appreciation.      Monica Piña MD  [facility]    The dictation was partially prepared using Dragon Medical voice recognition software.  Although every attempt is made to correct errors where identified, discrepancies may still exist.

## 2024-04-25 ENCOUNTER — OFFICE VISIT (OUTPATIENT)
Dept: PHYSICAL THERAPY | Age: 79
End: 2024-04-25
Attending: PHYSICIAN ASSISTANT
Payer: MEDICARE

## 2024-04-25 PROCEDURE — 97110 THERAPEUTIC EXERCISES: CPT

## 2024-04-25 NOTE — PROGRESS NOTES
Diagnosis:   Closed fracture of right elbow, initial encounter       Referring Provider: Darcy Ryan  Date of Evaluation:    4/10/24    Precautions:   Hinge brace and surgical protocol Next MD visit:   April 22nd, 2024  Date of Surgery: n/a   Insurance Primary/Secondary: [employer] [facility] / N/A     # Auth Visits: 10            Subjective:  Patient reports no pain at rest, but pain when trying to move her arm.  Pt reports her surgeon said she can remove her brace and sling as tolerated.      Pain:  3-4/10      Objective:     4/25/2024:  Observation: bruising noted around elbow     ROM:   4/25/2025:  Elbow PROM:  Flx: R 125 deg, L 142 deg  Ext: R -18 deg, L 0 deg     Strength/MMT:   UEs:  R: not tested  L: Grossly 3+/5 or better      Assessment: Patient demos improved R elbow flexion ROM with reassessment today.        Goals:   per surgical protocol/instructions)  1- Pt will be I with maintenance and progression of HEP  2- Pt will demo increase in R elbow ROM to WNL to ease ability for pt to reach  3- Pt will demo increase in R wrist ROM to WNL to allow pt to perform ADLs  4- Pt will report 1/10 pain or less with performance of ADLs  5- Pt will demo increase in RUE strength to 4/5 or better to ease ability for pt to lift and carry    Plan: Plan to work on stretching, strengthening and rom exercises.  Date: 4/12/2024  TX#: 2/10 Date:  4/17/2024            TX#: 3/10 Date:   4/18/24              TX#: 4/10 Date: 4/25/24                 TX#: 5/10 Date:   Tx#: 6/   Modalities:  MHP for 10' before her treatment unbilled Modalities:  MHP for 5' at end of session to R elbow Modalities:  MHP for 10' at end of session to R elbow Modalities:  MHP for 10' at end of session to R elbow    Ther. Ex.: 32'  PROM of the right elbow and wrist  Ball Squeezes 2x30  Wrist AROM in all directions 20xea.  Right Wrist Ext. And Flexion St. 2x30\"xea. There ex: 32'  PROM/AAROM right elbow  Wrist AROM in ext/flx 20xea  Yellow finger web  15x  Yellow digiflex 20x  Yellow sponge squeeze 20x  R shoulder pendulums AP, ML 10x ea There ex: 32'  PROM/AAROM right elbow and wrist  Wrist AROM in all directions 20xea  Yellow finger web 20x  Yellow digiflex 20x  Ball squeeze 2x30  R shoulder pendulums AP, ML 20x ea There ex: 35'  PROM R elbow flx/ext in supine x 10 minutes  Supine R elbow flexion AROM 10x  Supine R elbow extension AROM 10x  Seated R wrist flexion/extension 2 x 10 ea  Seated R wrist supination/pronation AROM 10x ea  Yellow finger web 30x        Manual: 8'  STM at the right elbow region   Manual: 8'  STM at the right elbow region Manual: 8'  STM at the right elbow region     HEP: Did not add any new exercises to her HEP.    Charges: 2 TE     Total Timed Treatment: 35 min  Total Treatment Time: 45 min

## 2024-04-29 ENCOUNTER — OFFICE VISIT (OUTPATIENT)
Dept: PHYSICAL THERAPY | Age: 79
End: 2024-04-29
Attending: PHYSICIAN ASSISTANT
Payer: MEDICARE

## 2024-04-29 PROCEDURE — 97110 THERAPEUTIC EXERCISES: CPT

## 2024-05-01 ENCOUNTER — TELEPHONE (OUTPATIENT)
Dept: FAMILY MEDICINE CLINIC | Facility: CLINIC | Age: 79
End: 2024-05-01

## 2024-05-01 NOTE — TELEPHONE ENCOUNTER
Patient calling to ask if results from most recent labs can be sent via mail to address on file.

## 2024-05-02 ENCOUNTER — OFFICE VISIT (OUTPATIENT)
Dept: PHYSICAL THERAPY | Age: 79
End: 2024-05-02
Attending: PHYSICIAN ASSISTANT
Payer: MEDICARE

## 2024-05-02 PROCEDURE — 97110 THERAPEUTIC EXERCISES: CPT

## 2024-05-02 NOTE — PROGRESS NOTES
Diagnosis:   Closed fracture of right elbow, initial encounter       Referring Provider: Darcy Ryan  Date of Evaluation:    4/10/24    Precautions:   surgical protocol Next MD visit:   unknown  Date of Surgery: n/a   Insurance Primary/Secondary: [employer]MANA [facility] / N/A     # Auth Visits: 10            Subjective:  Patient presents to PT clinic without sling and brace.  She reports she has been exercising.      Pain:  4/10 with medication      Objective:      ROM:   5/2/2025:  Elbow PROM:  Flx: R 130 deg, L 142 deg  Ext: R -8 deg, L 0 deg     Strength/MMT:   UEs:  R: not tested  L: Grossly 3+/5 or better    Assessment: Patient demos improved R elbow ROM with reassessment.  Initiated light resistance exercises.    Goals:   per surgical protocol/instructions)  1- Pt will be I with maintenance and progression of HEP  2- Pt will demo increase in R elbow ROM to WNL to ease ability for pt to reach  3- Pt will demo increase in R wrist ROM to WNL to allow pt to perform ADLs  4- Pt will report 1/10 pain or less with performance of ADLs  5- Pt will demo increase in RUE strength to 4/5 or better to ease ability for pt to lift and carry    Plan: Plan to work on stretching, strengthening and rom exercises.  Date:   4/18/24              TX#: 4/10 Date: 4/25/24                 TX#: 5/10 Date: 4/29/24  Tx#: 6/10 Date: 5/2/24  Tx#: 7/10   Modalities:  MHP for 10' at end of session to R elbow Modalities:  MHP for 10' at end of session to R elbow Modalities:  MHP for 5' at start of session to R elbow Modalities:  MHP for 5' at start of session to R elbow   There ex: 32'  PROM/AAROM right elbow and wrist  Wrist AROM in all directions 20xea  Yellow finger web 20x  Yellow digiflex 20x  Ball squeeze 2x30  R shoulder pendulums AP, ML 20x ea There ex: 35'  PROM R elbow flx/ext in supine x 10 minutes  Supine R elbow flexion AROM 10x  Supine R elbow extension AROM 10x  Seated R wrist flexion/extension 2 x 10 ea  Seated R wrist  supination/pronation AROM 10x ea  Yellow finger web 30x     There ex: 35'  PROM R elbow flx/ext in supine x 6 minutes  Supine R elbow flexion AROM 10x  Supine R elbow extension AROM 10x  Seated R wrist supination/pronation AROM 2 x 10 ea  Red finger web 30x  Seated R table slides with towel 2 x 10 There ex: 40'  PROM R elbow flx/ext in supine x 6 minutes  Supine B shoulder flexion with wand 10x  Supine chest press with wand 10x  Supine R elbow flexion AROM 10x  Seated R wrist supination/pronation 1# 2 x 10 ea  Seated R elbow ext/flx 1# 2 x 10  Seated R wrist extension/flexion 1# 10x ea  Red finger web 30x       Manual: 8'  STM at the right elbow region        Provided tubagrip size B to utilized for comfort/edema/protection from brace management    HEP: Did not add any new exercises to her HEP.    Charges: 3 TE     Total Timed Treatment: 40 min  Total Treatment Time: 45 min

## 2024-05-06 ENCOUNTER — OFFICE VISIT (OUTPATIENT)
Dept: PHYSICAL THERAPY | Age: 79
End: 2024-05-06
Attending: PHYSICIAN ASSISTANT
Payer: MEDICARE

## 2024-05-06 PROCEDURE — 97110 THERAPEUTIC EXERCISES: CPT

## 2024-05-06 NOTE — PROGRESS NOTES
Diagnosis:   Closed fracture of right elbow, initial encounter       Referring Provider: Darcy Ryan  Date of Evaluation:    4/10/24    Precautions:   surgical protocol Next MD visit:   unknown  Date of Surgery: 3/5/2024   Insurance Primary/Secondary: [employer] [facility] / N/A     # Auth Visits: 10            Subjective:  Patient reports 0/10 pain at rest and 3/10 with movement.      Pain:  0-3/10       Objective:      ROM:   5/6/2025:  Elbow PROM:  Flx: R 135 deg, L 142 deg  Ext: R -8 deg, L 0 deg     Strength/MMT:   UEs:  R: not tested  L: Grossly 3+/5 or better    Assessment:  Patient demos improved R elbow ROM once again today.   Initiated manual mobilizations and progressed UE strengthening interventions.    Goals:   per surgical protocol/instructions)  1- Pt will be I with maintenance and progression of HEP  2- Pt will demo increase in R elbow ROM to WNL to ease ability for pt to reach  3- Pt will demo increase in R wrist ROM to WNL to allow pt to perform ADLs  4- Pt will report 1/10 pain or less with performance of ADLs  5- Pt will demo increase in RUE strength to 4/5 or better to ease ability for pt to lift and carry    Plan: Plan to work on stretching, strengthening and rom exercises.  Date: 4/25/24                 TX#: 5/10 Date: 4/29/24  Tx#: 6/10 Date: 5/2/24  Tx#: 7/10 Date: 5/6/24  Tx#: 8/10   Modalities:  MHP for 10' at end of session to R elbow Modalities:  MHP for 5' at start of session to R elbow Modalities:  MHP for 5' at start of session to R elbow Modalities:  MHP for 6' at start of session to R elbow   There ex: 35'  PROM R elbow flx/ext in supine x 10 minutes  Supine R elbow flexion AROM 10x  Supine R elbow extension AROM 10x  Seated R wrist flexion/extension 2 x 10 ea  Seated R wrist supination/pronation AROM 10x ea  Yellow finger web 30x     There ex: 35'  PROM R elbow flx/ext in supine x 6 minutes  Supine R elbow flexion AROM 10x  Supine R elbow extension AROM 10x  Seated R wrist  supination/pronation AROM 2 x 10 ea  Red finger web 30x  Seated R table slides with towel 2 x 10 There ex: 40'  PROM R elbow flx/ext in supine x 6 minutes  Supine B shoulder flexion with wand 10x  Supine chest press with wand 10x  Supine R elbow flexion AROM 10x  Seated R wrist supination/pronation 1# 2 x 10 ea  Seated R elbow ext/flx 1# 2 x 10  Seated R wrist extension/flexion 1# 10x ea  Red finger web 30x     There ex: 33'  PROM R elbow flx/ext in supine x 6 minutes  Supine B elbow flexion with 2# bar 1 x 10  Seated B wrist flx/ext with wand 2 x 10  Seated R wrist extension/flexion 1# 15x ea  Seated B wrist flex/ext with wand 2 x 10  Red finger web 2x20  Seated scap squeezes 2 x 10      Grade 3 glide of ulna into flexion/extension x 5 minutes    Provided tubagrip size B to utilized for comfort/edema/protection from brace management     HEP: Did not add any new exercises to her HEP.    Charges: 2 TE     Total Timed Treatment: 38 min  Total Treatment Time: 44 min

## 2024-05-09 ENCOUNTER — OFFICE VISIT (OUTPATIENT)
Dept: PHYSICAL THERAPY | Age: 79
End: 2024-05-09
Attending: PHYSICIAN ASSISTANT
Payer: MEDICARE

## 2024-05-09 PROCEDURE — 97110 THERAPEUTIC EXERCISES: CPT

## 2024-05-09 PROCEDURE — 97140 MANUAL THERAPY 1/> REGIONS: CPT

## 2024-05-09 NOTE — PROGRESS NOTES
Diagnosis:   Closed fracture of right elbow, initial encounter       Referring Provider: Darcy Ryan  Date of Evaluation:    4/10/24    Precautions:   surgical protocol Next MD visit:   unknown  Date of Surgery: 3/5/2024   Insurance Primary/Secondary: [employer] [facility] / N/A     # Auth Visits: 10            Subjective:  Patient reports 4/10 elbow pain today.      Pain:  4/10      Objective:      ROM:   5/6/2025:  Elbow PROM:  Flx: R 135 deg, L 142 deg  Ext: R -8 deg, L 0 deg     Strength/MMT:   UEs:  R: not tested  L: Grossly 3+/5 or better    Assessment:  Continued with elbow strengthening as tolerated with use of theraband this session.  Initiated scapular rows to improve shoulder girdle strength.      Goals:   per surgical protocol/instructions)  1- Pt will be I with maintenance and progression of HEP  2- Pt will demo increase in R elbow ROM to WNL to ease ability for pt to reach  3- Pt will demo increase in R wrist ROM to WNL to allow pt to perform ADLs  4- Pt will report 1/10 pain or less with performance of ADLs  5- Pt will demo increase in RUE strength to 4/5 or better to ease ability for pt to lift and carry    Plan: Plan to work on stretching, strengthening and rom exercises.  Date: 4/25/24                 TX#: 5/10 Date: 4/29/24  Tx#: 6/10 Date: 5/2/24  Tx#: 7/10 Date: 5/6/24  Tx#: 8/10 Date: 5/6/24  Tx#: 8/10   Modalities:  MHP for 10' at end of session to R elbow Modalities:  MHP for 5' at start of session to R elbow Modalities:  MHP for 5' at start of session to R elbow Modalities:  MHP for 6' at start of session to R elbow    There ex: 35'  PROM R elbow flx/ext in supine x 10 minutes  Supine R elbow flexion AROM 10x  Supine R elbow extension AROM 10x  Seated R wrist flexion/extension 2 x 10 ea  Seated R wrist supination/pronation AROM 10x ea  Yellow finger web 30x     There ex: 35'  PROM R elbow flx/ext in supine x 6 minutes  Supine R elbow flexion AROM 10x  Supine R elbow extension AROM 10x  Seated  R wrist supination/pronation AROM 2 x 10 ea  Red finger web 30x  Seated R table slides with towel 2 x 10 There ex: 40'  PROM R elbow flx/ext in supine x 6 minutes  Supine B shoulder flexion with wand 10x  Supine chest press with wand 10x  Supine R elbow flexion AROM 10x  Seated R wrist supination/pronation 1# 2 x 10 ea  Seated R elbow ext/flx 1# 2 x 10  Seated R wrist extension/flexion 1# 10x ea  Red finger web 30x     There ex: 33'  PROM R elbow flx/ext in supine x 6 minutes  Supine B elbow flexion with 2# bar 1 x 10  Seated B wrist flx/ext with wand 2 x 10  Seated R wrist extension/flexion 1# 15x ea  Seated B wrist flex/ext with wand 2 x 10  Red finger web 2x20  Seated scap squeezes 2 x 10 There ex: 35 minutes  PROM R elbow flx/ext & wrist flx/ext/ulnar & radial deviation x 15 minutes  Seated R elbow flexion with YTB 2 x 10  Supine B shoulder flexion with wand 10x  Supine B chest press with wand 10x  Supine R elbow flexion 1# 1 x 10  Standing B scap rows with RTB 2 x 10  Standing B tricep pull downs with YTB 2 x 10  Seated scap squeezes 2 x 10          Manual tx:  Grade 3 glide of ulna into flexion/extension x 5 minutes Manual tx:  10 minutes  STM R wrist extensors and flexors, scar incision massage    Provided tubagrip size B to utilized for comfort/edema/protection from brace management      HEP:  5/9/2024: updated HEP - see pt instructions section     Charges: 2 TE Man 1     Total Timed Treatment: 45 min  Total Treatment Time: 45 min

## 2024-05-13 ENCOUNTER — TELEPHONE (OUTPATIENT)
Dept: PHYSICAL THERAPY | Facility: HOSPITAL | Age: 79
End: 2024-05-13

## 2024-05-13 ENCOUNTER — OFFICE VISIT (OUTPATIENT)
Dept: PHYSICAL THERAPY | Age: 79
End: 2024-05-13
Attending: PHYSICIAN ASSISTANT
Payer: MEDICARE

## 2024-05-13 PROCEDURE — 97110 THERAPEUTIC EXERCISES: CPT

## 2024-05-13 NOTE — PROGRESS NOTES
Diagnosis:   Closed fracture of right elbow, initial encounter       Referring Provider: Darcy Ryan  Date of Evaluation:    4/10/24    Precautions:   surgical protocol Next MD visit:   unknown  Date of Surgery: 3/5/2024   Insurance Primary/Secondary: [employer] [facility] / N/A     # Auth Visits: 10            Subjective:  Patient reports her elbow is doing better.  She reports her shoulder is bothering her more.      Pain:  2-3/10      Objective:      ROM:   5/6/2025:  Elbow PROM:  Flx: R 135 deg, L 142 deg  Ext: R -8 deg, L 0 deg     Strength/MMT:   UEs:  R: not tested  L: Grossly 3+/5 or better    Assessment:  Progressed shoulder and scapular exercises this session.      Goals:   per surgical protocol/instructions)  1- Pt will be I with maintenance and progression of HEP  2- Pt will demo increase in R elbow ROM to WNL to ease ability for pt to reach  3- Pt will demo increase in R wrist ROM to WNL to allow pt to perform ADLs  4- Pt will report 1/10 pain or less with performance of ADLs  5- Pt will demo increase in RUE strength to 4/5 or better to ease ability for pt to lift and carry    Plan: Plan to work on stretching, strengthening and rom exercises.  Date: 5/2/24  Tx#: 7/10 Date: 5/6/24  Tx#: 8/10 Date: 5/6/24  Tx#: 8/10 Date: 5/13/24  Tx#: 9/10   Modalities:  MHP for 5' at start of session to R elbow Modalities:  MHP for 6' at start of session to R elbow  Modalities:  MHP for 6' at start of session to R elbow  Cold pack for 10' min at end of session   There ex: 40'  PROM R elbow flx/ext in supine x 6 minutes  Supine B shoulder flexion with wand 10x  Supine chest press with wand 10x  Supine R elbow flexion AROM 10x  Seated R wrist supination/pronation 1# 2 x 10 ea  Seated R elbow ext/flx 1# 2 x 10  Seated R wrist extension/flexion 1# 10x ea  Red finger web 30x     There ex: 33'  PROM R elbow flx/ext in supine x 6 minutes  Supine B elbow flexion with 2# bar 1 x 10  Seated B wrist flx/ext with wand 2 x 10  Seated  R wrist extension/flexion 1# 15x ea  Seated B wrist flex/ext with wand 2 x 10  Red finger web 2x20  Seated scap squeezes 2 x 10 There ex: 35 minutes  PROM R elbow flx/ext & wrist flx/ext/ulnar & radial deviation x 15 minutes  Seated R elbow flexion with YTB 2 x 10  Supine B shoulder flexion with wand 10x  Supine B chest press with wand 10x  Supine R elbow flexion 1# 1 x 10  Standing B scap rows with RTB 2 x 10  Standing B tricep pull downs with YTB 2 x 10  Seated scap squeezes 2 x 10     There ex: 30'  Supine B shoulder flexion with wand 3 x 10  Standing B shoulder extension AROM 2 x 10  Seated L elbow flx/ext 1# 2 x 15  Seated L wrist extension/flexion 1# 2 x 15  Seated L wrist pronation/supination 1# 2 x 15   Standing B shoulder extension with RSC 2 x 12  Standing B scap rows with GSC 1 x 10  Standing L elbow extension with YTB 2 x 10      Manual tx:  Grade 3 glide of ulna into flexion/extension x 5 minutes Manual tx:  10 minutes  STM R wrist extensors and flexors, scar incision massage Updated HEP - see pt instructions section         HEP:  5/9/2024: updated HEP - see pt instructions section     Charges: 2 TE    Total Timed Treatment: 30 min  Total Treatment Time: 46 min

## 2024-05-16 ENCOUNTER — OFFICE VISIT (OUTPATIENT)
Dept: PHYSICAL THERAPY | Age: 79
End: 2024-05-16
Attending: PHYSICIAN ASSISTANT
Payer: MEDICARE

## 2024-05-16 PROCEDURE — 97110 THERAPEUTIC EXERCISES: CPT

## 2024-05-16 NOTE — PROGRESS NOTES
Physical Therapy Progress Summary    Diagnosis:   Closed fracture of right elbow, initial encounter       Referring Provider: Darcy Ryan  Date of Evaluation:    4/10/24    Precautions:   surgical protocol Next MD visit:   5/20/2024  Date of Surgery: 3/5/2024   Insurance [facility]/Secondary: [facility] / N/A     # Auth Visits: 10          Assessment:  Patient seen for 10 PT sessions s/p right elbow open reduction internal fixation of an olecranon fracture.  Patient demos improved R elbow ROM, improved R wrist ROM, improved RUE strength, reduced pain, and has made progress with PT goals.  Pt has reduced difficulty with performance of reaching activities and ADLs.  Pt still limited with lifting activities.  Pt to follow up with surgeon next week for further recommendations.   Subjective:  Patient reports her elbow is doing better.  She would like to switch to do physical therapy in the near future for her back and knee.  Pt states she has an appointment with Dr. Behar June 11, 2024 to address these areas.      Pain:  3/10      Objective:      ROM:   5/16/2025:    Elbow ROM:  Flx: R 140 deg, L 142 deg  Ext: R -5 deg, L 0 deg  Supination: R WNL, L WNL  Pronation: R WNL, L WNL    Wrist ROM:  Wrist extension: R WNL, L WNL  Wrist flexion: R min loss, L WNL    Strength/MMT:   Shoulder flx: R 4+/5, L 5/5  Shoulder abd: R 4/5, L 5/5  Biceps: R 4+/5, L 4+/5  Triceps: R 4/5, L 4+/5  Wrist ext: R 4+/5, L 4+/5  Wrist flx: R 4-/5, L 4+/5    Goals:   per surgical protocol/instructions)  1- Pt will be I with maintenance and progression of HEP - IN PROGRESS  2- Pt will demo increase in R elbow ROM to WNL to ease ability for pt to reach - IN PROGRESS  3- Pt will demo increase in R wrist ROM to WNL to allow pt to perform ADLs - IN PROGRESS  4- Pt will report 1/10 pain or less with performance of ADLs - IN PROGRESS  5- Pt will demo increase in RUE strength to 4/5 or better to ease ability for pt to lift and carry - IN  PROGRESS    Date: 5/6/24  Tx#: 8/10 Date: 5/6/24  Tx#: 8/10 Date: 5/13/24  Tx#: 9/10 Date: 5/16/24  Tx#: 10/16   (exp. 8/8/24)   Modalities:  MHP for 6' at start of session to R elbow  Modalities:  MHP for 6' at start of session to R elbow  Cold pack for 10' min at end of session    There ex: 33'  PROM R elbow flx/ext in supine x 6 minutes  Supine B elbow flexion with 2# bar 1 x 10  Seated B wrist flx/ext with wand 2 x 10  Seated R wrist extension/flexion 1# 15x ea  Seated B wrist flex/ext with wand 2 x 10  Red finger web 2x20  Seated scap squeezes 2 x 10 There ex: 35 minutes  PROM R elbow flx/ext & wrist flx/ext/ulnar & radial deviation x 15 minutes  Seated R elbow flexion with YTB 2 x 10  Supine B shoulder flexion with wand 10x  Supine B chest press with wand 10x  Supine R elbow flexion 1# 1 x 10  Standing B scap rows with RTB 2 x 10  Standing B tricep pull downs with YTB 2 x 10  Seated scap squeezes 2 x 10     There ex: 30'  Supine B shoulder flexion with wand 3 x 10  Standing B shoulder extension AROM 2 x 10  Seated L elbow flx/ext 1# 2 x 15  Seated L wrist extension/flexion 1# 2 x 15  Seated L wrist pronation/supination 1# 2 x 15   Standing B shoulder extension with RSC 2 x 12  Standing B scap rows with GSC 1 x 10  Standing L elbow extension with YTB 2 x 10   There ex: 40'  Reassessment  Seated B wrist extension/flexion with wand 2 x 10 ea  Supine B chest press with wand 2 x 12  Supine B shoulder flexion with wand 2 x 10  Standing R shoulder extension elbow with YTB 10x  Standing R scap rows with RTB 2 x 12     Manual tx:  Grade 3 glide of ulna into flexion/extension x 5 minutes Manual tx:  10 minutes  STM R wrist extensors and flexors, scar incision massage Updated HEP - see pt instructions section Manual tx:  10' minutes  Ice to R elbow in sitting         HEP:  5/9/2024: updated HEP - see pt instructions section     Charges: 3 TE    Total Timed Treatment: 40 min  Total Treatment Time: 50 min      Plan:  Continue skilled Physical Therapy 1-2x/week or a total of 6 visits over a 90 day period. Treatment will include: therapeutic exercises, therapeutic activity, manual tx, modalities       Patient was advised of these findings, precautions, and treatment options and has agreed to actively participate in planning and for this course of care.    Thank you for your referral. If you have any questions, please contact me at Dept: [phone].    Sincerely,  Electronically signed by therapist: Yamilex Swenson, PT, DPT    Physician's certification required:  Yes  Please co-sign or sign and return this letter via fax as soon as possible to [phone].   I certify the need for these services furnished under this plan of treatment and while under my care.    X___________________________________________________ Date____________________    Certification From: 5/16/2024  To:8/14/2024

## 2024-05-17 ENCOUNTER — TELEPHONE (OUTPATIENT)
Dept: ORTHOPEDICS CLINIC | Facility: CLINIC | Age: 79
End: 2024-05-17

## 2024-05-17 DIAGNOSIS — Z48.89 AFTERCARE FOLLOWING SURGERY: Primary | ICD-10-CM

## 2024-05-17 DIAGNOSIS — S52.021D: ICD-10-CM

## 2024-05-17 NOTE — TELEPHONE ENCOUNTER
Could not leave message for patient, she needs to arrive 15-20 minutes early in order to repeat imaging prior to her appointment.

## 2024-05-20 ENCOUNTER — APPOINTMENT (OUTPATIENT)
Dept: PHYSICAL THERAPY | Age: 79
End: 2024-05-20
Attending: PHYSICIAN ASSISTANT
Payer: MEDICARE

## 2024-05-20 ENCOUNTER — OFFICE VISIT (OUTPATIENT)
Dept: ORTHOPEDICS CLINIC | Facility: CLINIC | Age: 79
End: 2024-05-20

## 2024-05-20 ENCOUNTER — HOSPITAL ENCOUNTER (OUTPATIENT)
Dept: GENERAL RADIOLOGY | Age: 79
Discharge: HOME | End: 2024-05-20
Attending: ORTHOPAEDIC SURGERY

## 2024-05-20 VITALS — BODY MASS INDEX: 28.66 KG/M2 | HEIGHT: 65 IN | WEIGHT: 172 LBS

## 2024-05-20 DIAGNOSIS — Z48.89 AFTERCARE FOLLOWING SURGERY: ICD-10-CM

## 2024-05-20 DIAGNOSIS — S52.031D CLOSED DISPLACED INTRA-ARTICULAR FRACTURE OF OLECRANON PROCESS OF RIGHT ULNA WITH ROUTINE HEALING, SUBSEQUENT ENCOUNTER: ICD-10-CM

## 2024-05-20 DIAGNOSIS — Z48.89 AFTERCARE FOLLOWING SURGERY: Primary | ICD-10-CM

## 2024-05-20 DIAGNOSIS — Z96.9 RETAINED ORTHOPEDIC HARDWARE: ICD-10-CM

## 2024-05-20 DIAGNOSIS — S52.021D: ICD-10-CM

## 2024-05-20 PROCEDURE — 1160F RVW MEDS BY RX/DR IN RCRD: CPT | Performed by: ORTHOPAEDIC SURGERY

## 2024-05-20 PROCEDURE — 73080 X-RAY EXAM OF ELBOW: CPT | Performed by: ORTHOPAEDIC SURGERY

## 2024-05-20 PROCEDURE — 99024 POSTOP FOLLOW-UP VISIT: CPT | Performed by: ORTHOPAEDIC SURGERY

## 2024-05-20 PROCEDURE — 1125F AMNT PAIN NOTED PAIN PRSNT: CPT | Performed by: ORTHOPAEDIC SURGERY

## 2024-05-20 PROCEDURE — 3008F BODY MASS INDEX DOCD: CPT | Performed by: ORTHOPAEDIC SURGERY

## 2024-05-20 PROCEDURE — 1159F MED LIST DOCD IN RCRD: CPT | Performed by: ORTHOPAEDIC SURGERY

## 2024-05-20 NOTE — PROGRESS NOTES
[facility] Fracture follow-up Progress Note      Date of Surgery:  3/5/24    History: Patient is a 78-year-old female seen for postop follow-up of her right elbow ORIF.  The patient relates discontinuing her sling and brace.  She has progressed with her functional motion with physical therapy and relates no complications.  She does relate chronic symptoms of low back pain and bilateral knee pain which has now become more of a concern than her right elbow.  There is a history of ORIF to the left elbow as well requiring plate fixation.  This was performed at an outside institution.  She wonders if the hardware needs to come out.    Physical Exam: Right elbow posterior incision is fully healed with no redness, edema or residual swelling.  She is minimally tender over the olecranon.  She has mild limitations in motion with 3 to 130 degrees of flexion with symmetrical full supination and pronation compared to the left upper extremity.  Excellent biceps and triceps strength is noted without complaint.  Hand and wrist range of motion is within normal limits.  She has an intact sensory, motor and perfusion exam.    Imaging Results: Multiple views right elbow personally viewed, demonstrating no interval change in position of an anatomically reduced olecranon fracture with tension band wire and pins in place without evidence of migration, with healing noted.    XR ELBOW, COMPLETE (MIN 3 VIEWS), RIGHT (CPT=73080)    Result Date: 4/22/2024  CONCLUSION:  Stable near anatomic alignment at a right olecranon process fracture with postoperative changes again noted from a previous ORIF.  There is evidence of progressive callus formation/healing response.    Dictated by (CST): Hema Griggs MD on 4/22/2024 at 12:45 PM     Finalized by (CST): Hema Griggs MD on 4/22/2024 at 12:48 PM           Assessment: Diagnoses and all orders for this visit:    Aftercare following surgery    Closed displaced intra-articular  fracture of olecranon process of right ulna with routine healing, subsequent encounter      Plan: I reviewed imaging and exam findings with the patient.  Overall she is doing well and has gone on to heal her olecranon fracture with normalizing motion.  Her trace loss of terminal flexion and extension should improve with continued therapy and home stretching.  Functionally she is doing well and more concerned about her chronic lower back and knee symptoms which preceded her fall and right elbow fracture.  With regard to hardware, tension band implant removal is an elective option when she goes on to complete union of her fracture.  I would likely recommend holding off for at least 6 months after surgery.  She never had any problems with her left elbow olecranon plate and I told her that hardware removal is not necessary and purely up to her.  She should continue with physical therapy with a goal of achieving terminal range of motion and baseline strength.  All questions were answered and she verbalized understanding and appreciation.  At this point, she may follow-up with us as needed particularly if the hardware becomes symptomatic.      Monica Piña MD  [facility]    The dictation was partially prepared using Dragon Medical voice recognition software.  Although every attempt is made to correct errors where identified, discrepancies may still exist.

## 2024-05-22 ENCOUNTER — OFFICE VISIT (OUTPATIENT)
Dept: PHYSICAL THERAPY | Age: 79
End: 2024-05-22
Attending: FAMILY MEDICINE
Payer: MEDICARE

## 2024-05-22 PROCEDURE — 97110 THERAPEUTIC EXERCISES: CPT

## 2024-05-22 NOTE — PROGRESS NOTES
Physical Therapy Discharge Summary    Diagnosis:   Closed fracture of right elbow, initial encounter       Referring Provider: Darcy Ryan  Date of Evaluation:    4/10/24    Precautions:   surgical protocol   Next MD visit:   5/20/2024  Date of Surgery: 3/5/2024   Insurance Primary/Secondary: [employer] [facility] / N/A     # Auth Visits: 10          Subjective:  Patient reports her elbow is doing better, but her shoulder bothers her quite a bit.  She would like to complete her therapy for her elbow today and continue with home exercises.  She states her surgeon stated she can do a home program now.    Pain:  3/10    Objective:      ROM:   5/22/2025:  Elbow ROM:  Flx: R 140 deg, L 142 deg  Ext: R -3 deg, L 0 deg    5/16/2024:    Supination: R WNL, L WNL  Pronation: R WNL, L WNL    Wrist ROM:  Wrist extension: R WNL, L WNL  Wrist flexion: R min loss, L WNL    Strength/MMT:   Shoulder flx: R 4+/5, L 5/5  Shoulder abd: R 4/5, L 5/5  Biceps: R 4+/5, L 4+/5  Triceps: R 4/5, L 4+/5  Wrist ext: R 4+/5, L 4+/5  Wrist flx: R 4-/5, L 4+/5    Goals:   per surgical protocol/instructions)  1- Pt will be I with maintenance and progression of HEP - IN PROGRESS  2- Pt will demo increase in R elbow ROM to WNL to ease ability for pt to reach - IN PROGRESS  3- Pt will demo increase in R wrist ROM to WNL to allow pt to perform ADLs - IN PROGRESS  4- Pt will report 1/10 pain or less with performance of ADLs - IN PROGRESS  5- Pt will demo increase in RUE strength to 4/5 or better to ease ability for pt to lift and carry - IN PROGRESS    Date: 5/6/24  Tx#: 8/10 Date: 5/6/24  Tx#: 8/10 Date: 5/13/24  Tx#: 9/10 Date: 5/16/24  Tx#: 10/16   (exp. 8/8/24) Date: 5/22/24  Tx#: 11/16   (exp. 8/8/24)   Modalities:  MHP for 6' at start of session to R elbow  Modalities:  MHP for 6' at start of session to R elbow  Cold pack for 10' min at end of session     There ex: 33'  PROM R elbow flx/ext in supine x 6 minutes  Supine B elbow flexion with 2# bar 1  x 10  Seated B wrist flx/ext with wand 2 x 10  Seated R wrist extension/flexion 1# 15x ea  Seated B wrist flex/ext with wand 2 x 10  Red finger web 2x20  Seated scap squeezes 2 x 10 There ex: 35 minutes  PROM R elbow flx/ext & wrist flx/ext/ulnar & radial deviation x 15 minutes  Seated R elbow flexion with YTB 2 x 10  Supine B shoulder flexion with wand 10x  Supine B chest press with wand 10x  Supine R elbow flexion 1# 1 x 10  Standing B scap rows with RTB 2 x 10  Standing B tricep pull downs with YTB 2 x 10  Seated scap squeezes 2 x 10     There ex: 30'  Supine B shoulder flexion with wand 3 x 10  Standing B shoulder extension AROM 2 x 10  Seated L elbow flx/ext 1# 2 x 15  Seated L wrist extension/flexion 1# 2 x 15  Seated L wrist pronation/supination 1# 2 x 15   Standing B shoulder extension with RSC 2 x 12  Standing B scap rows with GSC 1 x 10  Standing L elbow extension with YTB 2 x 10   There ex: 40'  Reassessment  Seated B wrist extension/flexion with wand 2 x 10 ea  Supine B chest press with wand 2 x 12  Supine B shoulder flexion with wand 2 x 10  Standing R shoulder extension elbow with YTB 10x  Standing R scap rows with RTB 2 x 12   There ex: 30'   Seated R elbow flexion/ext 1# 10x  Seated R wrist extension/flexion 1# 3 x 10  Supine B shoulder horizontal abd/add 2 x 10  Supine B shoulder flexion with wand 2 x 12  Supine R elbow/flx 1# 2 x 10  Supine R elbow flx/ext PROM by clinician  Seated R wrist extension/extension 1# 3 x 10 ea  Standing B scap rows with GSC 2 x 10     Manual tx:  Grade 3 glide of ulna into flexion/extension x 5 minutes Manual tx:  10 minutes  STM R wrist extensors and flexors, scar incision massage Updated HEP - see pt instructions section Manual tx:  10' minutes  Ice to R elbow in sitting Manual tx: 10 min  STM to scar incision & to R forearm flexors/extensors           HEP:  5/22/2024: updated HEP - see pt instructions section     Assessment:  Patient has made progress with PT goals  and will continue with a home program at this time.      Goals:  (per surgical protocol/instructions)  1- Pt will be I with maintenance and progression of HEP - MET  2- Pt will demo increase in R elbow ROM to WNL to ease ability for pt to reach - MET  3- Pt will demo increase in R wrist ROM to WNL to allow pt to perform ADLs - MET  4- Pt will report 1/10 pain or less with performance of ADLs - NOT MET  5- Pt will demo increase in RUE strength to 4/5 or better to ease ability for pt to lift and carry - NOT MET     Plan: Discharge to home program    Charges: 3 TE    Total Timed Treatment: 45 min  Total Treatment Time: 45 min

## 2024-05-24 ENCOUNTER — OFFICE VISIT (OUTPATIENT)
Dept: FAMILY MEDICINE CLINIC | Facility: CLINIC | Age: 79
End: 2024-05-24

## 2024-05-24 VITALS
HEIGHT: 65 IN | BODY MASS INDEX: 28.32 KG/M2 | HEART RATE: 60 BPM | DIASTOLIC BLOOD PRESSURE: 64 MMHG | SYSTOLIC BLOOD PRESSURE: 124 MMHG | RESPIRATION RATE: 18 BRPM | OXYGEN SATURATION: 94 % | WEIGHT: 170 LBS

## 2024-05-24 DIAGNOSIS — R04.2 BLOOD-TINGED SPUTUM: Primary | ICD-10-CM

## 2024-05-24 DIAGNOSIS — M54.59 OTHER LOW BACK PAIN: ICD-10-CM

## 2024-05-24 PROCEDURE — 3008F BODY MASS INDEX DOCD: CPT | Performed by: FAMILY MEDICINE

## 2024-05-24 PROCEDURE — 1159F MED LIST DOCD IN RCRD: CPT | Performed by: FAMILY MEDICINE

## 2024-05-24 PROCEDURE — 3078F DIAST BP <80 MM HG: CPT | Performed by: FAMILY MEDICINE

## 2024-05-24 PROCEDURE — 99214 OFFICE O/P EST MOD 30 MIN: CPT | Performed by: FAMILY MEDICINE

## 2024-05-24 PROCEDURE — 1125F AMNT PAIN NOTED PAIN PRSNT: CPT | Performed by: FAMILY MEDICINE

## 2024-05-24 PROCEDURE — 3074F SYST BP LT 130 MM HG: CPT | Performed by: FAMILY MEDICINE

## 2024-05-24 PROCEDURE — 1160F RVW MEDS BY RX/DR IN RCRD: CPT | Performed by: FAMILY MEDICINE

## 2024-05-24 RX ORDER — NAPROXEN 500 MG/1
500 TABLET ORAL 2 TIMES DAILY WITH MEALS
Qty: 60 TABLET | Refills: 1 | Status: SHIPPED | OUTPATIENT
Start: 2024-05-24

## 2024-05-24 NOTE — PROGRESS NOTES
Subjective:   Patient ID: Jesi Ashford is a 78 year old female.    HPI  Patient here for low back pain and knee pain   Doing physical therapy but not helping enough '  Hesitant to do steroid injection   Also had one occasion with blood tinged sputum   Denie any cough   History/Other:   Review of Systems   Constitutional: Negative.  Negative for fatigue.   Respiratory:  Negative for cough, chest tightness and shortness of breath.    Cardiovascular: Negative.    Musculoskeletal:  Positive for back pain and gait problem. Negative for joint swelling and myalgias.   Neurological:  Negative for weakness and numbness.         Current Outpatient Medications   Medication Sig Dispense Refill    naproxen 500 MG Oral Tab Take 1 tablet (500 mg total) by mouth 2 (two) times daily with meals. 60 tablet 1    traMADol 50 MG Oral Tab Take 1 tablet (50 mg total) by mouth every 6 (six) hours as needed for Pain. (Patient not taking: Reported on 4/22/2024) 30 tablet 0    HYDROcodone-acetaminophen (NORCO) 5-325 MG Oral Tab Take 1 tablet by mouth every 4 to 6 hours as needed for Pain. (Patient not taking: Reported on 3/18/2024) 30 tablet 0     Allergies:  Allergies   Allergen Reactions    Seasonal OTHER (SEE COMMENTS)     Runny eyes       Objective:   Physical Exam  Constitutional:       Appearance: She is well-developed.   Cardiovascular:      Rate and Rhythm: Normal rate and regular rhythm.      Heart sounds: Normal heart sounds.   Pulmonary:      Effort: Pulmonary effort is normal. No respiratory distress.      Breath sounds: Normal breath sounds. No wheezing or rales.   Abdominal:      General: There is no distension.      Palpations: Abdomen is soft.      Tenderness: There is no abdominal tenderness.   Musculoskeletal:         General: Tenderness present. No deformity.      Lumbar back: Spasms and tenderness present. Decreased range of motion.   Neurological:      Mental Status: She is alert and oriented to person, place,  and time.      Motor: No abnormal muscle tone.      Coordination: Coordination normal.      Deep Tendon Reflexes: Reflexes are normal and symmetric. Reflexes normal.         Assessment & Plan:   1. Blood-tinged sputum    Meeds to do x ray lung   Also needs to see dentist   2. Low back pain - wants to try acupuncture   To make an appointment   Continue also as per dr Behar  No orders of the defined types were placed in this encounter.      Meds This Visit:  Requested Prescriptions     Signed Prescriptions Disp Refills    naproxen 500 MG Oral Tab 60 tablet 1     Sig: Take 1 tablet (500 mg total) by mouth 2 (two) times daily with meals.       Imaging & Referrals:  XR CHEST AP/PA (1 VIEW) (CPT=71045)

## 2024-06-05 ENCOUNTER — TELEPHONE (OUTPATIENT)
Dept: PHYSICAL THERAPY | Facility: HOSPITAL | Age: 79
End: 2024-06-05

## 2024-06-06 ENCOUNTER — APPOINTMENT (OUTPATIENT)
Dept: PHYSICAL THERAPY | Age: 79
End: 2024-06-06
Attending: PHYSICIAN ASSISTANT
Payer: MEDICARE

## 2024-06-10 ENCOUNTER — APPOINTMENT (OUTPATIENT)
Dept: PHYSICAL THERAPY | Age: 79
End: 2024-06-10
Attending: PHYSICIAN ASSISTANT
Payer: MEDICARE

## 2024-06-11 ENCOUNTER — OFFICE VISIT (OUTPATIENT)
Dept: PHYSICAL MEDICINE AND REHAB | Facility: CLINIC | Age: 79
End: 2024-06-11
Payer: MEDICARE

## 2024-06-11 VITALS
RESPIRATION RATE: 18 BRPM | WEIGHT: 170 LBS | BODY MASS INDEX: 28.32 KG/M2 | OXYGEN SATURATION: 99 % | HEART RATE: 73 BPM | HEIGHT: 65 IN

## 2024-06-11 DIAGNOSIS — T39.395A NSAID INDUCED GASTRITIS: ICD-10-CM

## 2024-06-11 DIAGNOSIS — M48.061 LUMBAR FORAMINAL STENOSIS: ICD-10-CM

## 2024-06-11 DIAGNOSIS — M79.10 MYALGIA: Primary | ICD-10-CM

## 2024-06-11 DIAGNOSIS — M51.37 DDD (DEGENERATIVE DISC DISEASE), LUMBOSACRAL: ICD-10-CM

## 2024-06-11 DIAGNOSIS — M47.816 FACET SYNDROME, LUMBAR: ICD-10-CM

## 2024-06-11 DIAGNOSIS — M17.10 PRIMARY OSTEOARTHRITIS OF KNEE, UNSPECIFIED LATERALITY: ICD-10-CM

## 2024-06-11 DIAGNOSIS — M54.16 LUMBAR RADICULOPATHY: ICD-10-CM

## 2024-06-11 DIAGNOSIS — E78.5 HYPERLIPIDEMIA, UNSPECIFIED HYPERLIPIDEMIA TYPE: ICD-10-CM

## 2024-06-11 DIAGNOSIS — M22.2X9 PATELLOFEMORAL PAIN SYNDROME, UNSPECIFIED LATERALITY: ICD-10-CM

## 2024-06-11 DIAGNOSIS — M47.816 LUMBAR SPONDYLOSIS: ICD-10-CM

## 2024-06-11 DIAGNOSIS — M51.36 BULGE OF LUMBAR DISC WITHOUT MYELOPATHY: ICD-10-CM

## 2024-06-11 DIAGNOSIS — M54.59 MECHANICAL LOW BACK PAIN: ICD-10-CM

## 2024-06-11 DIAGNOSIS — K29.60 NSAID INDUCED GASTRITIS: ICD-10-CM

## 2024-06-11 PROCEDURE — 99214 OFFICE O/P EST MOD 30 MIN: CPT | Performed by: PHYSICAL MEDICINE & REHABILITATION

## 2024-06-11 PROCEDURE — 1159F MED LIST DOCD IN RCRD: CPT | Performed by: PHYSICAL MEDICINE & REHABILITATION

## 2024-06-11 PROCEDURE — 3008F BODY MASS INDEX DOCD: CPT | Performed by: PHYSICAL MEDICINE & REHABILITATION

## 2024-06-11 PROCEDURE — 1160F RVW MEDS BY RX/DR IN RCRD: CPT | Performed by: PHYSICAL MEDICINE & REHABILITATION

## 2024-06-11 NOTE — PATIENT INSTRUCTIONS
1) Continue Naprosyn twice per day if needed for pain  2) I am recommending BILATERAL L4-L5 and L5-S1 facet joint injections. You will let me know if you would like to proceed  3) Follow up with Dr. Plascecnia to discuss acupuncture  4) we can also consider a right GTB CSI if right lateral hip pain returns  5) Please begin physical therapy as soon as possible. This will focus on the low back and lateral hip pain  6) Follow up with me in about 2 months

## 2024-06-11 NOTE — PROGRESS NOTES
[facility]  Progress Note    CHIEF COMPLAINT:    Chief Complaint   Patient presents with    Follow - Up     Pt is coming in to F/U on low back pain, Pt states that she is coming in to F/U on MRI results of bilateral low back, Pt states that she is still in pain, Denies N/T pt states that she has azael been off balanced, Pt states that her knees have been week as well. Pain 4/10 Pt states that she jsut took some naprosyn         History of Present Illness:  The patient is a 78 year old  ambidextrous female with significant past medical history of hypertension who presents with low back pain as well as bilateral hip pain with radiation to the anterior thighs.  Fortunately had a fall and had a fracture of the right olecranon which required ORIF by Dr. Piña.  I have reviewed those images and notes.  Today she is mostly complaining of bilateral buttock pain and low back pain.  She rates the pain a 4 out of 10 which is aggravated by standing and walking.  She has been taking Naprosyn which she does find to be helpful.  She is also felt weakness in her knees.  She denies any distal paresthesias but has failed worsening balance and has a longstanding history of falls.    PAST MEDICAL HISTORY:  Past Medical History:    Acute vulvitis    Allergic conjunctivitis    Cataract    Cataract of both eyes    Cataract of both eyes    Chronic right shoulder pain    Chronic right-sided headaches    Chronic right-sided headaches    Chronic upper back pain    Dry eye syndrome    Dry eye syndrome of both eyes    Dry eye syndrome of both eyes    Essential hypertension    External hemorrhoids    External hemorrhoids    Foraminal stenosis of cervical region    Foraminal stenosis of cervical region    Glaucoma    Hiatal hernia    Hiatal hernia    High blood pressure    Melanosis coli    NSAID induced gastritis    NSAID induced gastritis    NSAID induced gastritis    Osteoarthritis    knee, hips     Psychophysiological insomnia    Redundant colon    Redundant colon    Redundant colon    Shingles    Sigmoid diverticulosis    Sigmoid diverticulosis    Sigmoid diverticulosis    Smokers' cough (HCC)    Smokers' cough (HCC)    Tendinopathy of rotator cuff    Tinnitus of both ears    Trigger point of neck    Trigger point of neck    Trigger point of neck    Trigger point of neck    Visual impairment    glasses       SURGICAL HISTORY:  Past Surgical History:   Procedure Laterality Date          Colonoscopy  2010    external hemorrhoids, redundant/tortuous colon, sigmoid diverticulosis, and melanosis coli    Egd  2010    hiatal hernia and gastritis    Other surgical history Left 2013    L forearm ORIF       SOCIAL HISTORY:   Social History     Occupational History    Not on file   Tobacco Use    Smoking status: Never    Smokeless tobacco: Never   Vaping Use    Vaping status: Never Used   Substance and Sexual Activity    Alcohol use: No    Drug use: No    Sexual activity: Not on file       FAMILY HISTORY:   Family History   Problem Relation Age of Onset    Arthritis Father     Cancer Mother         uterine CA    Diabetes Son     Musculo-skelatal Disorder Son         scoliosis       CURRENT MEDICATIONS:   Current Outpatient Medications   Medication Sig Dispense Refill    naproxen 500 MG Oral Tab Take 1 tablet (500 mg total) by mouth 2 (two) times daily with meals. 60 tablet 1       ALLERGIES:   Allergies   Allergen Reactions    Seasonal OTHER (SEE COMMENTS)     Runny eyes       REVIEW OF SYSTEMS:   Review of Systems   Constitutional: Negative.    HENT: Negative.    Eyes: Negative.    Respiratory: Negative.    Cardiovascular: Negative.    Gastrointestinal: Negative.    Genitourinary: Negative.    Musculoskeletal: As per HPI  Skin: Negative.    Neurological: As per HPI  Endo/Heme/Allergies: Negative.    Psychiatric/Behavioral: Negative.      All other systems reviewed and are negative. Pertinent  positives and negatives noted in the HPI.        PHYSICAL EXAM:   Pulse 73   Resp 18   Ht 65\"   Wt 170 lb (77.1 kg)   SpO2 99%   BMI 28.29 kg/m²     Body mass index is 28.29 kg/m².      General: No immediate distress  Head: Normocephalic/ Atraumatic  Eyes: Extra-occular movements intact.   Ears: No auricular hematoma or deformities  Mouth: No lesions or ulcerations  Heart: peripheral pulses intact. Normal capillary refill.   Lungs: Non-labored respirations  Abdomen: No abdominal guarding  Extremities: No lower extremity edema bilaterally   Skin: No lesions noted.   Cognition: alert & oriented x 3, attentive, able to follow 2 step commands, comprehention intact, spontaneous speech intact  Motor:    Musculoskeletal:        Data  [facility] Lab Encounter on 02/27/2024   Component Date Value Ref Range Status    Cholesterol, Total 02/27/2024 159  <200 mg/dL Final    HDL Cholesterol 02/27/2024 49  40 - 59 mg/dL Final    Triglycerides 02/27/2024 80  30 - 149 mg/dL Final    LDL Cholesterol 02/27/2024 95  <100 mg/dL Final    VLDL 02/27/2024 13  0 - 30 mg/dL Final    Non HDL Chol 02/27/2024 110  <130 mg/dL Final    Patient Fasting for Lipid? 02/27/2024 Yes   Final   Office Visit on 02/13/2024   Component Date Value Ref Range Status    Glucose 02/27/2024 107 (H)  70 - 99 mg/dL Final    Sodium 02/27/2024 140  136 - 145 mmol/L Final    Potassium 02/27/2024 4.5  3.5 - 5.1 mmol/L Final    Chloride 02/27/2024 110  98 - 112 mmol/L Final    CO2 02/27/2024 28.0  21.0 - 32.0 mmol/L Final    Anion Gap 02/27/2024 2  0 - 18 mmol/L Final    BUN 02/27/2024 15  9 - 23 mg/dL Final    Creatinine 02/27/2024 0.84  0.55 - 1.02 mg/dL Final    BUN/CREA Ratio 02/27/2024 17.9  10.0 - 20.0 Final    Calcium, Total 02/27/2024 9.4  8.7 - 10.4 mg/dL Final    Calculated Osmolality 02/27/2024 291  275 - 295 mOsm/kg Final    eGFR-Cr 02/27/2024 71  >=60 mL/min/1.73m2 Final    ALT 02/27/2024 14  10 - 49 U/L Final    AST 02/27/2024 29  <=34 U/L Final    Alkaline  Phosphatase 02/27/2024 54 (L)  55 - 142 U/L Final    Bilirubin, Total 02/27/2024 0.8  0.2 - 1.1 mg/dL Final    Total Protein 02/27/2024 6.8  5.7 - 8.2 g/dL Final    Albumin 02/27/2024 4.1  3.2 - 4.8 g/dL Final    Globulin  02/27/2024 2.7 (L)  2.8 - 4.4 g/dL Final    A/G Ratio 02/27/2024 1.5  1.0 - 2.0 Final    Patient Fasting for CMP? 02/27/2024 Yes   Final    TSH 02/27/2024 2.578  0.550 - 4.780 mIU/mL Final    WBC 02/27/2024 6.1  4.0 - 11.0 x10(3) uL Final    RBC 02/27/2024 4.15  3.80 - 5.30 x10(6)uL Final    HGB 02/27/2024 12.7  12.0 - 16.0 g/dL Final    HCT 02/27/2024 38.9  35.0 - 48.0 % Final    MCV 02/27/2024 93.7  80.0 - 100.0 fL Final    MCH 02/27/2024 30.6  26.0 - 34.0 pg Final    MCHC 02/27/2024 32.6  31.0 - 37.0 g/dL Final    RDW-SD 02/27/2024 45.9  35.1 - 46.3 fL Final    RDW 02/27/2024 13.3  11.0 - 15.0 % Final    PLT 02/27/2024 224.0  150.0 - 450.0 10(3)uL Final    Neutrophil Absolute Prelim 02/27/2024 3.91  1.50 - 7.70 x10 (3) uL Final    Neutrophil Absolute 02/27/2024 3.91  1.50 - 7.70 x10(3) uL Final    Lymphocyte Absolute 02/27/2024 1.36  1.00 - 4.00 x10(3) uL Final    Monocyte Absolute 02/27/2024 0.59  0.10 - 1.00 x10(3) uL Final    Eosinophil Absolute 02/27/2024 0.22  0.00 - 0.70 x10(3) uL Final    Basophil Absolute 02/27/2024 0.02  0.00 - 0.20 x10(3) uL Final    Immature Granulocyte Absolute 02/27/2024 0.01  0.00 - 1.00 x10(3) uL Final    Neutrophil % 02/27/2024 63.9  % Final    Lymphocyte % 02/27/2024 22.3  % Final    Monocyte % 02/27/2024 9.7  % Final    Eosinophil % 02/27/2024 3.6  % Final    Basophil % 02/27/2024 0.3  % Final    Immature Granulocyte % 02/27/2024 0.2  % Final   ]      Radiology Imaging:  I reviewed with the patient her X-ray and MRI of the lumbar spine and elbows right   XR ELBOW, COMPLETE (MIN 3 VIEWS), RIGHT (CPT=73080)  Narrative: PROCEDURE: XR ELBOW, COMPLETE (MIN 3 VIEWS), RIGHT (CPT=73080)     COMPARISON: [facility], XR ELBOW, COMPLETE  (MIN 3 VIEWS), RIGHT (CPT=73080), 4/22/2024, 10:20 AM.  [facility], XR ELBOW, COMPLETE (MIN 3 VIEWS), RIGHT (CPT=73080), 3/18/2024, 1:12 PM.     INDICATIONS: Follow-up for right elbow surgery.     TECHNIQUE: 3 views were obtained.       FINDINGS:   BONES: Postoperative changes prior right olecranon ORIF are evident. There are 2 K-wires of the olecranon as well as the cerclage wire; these are radiographically intact. Fracture fragments are in is central in atomic alignment; the fracture line remains   faintly visible with sclerosis along the margins. No suspicious osseous lesions are seen. The radiocapitellar and anterior humeral lines are intact.  SOFT TISSUES: Mild persistent soft tissue swelling is apparent. Negative for discernible unexpected radiopaque foreign body.  EFFUSION: There is no anterior or posterior fat pad elevation to suggest underlying hemarthrosis.                 Impression: CONCLUSION:   Near-anatomic alignment of right olecranon fracture with radiographically uncomplicated hardware.           Dictated by (CST): Franco Elias MD on 5/20/2024 at 1:30 PM       Finalized by (CST): Franco Elias MD on 5/20/2024 at 1:31 PM            PROCEDURE: MRI SPINE LUMBAR (CPT=72148)     COMPARISON: None.     INDICATIONS: M54.16 Lumbar radiculopathy M48.061 Lumbar foraminal stenosis M51.36 Bulge of lumbar disc without myelopathy M47.816 Lumbar spondylosis M54.59 Mechanical low b*     TECHNIQUE: A variety of imaging planes and parameters were utilized for visualization of suspected pathology.     FINDINGS:  NUMERATION: For the purposes of this examination, the lowest fully formed disc space is designated as L5-S1.  ALIGNMENT: There is preservation of the expected lumbar lordosis.  Trace degenerative anterolisthesis of L4 relative to L5.  BONES: No acute lumbar spine fracture.  CORD/CAUDA EQUINA: The distal cord and nerve roots have normal caliber, contour, and signal  intensity.  PARASPINAL AREA: There is an S2 sacral Tarlov cyst.  Partially imaged degenerative right greater than left ligamentum flavum redundancy in the lower thoracic spine at T11-T12.  OTHER: Retroflexed uterus.  Probable small bilateral renal cysts.     LUMBAR DISC LEVELS:  L1-L2: Small diffuse disc bulge.  No substantial resultant spinal canal or neural foraminal stenosis.  L2-L3: Small diffuse disc bulge with slight ligamentum flavum redundancy and minor bilateral facet arthropathy.  No substantial resultant neural compromise.  L3-L4: Diffuse disc bulge with associated central disc protrusion/annular fissure in addition to ligamentum flavum redundancy and mild bilateral facet arthropathy.  No spinal canal or neural foraminal compromise.  L4-L5: Diffuse disc bulge with ligamentum flavum redundancy and bilateral facet arthropathy.  Mild-to-moderate multifactorial spinal canal with minor bilateral neural foraminal stenosis.  L5-S1: Mild disc and facet related degeneration, which does not result in substantial neural compromise.               Impression   CONCLUSION:  1. Mild multilevel lumbar spine degenerative changes as detailed.  Findings result in mild-to-moderate spinal canal as well as minor bilateral neural foraminal stenosis at L4-L5.  Remaining lumbar levels do not demonstrate significant neural compromise.  2. Trace degenerative anterolisthesis of L4 relative to L5 and L5 relative to S1.  3. Retroflexed uterus.  4. Lesser incidental findings as above.        Dictated by (CST): Mikal Dumont MD on 2/28/2024 at 8:57 AM      Finalized by (CST): Mikal Dumont MD on 2/28/2024 at 9:02 AM       ASSESSMENT AND PLAN:  Jesi is a 78-year-old female presents for follow-up of her bilateral low back pain without radicular symptoms.  I believe her symptoms are due to facet arthropathy and lumbar spondylosis stemming from a spondylolisthesis of L4 on L5.  She does have mild to moderate spinal stenosis with  bilateral foraminal stenosis at L4-L5.  I am recommending bilateral L4-L5 and L5-S1 facet joint injections.  If that is unhelpful, then I would recommend an L5-S1 interlaminar epidural steroid injection.  She should continue her Naprosyn twice per day and can also consider acupuncture.  I have also recommended a right greater trochanter bursa injection if she continues to complain of right lateral hip pain.  I have given her a new order for physical therapy focusing on the low back and she will follow-up with me in 2 months or sooner if she would like to proceed with the facet joint injections.       RTC in 2 months or sooner  Discharge Instructions were provided as documented in AVS summary.  The patient was in agreement with the assessment and plan.  All questions were answered.  There were no barriers to learning.         1. Myalgia    2. DDD (degenerative disc disease), lumbosacral    3. Facet syndrome, lumbar    4. Mechanical low back pain    5. Lumbar spondylosis    6. Bulge of lumbar disc without myelopathy    7. Lumbar foraminal stenosis    8. Patellofemoral pain syndrome, unspecified laterality    9. Lumbar radiculopathy    10. Hyperlipidemia, unspecified hyperlipidemia type    11. NSAID induced gastritis    12. Primary osteoarthritis of knee, unspecified laterality        Alex B. Behar MD  Physical Medicine and Rehabilitation/Sports Medicine  [facility]

## 2024-06-12 ENCOUNTER — APPOINTMENT (OUTPATIENT)
Dept: PHYSICAL THERAPY | Age: 79
End: 2024-06-12
Attending: PHYSICIAN ASSISTANT
Payer: MEDICARE

## 2024-06-13 ENCOUNTER — OFFICE VISIT (OUTPATIENT)
Dept: FAMILY MEDICINE CLINIC | Facility: CLINIC | Age: 79
End: 2024-06-13

## 2024-06-13 VITALS
RESPIRATION RATE: 14 BRPM | BODY MASS INDEX: 29.19 KG/M2 | HEIGHT: 65 IN | HEART RATE: 68 BPM | WEIGHT: 175.19 LBS | OXYGEN SATURATION: 93 % | DIASTOLIC BLOOD PRESSURE: 66 MMHG | SYSTOLIC BLOOD PRESSURE: 128 MMHG

## 2024-06-13 DIAGNOSIS — M54.59 OTHER LOW BACK PAIN: Primary | ICD-10-CM

## 2024-06-13 PROCEDURE — 97810 ACUP 1/> WO ESTIM 1ST 15 MIN: CPT | Performed by: FAMILY MEDICINE

## 2024-06-13 PROCEDURE — 99213 OFFICE O/P EST LOW 20 MIN: CPT | Performed by: FAMILY MEDICINE

## 2024-06-13 PROCEDURE — 1160F RVW MEDS BY RX/DR IN RCRD: CPT | Performed by: FAMILY MEDICINE

## 2024-06-13 PROCEDURE — 3008F BODY MASS INDEX DOCD: CPT | Performed by: FAMILY MEDICINE

## 2024-06-13 PROCEDURE — 1125F AMNT PAIN NOTED PAIN PRSNT: CPT | Performed by: FAMILY MEDICINE

## 2024-06-13 PROCEDURE — 97811 ACUP 1/> W/O ESTIM EA ADD 15: CPT | Performed by: FAMILY MEDICINE

## 2024-06-13 PROCEDURE — 1159F MED LIST DOCD IN RCRD: CPT | Performed by: FAMILY MEDICINE

## 2024-06-13 PROCEDURE — 3078F DIAST BP <80 MM HG: CPT | Performed by: FAMILY MEDICINE

## 2024-06-13 PROCEDURE — 3074F SYST BP LT 130 MM HG: CPT | Performed by: FAMILY MEDICINE

## 2024-06-13 NOTE — PROCEDURES
Patient tolerated procedure well in excess of 30 minutes was spent with patient   There was no complications all needles were removed.   Patient was advised to rest today and f/u in 1-2 weeks  Hung hudson , ear points   Gb 39

## 2024-06-13 NOTE — PROGRESS NOTES
Subjective:   Patient ID: Jesi Ashford is a 78 year old female.    HPI  Complaining about pain in the low back and also in the knees   Wanted to try acu treatment   Pain radiates down the back of her legs too   History/Other:   Review of Systems   Constitutional: Negative.  Negative for fatigue.   Respiratory:  Negative for cough, chest tightness and shortness of breath.    Cardiovascular: Negative.    Musculoskeletal:  Positive for back pain and gait problem. Negative for joint swelling and myalgias.   Neurological:  Negative for weakness and numbness.         Current Outpatient Medications   Medication Sig Dispense Refill    naproxen 500 MG Oral Tab Take 1 tablet (500 mg total) by mouth 2 (two) times daily with meals. 60 tablet 1     Allergies:  Allergies   Allergen Reactions    Seasonal OTHER (SEE COMMENTS)     Runny eyes       Objective:   Physical Exam  Constitutional:       Appearance: She is well-developed.   Cardiovascular:      Rate and Rhythm: Normal rate and regular rhythm.      Heart sounds: Normal heart sounds.   Pulmonary:      Effort: Pulmonary effort is normal. No respiratory distress.      Breath sounds: Normal breath sounds. No wheezing or rales.   Abdominal:      General: There is no distension.      Palpations: Abdomen is soft.      Tenderness: There is no abdominal tenderness.   Musculoskeletal:         General: Tenderness present. No deformity.      Lumbar back: Spasms and tenderness present. Decreased range of motion.   Neurological:      Mental Status: She is alert and oriented to person, place, and time.      Motor: No abnormal muscle tone.      Coordination: Coordination normal.      Deep Tendon Reflexes: Reflexes are normal and symmetric. Reflexes normal.         Assessment & Plan:     ICD-10-CM    1. Other low back pain  M54.59       Felt better with acu treatment   F/u in 1-2 weeks       No orders of the defined types were placed in this encounter.      Meds This  Visit:  Requested Prescriptions      No prescriptions requested or ordered in this encounter       Imaging & Referrals:  None

## 2024-06-18 ENCOUNTER — APPOINTMENT (OUTPATIENT)
Dept: PHYSICAL THERAPY | Age: 79
End: 2024-06-18
Attending: PHYSICIAN ASSISTANT
Payer: MEDICARE

## 2024-06-19 ENCOUNTER — OFFICE VISIT (OUTPATIENT)
Dept: FAMILY MEDICINE CLINIC | Facility: CLINIC | Age: 79
End: 2024-06-19

## 2024-06-19 VITALS
DIASTOLIC BLOOD PRESSURE: 72 MMHG | HEART RATE: 68 BPM | OXYGEN SATURATION: 98 % | HEIGHT: 65 IN | WEIGHT: 172.19 LBS | RESPIRATION RATE: 16 BRPM | BODY MASS INDEX: 28.69 KG/M2 | SYSTOLIC BLOOD PRESSURE: 114 MMHG

## 2024-06-19 DIAGNOSIS — M54.59 OTHER LOW BACK PAIN: Primary | ICD-10-CM

## 2024-06-19 PROCEDURE — 3078F DIAST BP <80 MM HG: CPT | Performed by: FAMILY MEDICINE

## 2024-06-19 PROCEDURE — 3008F BODY MASS INDEX DOCD: CPT | Performed by: FAMILY MEDICINE

## 2024-06-19 PROCEDURE — 97814 ACUP 1/> W/ESTIM EA ADDL 15: CPT | Performed by: FAMILY MEDICINE

## 2024-06-19 PROCEDURE — 3074F SYST BP LT 130 MM HG: CPT | Performed by: FAMILY MEDICINE

## 2024-06-19 PROCEDURE — 1160F RVW MEDS BY RX/DR IN RCRD: CPT | Performed by: FAMILY MEDICINE

## 2024-06-19 PROCEDURE — 1125F AMNT PAIN NOTED PAIN PRSNT: CPT | Performed by: FAMILY MEDICINE

## 2024-06-19 PROCEDURE — 1159F MED LIST DOCD IN RCRD: CPT | Performed by: FAMILY MEDICINE

## 2024-06-19 PROCEDURE — 97813 ACUP 1/> W/ESTIM 1ST 15 MIN: CPT | Performed by: FAMILY MEDICINE

## 2024-06-19 NOTE — PROGRESS NOTES
Subjective:   Patient ID: Jesi Ashford is a 78 year old female.    HPI  Patient here for acu treatment for low back pain   History/Other:   Review of Systems   Constitutional: Negative.  Negative for fatigue.   Respiratory:  Negative for cough, chest tightness and shortness of breath.    Cardiovascular: Negative.    Musculoskeletal:  Positive for back pain and gait problem. Negative for joint swelling and myalgias.   Neurological:  Negative for weakness and numbness.         Current Outpatient Medications   Medication Sig Dispense Refill    naproxen 500 MG Oral Tab Take 1 tablet (500 mg total) by mouth 2 (two) times daily with meals. 60 tablet 1     Allergies:  Allergies   Allergen Reactions    Seasonal OTHER (SEE COMMENTS)     Runny eyes       Objective:   Physical Exam  Constitutional:       Appearance: She is well-developed.   Cardiovascular:      Rate and Rhythm: Normal rate and regular rhythm.      Heart sounds: Normal heart sounds.   Pulmonary:      Effort: Pulmonary effort is normal. No respiratory distress.      Breath sounds: Normal breath sounds. No wheezing or rales.   Abdominal:      General: There is no distension.      Palpations: Abdomen is soft.      Tenderness: There is no abdominal tenderness.   Musculoskeletal:         General: Tenderness present. No deformity.      Lumbar back: Spasms and tenderness present. Decreased range of motion.   Neurological:      Mental Status: She is alert.      Motor: No abnormal muscle tone.      Deep Tendon Reflexes: Reflexes are normal and symmetric. Reflexes normal.         Assessment & Plan:     ICD-10-CM    1. Other low back pain  M54.59       Felt slightly better   F/u in 1-2 weeks       No orders of the defined types were placed in this encounter.      Meds This Visit:  Requested Prescriptions      No prescriptions requested or ordered in this encounter       Imaging & Referrals:  None

## 2024-06-19 NOTE — PROCEDURES
Patient tolerated procedure well in excess of 30 minutes was spent with patient   There was no complications all needles were removed.   Patient was advised to rest today and f/u in 1-2 weeks   PENS treatment   L3-S1  Bl 40

## 2024-06-20 ENCOUNTER — OFFICE VISIT (OUTPATIENT)
Dept: PHYSICAL THERAPY | Age: 79
End: 2024-06-20
Attending: PHYSICIAN ASSISTANT
Payer: MEDICARE

## 2024-06-20 PROCEDURE — 97161 PT EVAL LOW COMPLEX 20 MIN: CPT

## 2024-06-20 PROCEDURE — 97110 THERAPEUTIC EXERCISES: CPT

## 2024-06-20 NOTE — PROGRESS NOTES
P.T. EVALUATION:   Referring Physician: Dr. Ryan  Diagnosis: Myalgia (M79.10)  DDD (degenerative disc disease), lumbosacral (M51.37)  Facet syndrome, lumbar (M47.816)  Mechanical low back pain (M54.59)  Lumbar spondylosis (M47.816)  Bulge of lumbar disc without myelopathy (M51.36)  Lumbar foraminal stenosis (M48.061)  Patellofemoral pain syndrome, unspecified laterality (M22.2X9)  Lumbar radiculopathy (M54.16)  Hyperlipidemia, unspecified hyperlipidemia type (E78.5)  NSAID induced gastritis (K29.60,T39.395A)  Primary osteoarthritis of knee, unspecified laterality (M17.10)  Date of Onset: 2023 Date of Service: 6/20/2024     PATIENT SUMMARY   Patient verbalized consent for Physical Therapy evaluation and treatment.  Jesi Ashford is a 78 year old y/o female who presents to therapy today with complaints of back pain, hip pain, knee pain  Pt describes pain level 3-/410 currently  History of current condition: Patient reports her back and hip pain started about a year ago.  She reports pain is located in low back and at times will travel to her right groin.  Pt reports also intermittent right knee pain.  Pt reports no numbness/tingling.   Current functional limitations include bed mobility, walking, sitting, transfers, driving, stand, perform ADLs, bend  Jesi describes prior level of function independent without functional limitations. Pt goals include pain relief and to improve balance and strength  Past medical history was reviewed with Jesi. Patient  has a past medical history of Acute vulvitis (11/29/2022), Allergic conjunctivitis, Cataract of both eyes (06/13/2019), Chronic right shoulder pain (09/30/2021), Chronic right-sided headaches (09/27/2017), Chronic upper back pain (04/11/2017), Dry eye syndrome of both eyes (06/13/2019), Essential hypertension, External hemorrhoids (12/11/2010), External hemorrhoids (12/11/2010), Foraminal stenosis of cervical region (09/30/2021), Glaucoma, Hiatal hernia  (12/11/2010), Hiatal hernia (12/11/2010), High blood pressure, 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), NSAID induced gastritis (09/30/2021), Osteoarthritis, Psychophysiological insomnia (02/14/2018), Redundant colon (12/11/2010), Redundant colon (12/11/2010), Redundant colon (12/11/2010), Shingles, Sigmoid diverticulosis (12/11/2010), Smokers' cough (HCC) (04/04/2023), Tendinopathy of rotator cuff (09/30/2021), Tinnitus of both ears (10/02/2018), Trigger point of neck (09/30/2021), and Visual impairment. Other co-morbidities include hx sciatica LLE, right elbow fx, left arm injury  Social hx: Pt is retired.   Language: Lithuanian and English.       ASSESSMENT:   Patient presents to PT clinic due to low back pain, hip pain, and knee pain.  Patient demos decreased lumbar ROM, decreased BLE strength, altered posture, altered gait, decreased BLE flexibility, decreased knee ROM, and pain.  These impairments are functionally limiting pt's ability to walk, stand, sit, drive, bend, perform bed mobility, perform ADLs, and transfers.  Jesi would benefit from skilled Physical Therapy to address the above impairments to reduce pain and improve balance and strength.      Precautions:  None     OBJECTIVE:   Observation: pt ambulates into clinic independently    Sensation: normal BLE sensation    AROM:  Lumbar ROM:  Flx: WNL (slight pain)  Ext: Min loss (slight pain)  Rot: R mod loss (pain), L mod-max loss   Lat flx: R WNL (pain), L mod loss    Knee ROM:   Ext: R -1 deg, L 0 deg  Flx: R 118 deg, L 122 deg    Flexibility:   Hamstrings: R min loss, L min loss    Strength/MMT:   Hip flx: R 4+/5, L 5/5  Hip abd: B 4-/5  Hip ext: check next session  Knee ext: R 4/5, L 5/5  Knee flx: R 4+/5, L 4/5  Ankle df: R 5/5, L 5/5    Posture: slight slouch sitting     Gait: pt ambulates into clinic independently    Balance: not tested today    Fall History for the past 2 years: yes    Functional Outcome Measure: none    Today’s Treatment and  Response:  Patient education provided on PT eval findings, treatment plan, goals, HEP  Patient received there ex, HEP  Charges: PT Eval, TE 2      Total Timed Treatment: 45 min     Total Treatment Time: 45 min      PT Eval Moderate Complexity  In agreement with evaluation findings and clinical rationale, this evaluation involved Moderate Complexity decision making due to 3+ personal factors/comorbidities, 3 body structures involved/activity limitations, and evolving symptoms including changing pain levels.       PLAN OF CARE:    Goals:    1- Pt will be I with maintenance and progression of HEP  2- Pt will demo increase in BLE strength to 4+/5 or better to ease ability for pt to ambulate community distances  3- Pt will report abolishment of hip and back pain with transfers and bed mobility  4- Pt will demo increase in lumbar ROM to min loss or better to ease ability for pt to bend and perform ADLs    Frequency / Duration: Patient will be seen for 2x/week or a total of 8-12 visits over a 90 day period. Treatment will include: Modalities prn, manual therapy, therapeutic exercises, therapeutic activity, and instructions on a home program.     Education or treatment limitation: multiple body parts involved  Rehab Potential:good    Patient was advised of these findings, precautions, and treatment options and has agreed to actively participate in planning and for this course of care.    Thank you for your referral. Please co-sign or sign and return this letter via fax as soon as possible to [phone]. If you have any questions, please contact me at Dept: [phone]    Sincerely,  Electronically signed by therapist: Yamilex Swenson PT, DPT    Physician's certification required: Yes  I certify the need for these services furnished under this plan of treatment and while under my care.    X___________________________________________________ Date____________________    Certification From: 6/20/2024  To:9/18/2024

## 2024-06-24 ENCOUNTER — OFFICE VISIT (OUTPATIENT)
Dept: PHYSICAL THERAPY | Age: 79
End: 2024-06-24
Attending: PHYSICIAN ASSISTANT
Payer: MEDICARE

## 2024-06-24 PROCEDURE — 97110 THERAPEUTIC EXERCISES: CPT

## 2024-06-24 NOTE — PROGRESS NOTES
Diagnosis: Myalgia (M79.10)  DDD (degenerative disc disease), lumbosacral (M51.37)  Facet syndrome, lumbar (M47.816)  Mechanical low back pain (M54.59)  Lumbar spondylosis (M47.816)  Bulge of lumbar disc without myelopathy (M51.36)  Lumbar foraminal stenosis (M48.061)  Patellofemoral pain syndrome, unspecified laterality (M22.2X9)  Lumbar radiculopathy (M54.16)  Hyperlipidemia, unspecified hyperlipidemia type (E78.5)  NSAID induced gastritis (K29.60,T39.395A)  Primary osteoarthritis of knee, unspecified laterality (M17.10)          Next MD visit: none scheduled    Fall Risk: standard         Precautions: n/a          Medication Changes since last visit?: No    Subjective: Patient reports 2-3/10 pain in her back today with medication.   She reports the medication seems to be helping.  She reports her knee is doing about the same.        Objective:     Date: 6/24/2024  Visit #: 2/6 [facility] exp. 8/8/2024   HEP   -   Therapeutic Exercise   X 36 minutes  - NuStep level 4 (LEs only) x 5 minutes  - supine R/L LTR 10x ea 5 sec holds ea  - supine R/L SKTC 10x ea 5 sec holds  - hooklying R/L hip abduction/ER with GTB above knees 15x ea 3-5 sec holds ea  - prone R/L knee flexion 20x ea  - prone B gluteal squeezes 20x  - standing lumbar extension 10x   Manual Therapy   X 6 minutes  - prone lumbar spine mobilizations grade 3    Therapeutic Activity   -   Modalities                Assessment: Session focused on lower body stretching and lumbar ROM exercises.       Plan: continue PT    Charges:  Ex 2   Total Timed Treatment: 42 min  Total Treatment Time: 42 min

## 2024-06-26 ENCOUNTER — OFFICE VISIT (OUTPATIENT)
Dept: PHYSICAL THERAPY | Age: 79
End: 2024-06-26
Attending: PHYSICIAN ASSISTANT
Payer: MEDICARE

## 2024-06-26 PROCEDURE — 97110 THERAPEUTIC EXERCISES: CPT

## 2024-06-26 NOTE — PROGRESS NOTES
Diagnosis: Myalgia (M79.10)  DDD (degenerative disc disease), lumbosacral (M51.37)  Facet syndrome, lumbar (M47.816)  Mechanical low back pain (M54.59)  Lumbar spondylosis (M47.816)  Bulge of lumbar disc without myelopathy (M51.36)  Lumbar foraminal stenosis (M48.061)  Patellofemoral pain syndrome, unspecified laterality (M22.2X9)  Lumbar radiculopathy (M54.16)  Hyperlipidemia, unspecified hyperlipidemia type (E78.5)  NSAID induced gastritis (K29.60,T39.395A)  Primary osteoarthritis of knee, unspecified laterality (M17.10)          Next MD visit: none scheduled    Fall Risk: standard         Precautions: n/a          Medication Changes since last visit?: No    Subjective: Patient reports in general she is having pain from the weather in her joints.  Patient states she has pain in her back after prolonged sitting.      Objective:     Date: 6/26/2024  [facility] Date: 6/24/2024  Visit #: 2/6 [facility] exp. 8/8/2024   HEP   - updated HEP - see pt instructions section -   Therapeutic Exercise   X 45 minutes  - NuStep level 4 (LEs only) x 7 minutes  - supine R/L LTR 10x ea 5 sec holds ea  - supine R/L SKTC 10x ea 5 sec holds  - supine B gluteal squeezes 20x 5 sec holds  - hooklying R/L hip abduction/ER with GTB above knees 2 x 10 ea 3-5 sec holds ea  - sidelying R/L hip abduction 2 x 5 reps ea  - seated R/L LAQs 10x ea 5 sec holds  - seated slouch overcorrect 10x  - standing lumbar extension over mat table 2 x 10 X 36 minutes  - NuStep level 4 (LEs only) x 5 minutes  - supine R/L LTR 10x ea 5 sec holds ea  - supine R/L SKTC 10x ea 5 sec holds  - hooklying R/L hip abduction/ER with GTB above knees 15x ea 3-5 sec holds ea  - prone R/L knee flexion 20x ea  - prone B gluteal squeezes 20x  - standing lumbar extension 10x   Manual Therapy    X 6 minutes  - prone lumbar spine mobilizations grade 3    Therapeutic Activity    -   Modalities                  Assessment: Continued with gentle lumbar  stretching.  Advanced reps of LE strengthening interventions.  Updated HEP.       Plan: continue PT    Charges:  Ex 3   Total Timed Treatment: 45 min  Total Treatment Time: 45 min

## 2024-07-10 ENCOUNTER — OFFICE VISIT (OUTPATIENT)
Dept: PHYSICAL THERAPY | Age: 79
End: 2024-07-10
Attending: FAMILY MEDICINE
Payer: MEDICARE

## 2024-07-10 PROCEDURE — 97110 THERAPEUTIC EXERCISES: CPT

## 2024-07-10 NOTE — PROGRESS NOTES
Diagnosis: Myalgia (M79.10)  DDD (degenerative disc disease), lumbosacral (M51.37)  Facet syndrome, lumbar (M47.816)  Mechanical low back pain (M54.59)  Lumbar spondylosis (M47.816)  Bulge of lumbar disc without myelopathy (M51.36)  Lumbar foraminal stenosis (M48.061)  Patellofemoral pain syndrome, unspecified laterality (M22.2X9)  Lumbar radiculopathy (M54.16)  Hyperlipidemia, unspecified hyperlipidemia type (E78.5)  NSAID induced gastritis (K29.60,T39.395A)  Primary osteoarthritis of knee, unspecified laterality (M17.10)          Next MD visit: none scheduled    Fall Risk: standard         Precautions: n/a          Medication Changes since last visit?: No    Subjective: Patient reports no current back or hip pain.  She states she just notices she is veering side to side when walking.     Objective:     Date: 7/10/2024  [facility] Date: 6/26/2024  Visit #: 3/6 [facility] exp[facility] 8/8/2024 Date: 6/24/2024  Visit #: 2/6 [facility] exp. 8/8/2024   HEP    - updated HEP - see pt instructions section -   Therapeutic Exercise   X 45 minutes  - NuStep level 4 (LEs only) x 7   Minutes  - supine R/L SLR 10x ea  - supine bridges 12x  - supine R/L LTR 10x ea 5 sec holds ea  - supine R/L SKTC 12x ea 5 sec holds  - standing R/L marching 10x ea  - standing R/L hip abduction 10x ea  - standing B gastroc stretch on slant board level 3 2 x 1 minute X 45 minutes  - NuStep level 4 (LEs only) x 7 minutes  - supine R/L LTR 10x ea 5 sec holds ea  - supine R/L SKTC 10x ea 5 sec holds  - supine B gluteal squeezes 20x 5 sec holds  - hooklying R/L hip abduction/ER with GTB above knees 2 x 10 ea 3-5 sec holds ea  - sidelying R/L hip abduction 2 x 5 reps ea  - seated R/L LAQs 10x ea 5 sec holds  - seated slouch overcorrect 10x  - standing lumbar extension over mat table 2 x 10 X 36 minutes  - NuStep level 4 (LEs only) x 5 minutes  - supine R/L LTR 10x ea 5 sec holds ea  - supine R/L SKTC 10x ea 5 sec holds  - hooklying  R/L hip abduction/ER with GTB above knees 15x ea 3-5 sec holds ea  - prone R/L knee flexion 20x ea  - prone B gluteal squeezes 20x  - standing lumbar extension 10x   Manual Therapy     X 6 minutes  - prone lumbar spine mobilizations grade 3    Therapeutic Activity     -   Modalities                    Assessment: Advanced hip strengthening interventions in standing this session.        Plan: continue PT    Charges:  Ex 3   Total Timed Treatment: 45 min  Total Treatment Time: 45 min

## 2024-07-17 ENCOUNTER — OFFICE VISIT (OUTPATIENT)
Dept: PHYSICAL THERAPY | Age: 79
End: 2024-07-17
Attending: FAMILY MEDICINE
Payer: MEDICARE

## 2024-07-17 DIAGNOSIS — T39.395A NSAID INDUCED GASTRITIS: ICD-10-CM

## 2024-07-17 DIAGNOSIS — E78.5 HYPERLIPIDEMIA, UNSPECIFIED HYPERLIPIDEMIA TYPE: ICD-10-CM

## 2024-07-17 DIAGNOSIS — M51.37 DDD (DEGENERATIVE DISC DISEASE), LUMBOSACRAL: ICD-10-CM

## 2024-07-17 DIAGNOSIS — M54.16 LUMBAR RADICULOPATHY: ICD-10-CM

## 2024-07-17 DIAGNOSIS — M17.10 PRIMARY OSTEOARTHRITIS OF KNEE, UNSPECIFIED LATERALITY: ICD-10-CM

## 2024-07-17 DIAGNOSIS — M47.816 FACET SYNDROME, LUMBAR: ICD-10-CM

## 2024-07-17 DIAGNOSIS — M79.10 MYALGIA: Primary | ICD-10-CM

## 2024-07-17 DIAGNOSIS — M54.59 MECHANICAL LOW BACK PAIN: ICD-10-CM

## 2024-07-17 DIAGNOSIS — M51.36 BULGE OF LUMBAR DISC WITHOUT MYELOPATHY: ICD-10-CM

## 2024-07-17 DIAGNOSIS — K29.60 NSAID INDUCED GASTRITIS: ICD-10-CM

## 2024-07-17 DIAGNOSIS — M22.2X9 PATELLOFEMORAL PAIN SYNDROME, UNSPECIFIED LATERALITY: ICD-10-CM

## 2024-07-17 DIAGNOSIS — M48.061 LUMBAR FORAMINAL STENOSIS: ICD-10-CM

## 2024-07-17 DIAGNOSIS — M47.816 LUMBAR SPONDYLOSIS: ICD-10-CM

## 2024-07-17 PROCEDURE — 97110 THERAPEUTIC EXERCISES: CPT

## 2024-07-17 NOTE — PROGRESS NOTES
Diagnosis: Myalgia (M79.10)  DDD (degenerative disc disease), lumbosacral (M51.37)  Facet syndrome, lumbar (M47.816)  Mechanical low back pain (M54.59)  Lumbar spondylosis (M47.816)  Bulge of lumbar disc without myelopathy (M51.36)  Lumbar foraminal stenosis (M48.061)  Patellofemoral pain syndrome, unspecified laterality (M22.2X9)  Lumbar radiculopathy (M54.16)  Hyperlipidemia, unspecified hyperlipidemia type (E78.5)  NSAID induced gastritis (K29.60,T39.395A)  Primary osteoarthritis of knee, unspecified laterality (M17.10)          Next MD visit: none scheduled    Fall Risk: standard         Precautions: n/a          Medication Changes since last visit?: No    Subjective: Patient reports 7/10 low back pain (R>L) yesterday.  She reports her back is doing better today.  She states she took naproxen earlier.      Objective:     Date: 7/17/2024  Visit #: 7/14 [facility] exp. 10/16/2024 Date: 7/10/2024  Visit #: 5/6 [facility] exp. 8/8/2024 Date: 6/26/2024  Visit #: 4/6 [facility] exp. 8/8/2024   HEP     - updated HEP - see pt instructions section   Therapeutic Exercise   X 38 minutes  - hooklying B hip adduction ball squeeze 2 x 10 ea 3 sec holds  - R/L gluteal stretch by clinician 4x ea 10-15 sec holds  - supine bridges 1 x 12 reps  - supine B gluteal squeezes 20x 5 sec holds  - supine R/L LTR 10x ea 5 sec holds ea  - sidelying R/L hip abduction 2 x 5 reps ea  - standing B gastroc stretch on slant board level 2 3 x 30 sec holds   - seated SB stretch into R/center/L flexion 5 reps ea 5-10 sec holds     X 45 minutes  - NuStep level 4 (LEs only) x 7   Minutes  - supine R/L SLR 10x ea  - supine bridges 12x  - supine R/L LTR 10x ea 5 sec holds ea  - supine R/L SKTC 12x ea 5 sec holds  - standing R/L marching 10x ea  - standing R/L hip abduction 10x ea  - standing B gastroc stretch on slant board level 2 3 x 30 sec holds   - seated SB stretch into R/center/L flexion 5 reps 10 sec holds X 45 minutes  - NuStep level 4 (LEs  only) x 7 minutes  - supine R/L LTR 10x ea 5 sec holds ea  - supine R/L SKTC 10x ea 5 sec holds  - supine B gluteal squeezes 20x 5 sec holds  - hooklying R/L hip abduction/ER with GTB above knees 2 x 10 ea 3-5 sec holds ea  - sidelying R/L hip abduction 2 x 5 reps ea  - seated R/L LAQs 10x ea 5 sec holds  - seated slouch overcorrect 10x  - standing lumbar extension over mat table 2 x 10   Manual Therapy        Therapeutic Activity        Modalities                    Assessment:  Continued with global hip strengthening and added gluteal stretch.        Plan: continue PT    Charges:  Ex 3   Total Timed Treatment: 38 min  Total Treatment Time: 38 min

## 2024-07-22 ENCOUNTER — NURSE TRIAGE (OUTPATIENT)
Dept: FAMILY MEDICINE CLINIC | Facility: CLINIC | Age: 79
End: 2024-07-22

## 2024-07-22 DIAGNOSIS — L98.9 FACIAL LESION: Primary | ICD-10-CM

## 2024-07-22 NOTE — TELEPHONE ENCOUNTER
Left message notifying patient per instructions below.  Encouraged to call with questions or concerns.

## 2024-07-22 NOTE — TELEPHONE ENCOUNTER
Action Requested: Summary for Provider     []  Critical Lab, Recommendations Needed  [x] Need Additional Advice  []   FYI    []   Need Orders  [] Need Medications Sent to Pharmacy  []  Other     SUMMARY: Patient reports she has a growth slightly smaller than the size of a raisin below her right eye.  Today she noticed some bleeding from the lesion.  She believes it started around 2024.  She is asking for a referral to see a dermatologist.  Declined appointment offer with primary care today.  Dr Plascencia not in the office today, routed to pod mate Dr Evans, please advise on referral to Dermatology?    RN to follow, if patient does not answer when you call back, OK to leave a message.    Reason for call: Acute facial lesion below right eye  Onset: 2024    Language Line [place], [place] [id number], [place]  Patient called (identified name and ),     Reason for Disposition   Patient wants to be seen    Protocols used: Skin Lesion - Moles or Growths-A-OH

## 2024-07-23 ENCOUNTER — APPOINTMENT (OUTPATIENT)
Dept: PHYSICAL THERAPY | Age: 79
End: 2024-07-23
Attending: PHYSICAL MEDICINE & REHABILITATION
Payer: MEDICARE

## 2024-07-24 ENCOUNTER — OFFICE VISIT (OUTPATIENT)
Dept: PHYSICAL THERAPY | Age: 79
End: 2024-07-24
Attending: FAMILY MEDICINE
Payer: MEDICARE

## 2024-07-24 DIAGNOSIS — H47.20 OPTIC NERVE ATROPHY: Primary | ICD-10-CM

## 2024-07-24 PROCEDURE — 97110 THERAPEUTIC EXERCISES: CPT

## 2024-07-24 NOTE — PROGRESS NOTES
Diagnosis: Myalgia (M79.10)  DDD (degenerative disc disease), lumbosacral (M51.37)  Facet syndrome, lumbar (M47.816)  Mechanical low back pain (M54.59)  Lumbar spondylosis (M47.816)  Bulge of lumbar disc without myelopathy (M51.36)  Lumbar foraminal stenosis (M48.061)  Patellofemoral pain syndrome, unspecified laterality (M22.2X9)  Lumbar radiculopathy (M54.16)  Hyperlipidemia, unspecified hyperlipidemia type (E78.5)  NSAID induced gastritis (K29.60,T39.395A)  Primary osteoarthritis of knee, unspecified laterality (M17.10)          Next MD visit: none scheduled    Fall Risk: standard         Precautions: n/a          Medication Changes since last visit?: No    Subjective: Patient reports her right hip/back region has been bothering her.      Objective:     Date: 7/24/2024  Visit #: 8/14 [facility] exp. 10/16/2024 Date: 7/17/2024  Visit #: 7/14 [facility] exp. 10/16/2024 Date: 7/10/2024  Visit #: 5/6 [facility] exp. 8/8/2024   HEP   - updated HEP - see pt instructions section     Therapeutic Exercise   X 44 minutes  - NuStep level 4 (LEs only) x 5   Minutes  - hooklying B hip adduction ball squeeze 2 x 10 ea 3 sec holds  - supine bridges 1 x 15 reps   - supine R/L LTR 10x ea 5 sec holds ea  - standing R/L hip abduction 1# 1 x 15 ea  - standing R/L hip flexion 1# 1 x 15 ea  - standing R/L hip extension 1# 1 x 12 ea  - standing B gastroc stretch on slant board level 2 3 x 30 sec holds   - hooklying R/L hip abd/ER with GTB above knees 1 x 15 ea  - R/L SLS 2 x 15 sec holds ea X 38 minutes  - hooklying B hip adduction ball squeeze 2 x 10 ea 3 sec holds  - R/L gluteal stretch by clinician 4x ea 10-15 sec holds  - supine bridges 1 x 12 reps  - supine B gluteal squeezes 20x 5 sec holds  - supine R/L LTR 10x ea 5 sec holds ea  - sidelying R/L hip abduction 2 x 5 reps ea  - standing B gastroc stretch on slant board level 2 3 x 30 sec holds   - seated SB stretch into R/center/L flexion 5 reps ea 5-10 sec holds     X 45  minutes  - NuStep level 4 (LEs only) x 7   Minutes  - supine R/L SLR 10x ea  - supine bridges 12x  - supine R/L LTR 10x ea 5 sec holds ea  - supine R/L SKTC 12x ea 5 sec holds  - standing R/L marching 10x ea  - standing R/L hip abduction 10x ea  - standing B gastroc stretch on slant board level 2 3 x 30 sec holds   - seated SB stretch into R/center/L flexion 5 reps 10 sec holds   Manual Therapy        Therapeutic Activity        Modalities                    Assessment:  Initiated balance challenges and progressed standing hip strengthening exercises.      Plan: continue PT    Charges:  Ex 3   Total Timed Treatment: 44 min  Total Treatment Time: 44 min

## 2024-07-25 ENCOUNTER — APPOINTMENT (OUTPATIENT)
Dept: PHYSICAL THERAPY | Age: 79
End: 2024-07-25
Attending: PHYSICAL MEDICINE & REHABILITATION
Payer: MEDICARE

## 2024-07-29 ENCOUNTER — TELEPHONE (OUTPATIENT)
Dept: PHYSICAL THERAPY | Facility: HOSPITAL | Age: 79
End: 2024-07-29

## 2024-07-29 NOTE — TELEPHONE ENCOUNTER
Please advise if you would like patient to continue. Thanks.    Refill passed per [facility] protocol.    Requested Prescriptions   Pending Prescriptions Disp Refills    NAPROXEN 500 MG Oral Tab [Pharmacy Med Name: NAPROXEN 500 MG Tablet] 120 tablet 5     Sig: TOME 1 TABLETA DOS VECES AL TONY CON LAS COMIDAS       Non-Narcotic Pain Medication Protocol Passed - 7/24/2024  7:24 PM        Passed - In person appointment or virtual visit in the past 6 mos or appointment in next 3 mos     Recent Outpatient Visits              5 days ago     [facility] [place] Ymailex Swenson, PT    Office Visit    1 week ago Myalgia    [facility] [place] Yamilex Swenson, PT    Office Visit    2 weeks ago     [facility]    [facility]    1 month ago     [facility]    [facility] Visit    1 month ago     [facility] [place] Yamilex Swenson, PT    Office Visit          Future Appointments         Provider Department Appt Notes    Tomorrow Yamilex Swenson, PT [facility] [place] 8 visits 7/16 to 10/16  [employer] MA  c/p $20    In 3 days MessiYamilex max, PT [facility] [place] 8 visits 7/16 to 10/16  [employer] MA  c/p $20                       Recent Outpatient Visits              5 days ago     [facility] [place] Yamilex Swenson, PT    Office Visit    1 week ago Myalgia    [facility] [place] Yamilex Swenson, PT    Office Visit    2 weeks ago     [facility]    [facility]    1 month ago     [facility]    [facility] Visit    1 month ago     [facility] [place] Yamilex Swenson, PT    Office Visit          Future Appointments         Provider Department Appt Notes    Tomorrow Yamilex Swenson, PT [facility] [place] 8 visits 7/16 to 10/16  [employer]  MA  c/p $20    In 3 days Yamilex Swenson, PT [place] [place] 8 visits 7/16 to 10/16  [facility]ylee MONROE  c/p $20

## 2024-07-30 ENCOUNTER — APPOINTMENT (OUTPATIENT)
Dept: PHYSICAL THERAPY | Age: 79
End: 2024-07-30
Attending: PHYSICAL MEDICINE & REHABILITATION
Payer: MEDICARE

## 2024-07-30 RX ORDER — NAPROXEN 500 MG/1
500 TABLET ORAL 2 TIMES DAILY WITH MEALS
Qty: 60 TABLET | Refills: 0 | Status: SHIPPED | OUTPATIENT
Start: 2024-07-30

## 2024-08-01 ENCOUNTER — OFFICE VISIT (OUTPATIENT)
Dept: PHYSICAL THERAPY | Age: 79
End: 2024-08-01
Attending: PHYSICAL MEDICINE & REHABILITATION
Payer: MEDICARE

## 2024-08-01 PROCEDURE — 97110 THERAPEUTIC EXERCISES: CPT

## 2024-08-01 NOTE — PROGRESS NOTES
Diagnosis: Myalgia (M79.10)  DDD (degenerative disc disease), lumbosacral (M51.37)  Facet syndrome, lumbar (M47.816)  Mechanical low back pain (M54.59)  Lumbar spondylosis (M47.816)  Bulge of lumbar disc without myelopathy (M51.36)  Lumbar foraminal stenosis (M48.061)  Patellofemoral pain syndrome, unspecified laterality (M22.2X9)  Lumbar radiculopathy (M54.16)  Hyperlipidemia, unspecified hyperlipidemia type (E78.5)  NSAID induced gastritis (K29.60,T39.395A)  Primary osteoarthritis of knee, unspecified laterality (M17.10)          Next MD visit: none scheduled    Fall Risk: standard         Precautions: n/a          Medication Changes since last visit?: No    Subjective:  Patient reports 3/10 low back pain upon waking this morning.  She reports she took her medicine so she is feeling less pain currently.  Pt reports sometimes she will have groin pain and knee pain.     Objective:     Date: 8/1/2024  Visit #: 9/14 [facility] exp. 10/16/2024 Date: 7/24/2024  Visit #: 8/14 [facility] exp. 10/16/2024 Date: 7/17/2024  Visit #: 7/14 [facility] exp. 10/16/2024   HEP    - updated HEP - see pt instructions section    Therapeutic Exercise   X 50 minutes  - NuStep level 5 (LEs only) x 7   Minutes  - hooklying B hip adduction ball squeeze 25x 3 sec holds  - supine bridges 2 x 10 reps   - supine R/L LTR 10x ea 5 sec holds ea  - sidelying R/L clams with YTB above knees 10x ea  - standing B heel raises 1# 2 x 10  - standing R/L hip abduction 1# 2 x 10 ea  - standing R/L hip extension 1# 2 x 10 ea  - standing B gastroc stretch on slant board level 2 3 x 30 sec holds   - shuttle machine B knee ext/flx 4 bands 2 x 10   X 44 minutes  - NuStep level 4 (LEs only) x 5   Minutes  - hooklying B hip adduction ball squeeze 2 x 10 ea 3 sec holds  - supine bridges 1 x 15 reps   - supine R/L LTR 10x ea 5 sec holds ea  - standing R/L hip abduction 1# 1 x 15 ea  - standing R/L hip flexion 1# 1 x 15 ea  - standing R/L hip extension 1# 1 x 12  ea  - standing B gastroc stretch on slant board level 2 3 x 30 sec holds   - hooklying R/L hip abd/ER with GTB above knees 1 x 15 ea  - R/L SLS 2 x 15 sec holds ea X 38 minutes  - hooklying B hip adduction ball squeeze 2 x 10 ea 3 sec holds  - R/L gluteal stretch by clinician 4x ea 10-15 sec holds  - supine bridges 1 x 12 reps  - supine B gluteal squeezes 20x 5 sec holds  - supine R/L LTR 10x ea 5 sec holds ea  - sidelying R/L hip abduction 2 x 5 reps ea  - standing B gastroc stretch on slant board level 2 3 x 30 sec holds   - seated SB stretch into R/center/L flexion 5 reps ea 5-10 sec holds       Manual Therapy        Therapeutic Activity        Modalities                    Assessment:  Initiated shuttle machine to further progress LE strength.      Plan: continue PT    Charges:  Ex 3   Total Timed Treatment: 50 min  Total Treatment Time: 50 min

## 2024-08-07 ENCOUNTER — OFFICE VISIT (OUTPATIENT)
Dept: PHYSICAL THERAPY | Age: 79
End: 2024-08-07
Attending: FAMILY MEDICINE
Payer: MEDICARE

## 2024-08-07 PROCEDURE — 97110 THERAPEUTIC EXERCISES: CPT

## 2024-08-07 NOTE — PROGRESS NOTES
Diagnosis: Myalgia (M79.10)  DDD (degenerative disc disease), lumbosacral (M51.37)  Facet syndrome, lumbar (M47.816)  Mechanical low back pain (M54.59)  Lumbar spondylosis (M47.816)  Bulge of lumbar disc without myelopathy (M51.36)  Lumbar foraminal stenosis (M48.061)  Patellofemoral pain syndrome, unspecified laterality (M22.2X9)  Lumbar radiculopathy (M54.16)  Hyperlipidemia, unspecified hyperlipidemia type (E78.5)  NSAID induced gastritis (K29.60,T39.395A)  Primary osteoarthritis of knee, unspecified laterality (M17.10)          Next MD visit: none scheduled    Fall Risk: standard         Precautions: n/a          Medication Changes since last visit?: No    Subjective:  Patient reports 3-4/10 low back pain.      Objective:     Date: 8/7/2024  Visit #: 10/14 [facility] exp. 10/16/2024 Date: 8/1/2024  [facility] Date: 7/24/2024  [facility]   HEP   - updated HEP - see pt instructions section; RTB provided for home use  - updated HEP - see pt instructions section   Therapeutic Exercise   X 50 minutes  - NuStep level 6 (LEs only) x 7   Minutes  - seated SB forward flexion 10x  - supine bridges with RTB above knees 2 x 10 reps   - supine R/L LTR with SB 12x ea 5 sec holds ea  - sidelying R/L clams with YTB above knees 12x ea  - standing R/L hip abduction 3# 1 x 10 ea  - standing R/L hip extension 3# 1 x 15 ea  - standing R/L hip flexion 3# 1 x 15 ea  - standing B gastroc stretch on slant board level 3 3 x 30 sec holds    X 50 minutes  - NuStep level 5 (LEs only) x 7   Minutes  - hooklying B hip adduction ball squeeze 25x 3 sec holds  - supine bridges 2 x 10 reps   - supine R/L LTR 10x ea 5 sec holds ea  - sidelying R/L clams with YTB above knees 10x ea  - standing B heel raises 1# 2 x 10  - standing R/L hip abduction 1# 2 x 10 ea  - standing R/L hip extension 1# 2 x 10 ea  - standing B gastroc stretch on slant board level 2 3 x 30 sec holds   - shuttle machine  B knee ext/flx 4 bands 2 x 10   X 44 minutes  - NuStep level 4 (LEs only) x 5   Minutes  - hooklying B hip adduction ball squeeze 2 x 10 ea 3 sec holds  - supine bridges 1 x 15 reps   - supine R/L LTR 10x ea 5 sec holds ea  - standing R/L hip abduction 1# 1 x 15 ea  - standing R/L hip flexion 1# 1 x 15 ea  - standing R/L hip extension 1# 1 x 12 ea  - standing B gastroc stretch on slant board level 2 3 x 30 sec holds   - hooklying R/L hip abd/ER with GTB above knees 1 x 15 ea  - R/L SLS 2 x 15 sec holds ea   Manual Therapy        Therapeutic Activity        Modalities                    Assessment:  Advanced standing hip strengthening interventions.  Updated HEP.      Plan: continue PT    Charges:  Ex 3   Total Timed Treatment: 45 min  Total Treatment Time: 45 min

## 2024-08-13 ENCOUNTER — OFFICE VISIT (OUTPATIENT)
Dept: PHYSICAL THERAPY | Age: 79
End: 2024-08-13
Attending: FAMILY MEDICINE
Payer: MEDICARE

## 2024-08-13 PROCEDURE — 97110 THERAPEUTIC EXERCISES: CPT

## 2024-08-13 NOTE — PROGRESS NOTES
Physical Therapy Progress Summary    Diagnosis: Myalgia (M79.10)  DDD (degenerative disc disease), lumbosacral (M51.37)  Facet syndrome, lumbar (M47.816)  Mechanical low back pain (M54.59)  Lumbar spondylosis (M47.816)  Bulge of lumbar disc without myelopathy (M51.36)  Lumbar foraminal stenosis (M48.061)  Patellofemoral pain syndrome, unspecified laterality (M22.2X9)  Lumbar radiculopathy (M54.16)  Hyperlipidemia, unspecified hyperlipidemia type (E78.5)  NSAID induced gastritis (K29.60,T39.395A)  Primary osteoarthritis of knee, unspecified laterality (M17.10)          Next MD visit: none scheduled    Fall Risk: standard         Precautions: n/a          Medication Changes since last visit?: No    Assessment: Patient seen for PT services due to lumbar radiculopathy, patellofemoral pain syndrome.  Patient graeme improved BLE strength, improved lumbar ROM, reports reduced pain, and has made progress with PT goals.  Patient graeme improved posture and gait pattern with fewer deviations and reports minimal difficulty with functional mobility activities.  She will continue with her home program at this time.      Subjective:  Patient reports some days her back and hips are okay and other days she will experience pain.  She reports the varying weather seems to affect her body.      Objective:    8/13/2024:  MMT:  Hip flx: R 5/5, L 4+/5  Knee ext: R 4+/5, L 5/5  Knee flx: R 5/5, L 4/5  Ankle df: B 5/5    Lumbar ROM:  Flx: WNL (slight pain)  Ext: WNL (no pain)  Rot: R WNL, L min loss  Lat flx: R WNL, L min loss     Date: 8/13/2024  Visit #: 11/14 [facility] exp. 10/16/2024 Date: 8/7/2024  Visit #: 10/14 [facility] exp. 10/16/2024 Date: 8/1/2024  Visit #: 9/14 [facility] exp. 10/16/2024   HEP   - updated HEP - see pt instructions section - updated HEP - see pt instructions section; RTB provided for home use    Therapeutic Exercise   X 40 minutes  - reassessment  - NuStep level 6 (LEs only) x 7   Minutes  - seated SB forward  flexion 10x 5 sec holds  - standing R/L hip abduction/hip extension with RTB at ankles 15x ea  - standing B gastroc stretch on slant board level 3 3 x 30 sec holds   - sidestepping with RTB above knees x 1 minute x 2 reps  - R/L SLS 3 x 30 sec   - R/L tandem stance 2 x 30 sec holds ea X 50 minutes  - NuStep level 6 (LEs only) x 7   Minutes  - seated SB forward flexion 10x  - supine bridges with RTB above knees 2 x 10 reps   - supine R/L LTR with SB 12x ea 5 sec holds ea  - sidelying R/L clams with YTB above knees 12x ea  - standing R/L hip abduction 3# 1 x 10 ea  - standing R/L hip extension 3# 1 x 15 ea  - standing R/L hip flexion 3# 1 x 15 ea  - standing B gastroc stretch on slant board level 3 3 x 30 sec holds    X 50 minutes  - NuStep level 5 (LEs only) x 7   Minutes  - hooklying B hip adduction ball squeeze 25x 3 sec holds  - supine bridges 2 x 10 reps   - supine R/L LTR 10x ea 5 sec holds ea  - sidelying R/L clams with YTB above knees 10x ea  - standing B heel raises 1# 2 x 10  - standing R/L hip abduction 1# 2 x 10 ea  - standing R/L hip extension 1# 2 x 10 ea  - standing B gastroc stretch on slant board level 2 3 x 30 sec holds   - shuttle machine B knee ext/flx 4 bands 2 x 10     Manual Therapy        Therapeutic Activity        Modalities                    Goals:  1- Pt will be I with maintenance and progression of HEP - MET  2- Pt will demo increase in BLE strength to 4+/5 or better to ease ability for pt to ambulate community distances - NOT MET  3- Pt will report abolishment of hip and back pain with transfers and bed mobility -   4- Pt will demo increase in lumbar ROM to min loss or better to ease ability for pt to bend and perform ADLs - MET    Plan:  pt to continue HEP at this time    Charges:  Ex 3   Total Timed Treatment: 40 min  Total Treatment Time: 40 min

## 2024-08-20 ENCOUNTER — TELEPHONE (OUTPATIENT)
Dept: FAMILY MEDICINE CLINIC | Facility: CLINIC | Age: 79
End: 2024-08-20

## 2024-08-20 DIAGNOSIS — H53.9 VISION DISORDER: Primary | ICD-10-CM

## 2024-08-20 NOTE — TELEPHONE ENCOUNTER
Patient is requesting referral. She did not have the address or fax#/phone # for the office.    Name of specialist and specialty department: Al[facility] (Ophthalmology)     Reason for visit with the specialist: Eye exam    Address of the specialist office:     Appointment date:     hospitals informed patient the turnaround time for referral is 5-7 business days.  Patient was informed to check their [employer] account for referral status.

## 2024-08-20 NOTE — TELEPHONE ENCOUNTER
Good afternoon    Please review pended referral for [facility], Dr Alessio Reyes and add DX codes.    Thank you    Candice  [facility]

## 2024-08-20 NOTE — TELEPHONE ENCOUNTER
Patient called back to provide appointment date 8/26 because last call was dropped    FAX: [phone]

## 2024-08-26 ENCOUNTER — TELEPHONE (OUTPATIENT)
Dept: CASE MANAGEMENT | Age: 79
End: 2024-08-26

## 2024-08-26 NOTE — TELEPHONE ENCOUNTER
Physician Practice Division          Jesi Ashford   [address] [facility] [place] [address]    8/26/24     *REFERRAL FORM TO BE PRESENTED AT TIME OF APPOINTMENT*        Name: Jesi Ashford   YOB: 1945  Home Phone: [phone] (home)  Work Phone: There is no work phone number on file.  [id number]  Primary Care Provider: Kenya Plascencia MD (TEL: [phone])  Referring Provider: KENYA PLASCENCIA (TEL: [phone])    Referral Information  Referral ID #:  [id number]  Referral Status:  AUTHORIZED  Date Authorized:  08/21/2024 // EXPIRATION DATE: 08/21/2025     Referred to: RADHA SAMANIEGO (TEL: [phone]) [address] 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  NOTE:  This referral is subject to verification of member eligibility.   This authorization DOES NOT GUARANTEE PAYMENT FOR NON COVERED SERVICES.  Members should contact their health plan prior to receiving this service to verify that it is a covered benefit.  Further services require authorization by the Referring or Primary Care Physician.

## 2024-08-27 RX ORDER — NAPROXEN 500 MG/1
500 TABLET ORAL 2 TIMES DAILY WITH MEALS
Qty: 60 TABLET | Refills: 1 | Status: SHIPPED | OUTPATIENT
Start: 2024-08-27

## 2024-08-27 NOTE — TELEPHONE ENCOUNTER
Refill passed per [facility] protocol.  Requested Prescriptions   Pending Prescriptions Disp Refills    NAPROXEN 500 MG Oral Tab [Pharmacy Med Name: NAPROXEN 500 MG Tablet] 60 tablet 11     Sig: TOME 1 TABLETA 2 VECES AL TONY CON COMIDAS       Non-Narcotic Pain Medication Protocol Passed - 8/26/2024 11:44 AM        Passed - In person appointment or virtual visit in the past 6 mos or appointment in next 3 mos     Recent Outpatient Visits              2 weeks ago     [facility]    [facility] Visit    2 weeks ago     [facility]    Office Visit    3 weeks ago     [facility]    Office Visit    1 month ago     [facility]    Office Visit    1 month ago Myalgia    [facility]    [facility] Visit          Future Appointments         Provider Department Appt Notes    In 3 weeks Kenya Plascencia MD [facility], [facility]                       Future Appointments         Provider Department Appt Notes    In 3 weeks Kenya Plascencia MD [facility], [facility]          Recent Outpatient Visits              2 weeks ago     [facility]    Office Visit    2 weeks ago     [facility]    Office Visit    3 weeks ago     [facility]    Office Visit    1 month ago     [facility]    Office Visit    1 month ago Myalgia    [facility]    Office Visit

## 2024-08-28 ENCOUNTER — TELEPHONE (OUTPATIENT)
Dept: FAMILY MEDICINE CLINIC | Facility: CLINIC | Age: 79
End: 2024-08-28

## 2024-08-28 NOTE — TELEPHONE ENCOUNTER
Patient calling to state Dr. Reyes did not  receive referral, asking to fax to  [phone], stated has visit on 08/29/24.

## 2024-09-18 ENCOUNTER — OFFICE VISIT (OUTPATIENT)
Dept: FAMILY MEDICINE CLINIC | Facility: CLINIC | Age: 79
End: 2024-09-18

## 2024-09-18 VITALS
HEART RATE: 64 BPM | BODY MASS INDEX: 29.29 KG/M2 | HEIGHT: 65 IN | WEIGHT: 175.81 LBS | SYSTOLIC BLOOD PRESSURE: 106 MMHG | DIASTOLIC BLOOD PRESSURE: 64 MMHG | OXYGEN SATURATION: 98 %

## 2024-09-18 DIAGNOSIS — L98.9 SKIN LESION: Primary | ICD-10-CM

## 2024-09-18 PROCEDURE — 3074F SYST BP LT 130 MM HG: CPT | Performed by: FAMILY MEDICINE

## 2024-09-18 PROCEDURE — 3008F BODY MASS INDEX DOCD: CPT | Performed by: FAMILY MEDICINE

## 2024-09-18 PROCEDURE — 1160F RVW MEDS BY RX/DR IN RCRD: CPT | Performed by: FAMILY MEDICINE

## 2024-09-18 PROCEDURE — 3078F DIAST BP <80 MM HG: CPT | Performed by: FAMILY MEDICINE

## 2024-09-18 PROCEDURE — 99213 OFFICE O/P EST LOW 20 MIN: CPT | Performed by: FAMILY MEDICINE

## 2024-09-18 PROCEDURE — 1126F AMNT PAIN NOTED NONE PRSNT: CPT | Performed by: FAMILY MEDICINE

## 2024-09-18 PROCEDURE — 1159F MED LIST DOCD IN RCRD: CPT | Performed by: FAMILY MEDICINE

## 2024-09-18 RX ORDER — MONTELUKAST SODIUM 10 MG/1
TABLET ORAL
COMMUNITY
Start: 2024-08-08

## 2024-09-18 RX ORDER — LORATADINE 10 MG/1
10 TABLET ORAL DAILY
COMMUNITY

## 2024-09-18 RX ORDER — AMOXICILLIN 875 MG
875 TABLET ORAL 2 TIMES DAILY
Qty: 20 TABLET | Refills: 0 | Status: SHIPPED | OUTPATIENT
Start: 2024-09-18 | End: 2024-09-28

## 2024-09-18 RX ORDER — LISINOPRIL 20 MG/1
TABLET ORAL
COMMUNITY
Start: 2024-04-08

## 2024-09-18 RX ORDER — DORZOLAMIDE HYDROCHLORIDE AND TIMOLOL MALEATE 20; 5 MG/ML; MG/ML
SOLUTION/ DROPS OPHTHALMIC
COMMUNITY
Start: 2024-09-07

## 2024-09-18 NOTE — PROGRESS NOTES
Subjective:   Patient ID: Jesi Ashford is a 79 year old female.    HPI  Patient with postnasal drip and has small amount of blood there this am   She is not coughing   She has this postnasal drip for weeks at least '  Also has some new skin growth on the right cheek   Otherwise doing well   History/Other:   Review of Systems  Constitutional: Negative.  Negative for activity change, appetite change, diaphoresis and fatigue.   Heent see hpi   Respiratory: Negative.  Negative for apnea, cough, chest tightness and shortness of breath.    Cardiovascular: Negative.  Negative for chest pain, palpitations and leg swelling.   Skin:see hpi            Current Outpatient Medications   Medication Sig Dispense Refill    dorzolamide-timolol 2-0.5 % Ophthalmic Solution       lisinopril 20 MG Oral Tab       montelukast 10 MG Oral Tab       loratadine 10 MG Oral Tab Take 1 tablet (10 mg total) by mouth daily.      amoxicillin 875 MG Oral Tab Take 1 tablet (875 mg total) by mouth 2 (two) times daily for 10 days. 20 tablet 0    naproxen 500 MG Oral Tab Take 1 tablet (500 mg total) by mouth 2 (two) times daily with meals. 60 tablet 1     Allergies:  Allergies   Allergen Reactions    Seasonal OTHER (SEE COMMENTS)     Runny eyes       Objective:   Physical Exam  Constitutional:       Appearance: She is well-developed.   HENT:      Mouth/Throat:      Pharynx: No oropharyngeal exudate or posterior oropharyngeal erythema.   Cardiovascular:      Rate and Rhythm: Normal rate and regular rhythm.      Heart sounds: Normal heart sounds.   Pulmonary:      Effort: Pulmonary effort is normal.      Breath sounds: Normal breath sounds.   Neurological:      Mental Status: She is alert.      Deep Tendon Reflexes: Reflexes are normal and symmetric.         Assessment & Plan:   1. Skin lesion    Needs to see dermatologist   2. Sinusitis start amoxil   F/u in 1 month     No orders of the defined types were placed in this encounter.      Meds This  Visit:  Requested Prescriptions     Signed Prescriptions Disp Refills    amoxicillin 875 MG Oral Tab 20 tablet 0     Sig: Take 1 tablet (875 mg total) by mouth 2 (two) times daily for 10 days.       Imaging & Referrals:  DERM - INTERNAL

## 2024-09-25 ENCOUNTER — TELEPHONE (OUTPATIENT)
Dept: FAMILY MEDICINE CLINIC | Facility: CLINIC | Age: 79
End: 2024-09-25

## 2024-09-25 NOTE — TELEPHONE ENCOUNTER
Reached patient for medication adherence consult via  ID# [id number]. Patient overdue for refill on lisinopril per insurance report.    Patient reports that she is taking the medication as prescribed. He/she reports tolerating the medication well. She endorses the need for refill on lisinopril. Will call patient's pharmacy for refill. Patient also requests a prescription for gabapentin. Will pend order for gabapentin 200 mg nightly as patient has previously taken.     Provided education on importance of adherence. Patient questions and concerns addressed.

## 2024-09-26 RX ORDER — GABAPENTIN 100 MG/1
200 CAPSULE ORAL NIGHTLY
Qty: 180 CAPSULE | Refills: 0 | Status: SHIPPED | OUTPATIENT
Start: 2024-09-26

## 2024-09-29 RX ORDER — LISINOPRIL 20 MG/1
20 TABLET ORAL DAILY
Qty: 90 TABLET | Refills: 0 | Status: SHIPPED | OUTPATIENT
Start: 2024-09-29

## 2024-09-29 NOTE — TELEPHONE ENCOUNTER
Lisinopril Oral Tablet 20 MG          Will file in chart as: LISINOPRIL 20 MG Oral Tab    Sig: TOME 1 TABLETA CADA TONY    Disp: 90 tablet    Refills: 3    Start: 9/26/2024    Class: Normal    Non-formulary    Last ordered: 1 week ago (9/18/2024) by Reported External/Patient    Last refill: 4/11/2024    Rx #: [id number]    Hypertension Medications Protocol [id number]/26/2024 12:14 PM   Protocol Details CMP or BMP in past 12 months    Last BP reading less than 140/90    In person appointment or virtual visit in the past 12 mos or appointment in next 3 mos    EGFRCR or GFRNAA > 50      LOV 9/18/24   RTC 1 month  Filled 4/8/24 by PCP   No future appointments.

## 2024-10-15 ENCOUNTER — OFFICE VISIT (OUTPATIENT)
Dept: DERMATOLOGY CLINIC | Facility: CLINIC | Age: 79
End: 2024-10-15

## 2024-10-15 DIAGNOSIS — L30.4 INTERTRIGO: Primary | ICD-10-CM

## 2024-10-15 DIAGNOSIS — L82.1 SEBORRHEIC KERATOSES: ICD-10-CM

## 2024-10-15 DIAGNOSIS — L81.4 LENTIGINES: ICD-10-CM

## 2024-10-15 PROCEDURE — 1159F MED LIST DOCD IN RCRD: CPT | Performed by: STUDENT IN AN ORGANIZED HEALTH CARE EDUCATION/TRAINING PROGRAM

## 2024-10-15 PROCEDURE — 99214 OFFICE O/P EST MOD 30 MIN: CPT | Performed by: STUDENT IN AN ORGANIZED HEALTH CARE EDUCATION/TRAINING PROGRAM

## 2024-10-15 PROCEDURE — 1160F RVW MEDS BY RX/DR IN RCRD: CPT | Performed by: STUDENT IN AN ORGANIZED HEALTH CARE EDUCATION/TRAINING PROGRAM

## 2024-10-15 RX ORDER — LATANOPROST 50 UG/ML
SOLUTION/ DROPS OPHTHALMIC
COMMUNITY
Start: 2024-10-01

## 2024-10-15 RX ORDER — NYSTATIN AND TRIAMCINOLONE ACETONIDE 100000; 1 [USP'U]/G; MG/G
OINTMENT TOPICAL
Qty: 60 G | Refills: 3 | Status: SHIPPED | OUTPATIENT
Start: 2024-10-15

## 2024-10-15 RX ORDER — NYSTATIN 100000 [USP'U]/G
POWDER TOPICAL
Qty: 60 G | Refills: 6 | Status: SHIPPED | OUTPATIENT
Start: 2024-10-15

## 2024-10-15 NOTE — PROGRESS NOTES
October 15, 2024    Established patient     Referred by: Kenya Plascencia MD     CHIEF COMPLAINT: Lesions, Intertrigo f/u, Dry skin    HISTORY OF PRESENT ILLNESS: .    1. Growths   Location: Right Lower Cheek   Duration: 7 months   Signs and symptoms: Raised dry, brown growth - painful and itchy at first - new growths appearing in area   Current treatment: Iodine   Past treatments None    2. Intertrigo   Location: Under bilateral breasts, groin, abdominal folds   Duration: Chronic   Signs and symptoms: Itchy, dry, raw skin with numerous brown stuck on papules   Condition Update: Temporary improvement with ointment   Current treatment: None   Past treatments: nystatin-triamcinolone 100,000-0.1 Units/g-% External Ointment     3. Dry Plaques   Location: Vagina, scattered on arms   Duration: Chronic   Signs and symptoms: Itchy, bumpy, dry patches   Current treatment: None   Past treatments: None     DERM HISTORY:  History of skin cancer: No  History of chronic skin disease/condition: Yes    History/Other:    REVIEW OF SYSTEMS:  Constitutional: Denies fever, chills, unintentional weight loss.   Skin as per HPI    PAST MEDICAL HISTORY:  Past Medical History:    Acute vulvitis    Allergic conjunctivitis    Cataract    Cataract of both eyes    Cataract of both eyes    Chronic right shoulder pain    Chronic right-sided headaches    Chronic right-sided headaches    Chronic upper back pain    Dry eye syndrome    Dry eye syndrome of both eyes    Dry eye syndrome of both eyes    Essential hypertension    External hemorrhoids    External hemorrhoids    Foraminal stenosis of cervical region    Foraminal stenosis of cervical region    Glaucoma    Hiatal hernia    Hiatal hernia    High blood pressure    Melanosis coli    NSAID induced gastritis    NSAID induced gastritis    NSAID induced gastritis    Osteoarthritis    knee, hips    Psychophysiological insomnia    Redundant colon    Redundant colon    Redundant colon    Shingles     Sigmoid diverticulosis    Sigmoid diverticulosis    Sigmoid diverticulosis    Smokers' cough (HCC)    Smokers' cough (HCC)    Tendinopathy of rotator cuff    Tinnitus of both ears    Trigger point of neck    Trigger point of neck    Trigger point of neck    Trigger point of neck    Visual impairment    glasses       Medications  Current Outpatient Medications   Medication Sig Dispense Refill    latanoprost 0.005 % Ophthalmic Solution       gabapentin 100 MG Oral Cap Take 2 capsules (200 mg total) by mouth nightly. 180 capsule 0    lisinopril 20 MG Oral Tab TOME 1 TABLETA CADA TONY 90 tablet 0    dorzolamide-timolol 2-0.5 % Ophthalmic Solution       montelukast 10 MG Oral Tab       loratadine 10 MG Oral Tab Take 1 tablet (10 mg total) by mouth daily.      naproxen 500 MG Oral Tab Take 1 tablet (500 mg total) by mouth 2 (two) times daily with meals. 60 tablet 1       Objective:    PHYSICAL EXAM:  General: awake, alert, no acute distress  Skin: Skin exam was performed today including the following: face. Pertinent findings include:   - with stellate brown macules  - with inflamed brown papules    ASSESSMENT & PLAN:  Pathophysiology of diagnoses discussed with patient.  Therapeutic options reviewed. Risks, benefits, and alternatives discussed with patient. Instructions reviewed at length.    #Inflamed seborrheic keratosis  - Reassured regarding benign nature of lesion   - Cryotherapy today. Medically necessary as lesion inflamed and irritated.    - Cryosurgery of non-malignant lesion(s)  - Risks, benefits, alternatives and personnel required for cryosurgery reviewed with patient. Pt verbalizes understanding and wishes to proceed.   - Cryosurgery performed with Liquid Nitrogen via cryostat spray gun to ISK. 2 lesion(s) treated.   - Patient tolerated well and wound care discussed.     #Lentigines  - Discussed benign appearance and provided reassurance. No treatment but observation at this time. Follow-up for concerning  physical changes or new symptoms.  - Recommend sun protection with spf 30 or higher, sun protective clothing such as wide brimmed hats and long sleeves. Recommend avoiding midday sun (10 am- 3 pm).     #intertrigo, flaring  - Nystatin powder twice daily  to affected area  - Continue nystatin-tac to affected areas      Return to clinic: 3 months or sooner if something concerning arises     Deni Carranza MD

## 2024-10-22 RX ORDER — NAPROXEN 500 MG/1
500 TABLET ORAL 2 TIMES DAILY WITH MEALS
Qty: 180 TABLET | Refills: 0 | Status: SHIPPED | OUTPATIENT
Start: 2024-10-22

## 2024-10-22 NOTE — TELEPHONE ENCOUNTER
Refill passed per [facility] protocol.  Requested Prescriptions   Pending Prescriptions Disp Refills    NAPROXEN 500 MG Oral Tab [Pharmacy Med Name: Naproxen Oral Tablet 500 MG] 120 tablet 5     Sig: TOME 1 TABLETA DOS VECES AL TONY CON COMIDAS       Non-Narcotic Pain Medication Protocol Passed - 10/22/2024  8:53 AM        Passed - In person appointment or virtual visit in the past 6 mos or appointment in next 3 mos     Recent Outpatient Visits              1 week ago [facility]    Office Visit    1 month ago [facility], Kenya Kurtz MD    Office Visit    2 months ago     [facility]    Office [facility]    2 months ago     [facility]    Office Visit    2 months ago     [facility]    [facility] Visit          Future Appointments         Provider Department Appt Notes    In 3 weeks [facility] Plascencia MD [facility]                        Recent Outpatient Visits              1 week ago [facility] [place] [facility] [facility]    Office Visit    1 month ago [facility], Kenya Kurtz MD    Office Visit    2 months ago     [facility]    Office Visit    2 months ago     [facility]    Office Visit    2 months ago     [facility]    [facility] Visit          Future Appointments         Provider Department Appt Notes    In 3 weeks Kenya Plascencia MD [facility]

## 2024-11-13 ENCOUNTER — OFFICE VISIT (OUTPATIENT)
Dept: FAMILY MEDICINE CLINIC | Facility: CLINIC | Age: 79
End: 2024-11-13

## 2024-11-13 VITALS
HEART RATE: 65 BPM | WEIGHT: 177 LBS | SYSTOLIC BLOOD PRESSURE: 129 MMHG | BODY MASS INDEX: 29.49 KG/M2 | DIASTOLIC BLOOD PRESSURE: 66 MMHG | TEMPERATURE: 97 F | HEIGHT: 65 IN | OXYGEN SATURATION: 97 % | RESPIRATION RATE: 20 BRPM

## 2024-11-13 DIAGNOSIS — M54.59 OTHER LOW BACK PAIN: Primary | ICD-10-CM

## 2024-11-13 DIAGNOSIS — Z78.0 MENOPAUSE: ICD-10-CM

## 2024-11-13 PROCEDURE — 97814 ACUP 1/> W/ESTIM EA ADDL 15: CPT | Performed by: FAMILY MEDICINE

## 2024-11-13 PROCEDURE — 1159F MED LIST DOCD IN RCRD: CPT | Performed by: FAMILY MEDICINE

## 2024-11-13 PROCEDURE — 1160F RVW MEDS BY RX/DR IN RCRD: CPT | Performed by: FAMILY MEDICINE

## 2024-11-13 PROCEDURE — 97813 ACUP 1/> W/ESTIM 1ST 15 MIN: CPT | Performed by: FAMILY MEDICINE

## 2024-11-13 PROCEDURE — 3078F DIAST BP <80 MM HG: CPT | Performed by: FAMILY MEDICINE

## 2024-11-13 PROCEDURE — 3074F SYST BP LT 130 MM HG: CPT | Performed by: FAMILY MEDICINE

## 2024-11-13 PROCEDURE — 3008F BODY MASS INDEX DOCD: CPT | Performed by: FAMILY MEDICINE

## 2024-11-13 PROCEDURE — 99213 OFFICE O/P EST LOW 20 MIN: CPT | Performed by: FAMILY MEDICINE

## 2024-11-13 NOTE — PROGRESS NOTES
Subjective:   Patient ID: Jesi Ashford is a 79 year old female.    HPI  Patient here for low back pain   Fell recently and started having low back pain more pronounced on the left side   History/Other:   Review of Systems   Constitutional: Negative.  Negative for fatigue.   Respiratory:  Negative for cough, chest tightness and shortness of breath.    Cardiovascular: Negative.    Musculoskeletal:  Positive for back pain and gait problem. Negative for joint swelling and myalgias.   Neurological:  Negative for weakness and numbness.         Current Outpatient Medications   Medication Sig Dispense Refill    naproxen 500 MG Oral Tab Take 1 tablet (500 mg total) by mouth 2 (two) times daily with meals. 180 tablet 0    latanoprost 0.005 % Ophthalmic Solution       nystatin 384008 UNIT/GM External Powder Use twice daily  to chest and groin 60 g 6    nystatin-triamcinolone 100,000-0.1 Units/g-% External Ointment Apply twice daily  Monday-Friday to affected areas of rash. 60 g 3    lisinopril 20 MG Oral Tab TOME 1 TABLETA CADA TONY 90 tablet 0    gabapentin 100 MG Oral Cap Take 2 capsules (200 mg total) by mouth nightly. 180 capsule 0    dorzolamide-timolol 2-0.5 % Ophthalmic Solution       montelukast 10 MG Oral Tab       loratadine 10 MG Oral Tab Take 1 tablet (10 mg total) by mouth daily.       Allergies:Allergies[1]    Objective:   Physical Exam  Constitutional:       Appearance: She is well-developed.   Cardiovascular:      Rate and Rhythm: Normal rate and regular rhythm.      Heart sounds: Normal heart sounds.   Pulmonary:      Effort: Pulmonary effort is normal. No respiratory distress.      Breath sounds: Normal breath sounds. No wheezing or rales.   Abdominal:      General: There is no distension.      Palpations: Abdomen is soft.      Tenderness: There is no abdominal tenderness.   Musculoskeletal:         General: Tenderness present. No deformity.      Lumbar back: Spasms and tenderness present. Decreased  range of motion.   Neurological:      Mental Status: She is alert and oriented to person, place, and time.      Motor: No abnormal muscle tone.      Deep Tendon Reflexes: Reflexes are normal and symmetric.         Assessment & Plan:     ICD-10-CM    1. Other low back pain  M54.59       Felt better after acu treatment  '    No orders of the defined types were placed in this encounter.      Meds This Visit:  Requested Prescriptions      No prescriptions requested or ordered in this encounter       Imaging & Referrals:  None         [1]   Allergies  Allergen Reactions    Seasonal OTHER (SEE COMMENTS)     Runny eyes

## 2024-11-13 NOTE — PROCEDURES
Patient tolerated procedure well in excess of 30 minutes was spent with patient   There was no complications all needles were removed.   Patient was advised to rest today and f/u in 1-2 weeks   Pens treatment

## 2024-12-12 ENCOUNTER — OFFICE VISIT (OUTPATIENT)
Dept: FAMILY MEDICINE CLINIC | Facility: CLINIC | Age: 79
End: 2024-12-12

## 2024-12-12 VITALS
HEART RATE: 58 BPM | WEIGHT: 177 LBS | SYSTOLIC BLOOD PRESSURE: 112 MMHG | BODY MASS INDEX: 29.49 KG/M2 | OXYGEN SATURATION: 99 % | DIASTOLIC BLOOD PRESSURE: 66 MMHG | RESPIRATION RATE: 20 BRPM | TEMPERATURE: 98 F | HEIGHT: 65 IN

## 2024-12-12 DIAGNOSIS — M54.59 OTHER LOW BACK PAIN: Primary | ICD-10-CM

## 2024-12-12 PROCEDURE — 3008F BODY MASS INDEX DOCD: CPT | Performed by: FAMILY MEDICINE

## 2024-12-12 PROCEDURE — 1159F MED LIST DOCD IN RCRD: CPT | Performed by: FAMILY MEDICINE

## 2024-12-12 PROCEDURE — 3074F SYST BP LT 130 MM HG: CPT | Performed by: FAMILY MEDICINE

## 2024-12-12 PROCEDURE — 1160F RVW MEDS BY RX/DR IN RCRD: CPT | Performed by: FAMILY MEDICINE

## 2024-12-12 PROCEDURE — 1125F AMNT PAIN NOTED PAIN PRSNT: CPT | Performed by: FAMILY MEDICINE

## 2024-12-12 PROCEDURE — 3078F DIAST BP <80 MM HG: CPT | Performed by: FAMILY MEDICINE

## 2024-12-12 NOTE — PROGRESS NOTES
Subjective:   Patient ID: Jesi Ashford is a 79 year old female.    HPI  Here for f/u low back pain   Felt better for few days after acu then pain started again   History/Other:   Review of Systems   Constitutional: Negative.  Negative for fatigue.   Respiratory:  Negative for cough, chest tightness and shortness of breath.    Cardiovascular: Negative.    Musculoskeletal:  Positive for back pain and gait problem. Negative for joint swelling and myalgias.   Neurological:  Negative for weakness and numbness.         Current Outpatient Medications   Medication Sig Dispense Refill    naproxen 500 MG Oral Tab Take 1 tablet (500 mg total) by mouth 2 (two) times daily with meals. 180 tablet 0    latanoprost 0.005 % Ophthalmic Solution       nystatin [id number] UNIT/GM External Powder Use twice daily  to chest and groin 60 g 6    nystatin-triamcinolone 100,000-0.1 Units/g-% External Ointment Apply twice daily  Monday-Friday to affected areas of rash. 60 g 3    lisinopril 20 MG Oral Tab TOME 1 TABLETA CADA TONY 90 tablet 0    gabapentin 100 MG Oral Cap Take 2 capsules (200 mg total) by mouth nightly. 180 capsule 0    dorzolamide-timolol 2-0.5 % Ophthalmic Solution       montelukast 10 MG Oral Tab       loratadine 10 MG Oral Tab Take 1 tablet (10 mg total) by mouth daily.       Allergies:Allergies[1]    Objective:   Physical Exam  Constitutional:       Appearance: She is well-developed.   Cardiovascular:      Rate and Rhythm: Normal rate and regular rhythm.      Heart sounds: Normal heart sounds.   Pulmonary:      Effort: Pulmonary effort is normal. No respiratory distress.      Breath sounds: Normal breath sounds. No wheezing or rales.   Abdominal:      General: There is no distension.      Palpations: Abdomen is soft.      Tenderness: There is no abdominal tenderness.   Musculoskeletal:         General: Tenderness present. No deformity.      Lumbar back: Spasms and tenderness present. Decreased range of motion.    Neurological:      Mental Status: She is alert and oriented to person, place, and time.      Motor: No abnormal muscle tone.      Coordination: Coordination normal.      Deep Tendon Reflexes: Reflexes are normal and symmetric. Reflexes normal.         Assessment & Plan:     ICD-10-CM    1. Other low back pain  M54.59       Felt better after acu      No orders of the defined types were placed in this encounter.      Meds This Visit:  Requested Prescriptions      No prescriptions requested or ordered in this encounter       Imaging & Referrals:  None         [1]   Allergies  Allergen Reactions    Seasonal OTHER (SEE COMMENTS)     Runny eyes

## 2024-12-18 ENCOUNTER — OFFICE VISIT (OUTPATIENT)
Dept: FAMILY MEDICINE CLINIC | Facility: CLINIC | Age: 79
End: 2024-12-18

## 2024-12-18 VITALS
OXYGEN SATURATION: 96 % | HEART RATE: 60 BPM | SYSTOLIC BLOOD PRESSURE: 127 MMHG | TEMPERATURE: 98 F | DIASTOLIC BLOOD PRESSURE: 69 MMHG | BODY MASS INDEX: 29.66 KG/M2 | WEIGHT: 178 LBS | RESPIRATION RATE: 20 BRPM | HEIGHT: 65 IN

## 2024-12-18 DIAGNOSIS — M54.59 OTHER LOW BACK PAIN: Primary | ICD-10-CM

## 2024-12-18 PROCEDURE — 3074F SYST BP LT 130 MM HG: CPT | Performed by: FAMILY MEDICINE

## 2024-12-18 PROCEDURE — 1159F MED LIST DOCD IN RCRD: CPT | Performed by: FAMILY MEDICINE

## 2024-12-18 PROCEDURE — 3008F BODY MASS INDEX DOCD: CPT | Performed by: FAMILY MEDICINE

## 2024-12-18 PROCEDURE — 1160F RVW MEDS BY RX/DR IN RCRD: CPT | Performed by: FAMILY MEDICINE

## 2024-12-18 PROCEDURE — 1125F AMNT PAIN NOTED PAIN PRSNT: CPT | Performed by: FAMILY MEDICINE

## 2024-12-18 PROCEDURE — 3078F DIAST BP <80 MM HG: CPT | Performed by: FAMILY MEDICINE

## 2024-12-18 PROCEDURE — 97810 ACUP 1/> WO ESTIM 1ST 15 MIN: CPT | Performed by: FAMILY MEDICINE

## 2024-12-18 PROCEDURE — 97811 ACUP 1/> W/O ESTIM EA ADD 15: CPT | Performed by: FAMILY MEDICINE

## 2024-12-18 NOTE — PROGRESS NOTES
Subjective:   Patient ID: Jesi Ashford is a 79 year old female.    HPI  States that feeling better then before   Pain persists but imporved   History/Other:   Review of Systems   Constitutional: Negative.  Negative for fatigue.   Respiratory:  Negative for cough, chest tightness and shortness of breath.    Cardiovascular: Negative.    Musculoskeletal:  Positive for back pain and gait problem. Negative for joint swelling and myalgias.   Neurological:  Negative for weakness and numbness.         Current Outpatient Medications   Medication Sig Dispense Refill    naproxen 500 MG Oral Tab Take 1 tablet (500 mg total) by mouth 2 (two) times daily with meals. 180 tablet 0    latanoprost 0.005 % Ophthalmic Solution       nystatin 763025 UNIT/GM External Powder Use twice daily  to chest and groin 60 g 6    nystatin-triamcinolone 100,000-0.1 Units/g-% External Ointment Apply twice daily  Monday-Friday to affected areas of rash. 60 g 3    lisinopril 20 MG Oral Tab TOME 1 TABLETA CADA TONY 90 tablet 0    gabapentin 100 MG Oral Cap Take 2 capsules (200 mg total) by mouth nightly. 180 capsule 0    dorzolamide-timolol 2-0.5 % Ophthalmic Solution       montelukast 10 MG Oral Tab       loratadine 10 MG Oral Tab Take 1 tablet (10 mg total) by mouth daily.       Allergies:Allergies[1]    Objective:   Physical Exam  Constitutional:       Appearance: She is well-developed.   Cardiovascular:      Rate and Rhythm: Normal rate and regular rhythm.      Heart sounds: Normal heart sounds.   Pulmonary:      Effort: Pulmonary effort is normal. No respiratory distress.      Breath sounds: Normal breath sounds. No wheezing or rales.   Abdominal:      General: There is no distension.      Palpations: Abdomen is soft.      Tenderness: There is no abdominal tenderness.   Musculoskeletal:         General: Tenderness present. No deformity.      Lumbar back: Spasms and tenderness present. Decreased range of motion.   Neurological:      Mental  Status: She is alert.      Motor: No abnormal muscle tone.      Deep Tendon Reflexes: Reflexes are normal and symmetric.         Assessment & Plan:   1. Other low back pain    Cpm   I'm[porving   Continue stretching     Orders Placed This Encounter   Procedures    ACUPUNCT W/O STIMUL 15 MIN    ACUPUNCT W/O STIMUL ADDL 15M       Meds This Visit:  Requested Prescriptions      No prescriptions requested or ordered in this encounter       Imaging & Referrals:  None         [1]   Allergies  Allergen Reactions    Seasonal OTHER (SEE COMMENTS)     Runny eyes

## 2024-12-18 NOTE — PROCEDURES
Patient tolerated procedure well in excess of 30 minutes was spent with patient   There was no complications all needles were removed.   Patient was advised to rest today and f/u in 1-2 weeks  Brandyn   Ear points   Bl 1, bl 2

## 2024-12-23 RX ORDER — LISINOPRIL 20 MG/1
20 TABLET ORAL DAILY
Qty: 90 TABLET | Refills: 3 | Status: SHIPPED | OUTPATIENT
Start: 2024-12-23

## 2024-12-23 NOTE — TELEPHONE ENCOUNTER
Refill Passed Per Protocol    Requested Prescriptions   Pending Prescriptions Disp Refills    LISINOPRIL 20 MG Oral Tab [Pharmacy Med Name: Lisinopril Oral Tablet 20 MG] 90 tablet 3     Sig: TOME 1 TABLETA LAMONT TONY       Hypertension Medications Protocol Passed - 12/23/2024  5:20 PM        Passed - CMP or BMP in past 12 months        Passed - Last BP reading less than 140/90     BP Readings from Last 1 Encounters:   12/18/24 127/69               Passed - In person appointment or virtual visit in the past 12 mos or appointment in next 3 mos     Recent Outpatient Visits              5 days ago [facility]    1 week ago [facility] low [facility] pain    [facility]    1 month ago Other low back pain    [facility]    2 months ago Intertrigo    [facility] [place]    Office Visit    3 months ago Skin lesion    [facility]          Future Appointments         Provider Department Appt Notes    In [facility] weeks [facility]     In 1 month [facility]                     Passed - EGFRCR or GFRNAA > 50     GFR Evaluation  EGFRCR: 71 , resulted on 2/27/2024               Future Appointments         Provider Department Appt Notes    In 3 weeks [facility]     In 1 month Kenya Plascencia MD [facility]           Recent Outpatient Visits              5 days ago Other low back pain    [facility] [place]  [place], Kenya Kurtz MD    Office Visit    1 week ago Other low back pain    [facility], [facility], Kenya Kurtz MD    Office Visit    1 month ago Other low back pain    [facility], Kenya Kurtz MD    Office Visit    2 months ago Intertrigo    [facility], [facility], Deni Chen MD    Office Visit    3 months ago Skin lesion    [facility], [facility], Kenya Kurtz MD    Office Visit

## 2025-01-13 RX ORDER — NAPROXEN 500 MG/1
500 TABLET ORAL 2 TIMES DAILY WITH MEALS
Qty: 180 TABLET | Refills: 1 | Status: SHIPPED | OUTPATIENT
Start: 2025-01-13

## 2025-01-13 NOTE — TELEPHONE ENCOUNTER
Refill passed per [facility] protocol.    Requested Prescriptions   Pending Prescriptions Disp Refills    NAPROXEN 500 MG Oral Tab [Pharmacy Med Name: Naproxen Oral Tablet 500 MG] 180 tablet 3     Sig: TOME 1 TABLETA DOS VECES AL TONY CON COMIDAS       Non-Narcotic Pain Medication Protocol Passed - 1/13/2025  9:02 AM        Passed - In person appointment or virtual visit in the past 6 mos or appointment in next 3 mos     Recent Outpatient Visits              3 weeks ago [facility]    1 month ago [facility] low back pain    [facility]    [facility]    2 months ago Other low back pain    [facility]    [facility]    3 months ago [facility]trigo    [facility]    Office Visit    3 months ago [facility] lesion    [facility]          Future Appointments         Provider Department Appt Notes    In 3 days [facility]     In 1 week Kenya Plascencia MD [facility]                     Passed - Medication is active on med list           Future Appointments         Provider Department Appt Notes    In 3 days [facility]     In 1 week [facility]           Recent Outpatient Visits              3 weeks ago Other low back pain    [facility] Kurtz MD    Office Visit    1 month ago Other low back pain    [facility]    Office Visit    2 months ago Other low back pain    [facility], Kenya Kurtz MD    Office Visit    3 months ago Intertrigo    [facility], Deni Chen MD    Office Visit    3 months ago Skin lesion    [facility], [facility], Kenya Krutz MD    Office Visit

## 2025-01-16 ENCOUNTER — OFFICE VISIT (OUTPATIENT)
Dept: FAMILY MEDICINE CLINIC | Facility: CLINIC | Age: 80
End: 2025-01-16

## 2025-01-16 VITALS
HEIGHT: 65 IN | RESPIRATION RATE: 20 BRPM | TEMPERATURE: 97 F | HEART RATE: 72 BPM | OXYGEN SATURATION: 98 % | WEIGHT: 181 LBS | BODY MASS INDEX: 30.16 KG/M2 | SYSTOLIC BLOOD PRESSURE: 115 MMHG | DIASTOLIC BLOOD PRESSURE: 49 MMHG

## 2025-01-16 DIAGNOSIS — M54.59 OTHER LOW BACK PAIN: ICD-10-CM

## 2025-01-16 DIAGNOSIS — M25.561 PAIN IN BOTH KNEES, UNSPECIFIED CHRONICITY: Primary | ICD-10-CM

## 2025-01-16 DIAGNOSIS — M25.562 PAIN IN BOTH KNEES, UNSPECIFIED CHRONICITY: Primary | ICD-10-CM

## 2025-01-16 PROCEDURE — 97811 ACUP 1/> W/O ESTIM EA ADD 15: CPT | Performed by: FAMILY MEDICINE

## 2025-01-16 PROCEDURE — 97810 ACUP 1/> WO ESTIM 1ST 15 MIN: CPT | Performed by: FAMILY MEDICINE

## 2025-01-16 PROCEDURE — 3074F SYST BP LT 130 MM HG: CPT | Performed by: FAMILY MEDICINE

## 2025-01-16 PROCEDURE — 1159F MED LIST DOCD IN RCRD: CPT | Performed by: FAMILY MEDICINE

## 2025-01-16 PROCEDURE — 3008F BODY MASS INDEX DOCD: CPT | Performed by: FAMILY MEDICINE

## 2025-01-16 PROCEDURE — 1125F AMNT PAIN NOTED PAIN PRSNT: CPT | Performed by: FAMILY MEDICINE

## 2025-01-16 PROCEDURE — 3078F DIAST BP <80 MM HG: CPT | Performed by: FAMILY MEDICINE

## 2025-01-16 PROCEDURE — 1160F RVW MEDS BY RX/DR IN RCRD: CPT | Performed by: FAMILY MEDICINE

## 2025-01-16 NOTE — PROCEDURES
.tbacu  Ih[place]on local points   Patient tolerated procedure well in excess of 30 minutes was spent with patient   There was no complications all needles were removed.   Patient was advised to rest today and f/u in 1-2 weeks

## 2025-01-16 NOTE — PROGRESS NOTES
Subjective:   Patient ID: Jesi Ashford is a 79 year old female.    HPI  Patient here for f/u low back pain and for acupuncture   Also has some weakness and pain in her knees   History/Other:   Review of Systems   Constitutional: Negative.  Negative for fatigue.   Respiratory:  Negative for cough, chest tightness and shortness of breath.    Cardiovascular: Negative.    Musculoskeletal:  Positive for back pain and gait problem. Negative for joint swelling and myalgias.   Neurological:  Negative for weakness and numbness.         Current Outpatient Medications   Medication Sig Dispense Refill    naproxen 500 MG Oral Tab Take 1 tablet (500 mg total) by mouth 2 (two) times daily with meals. 180 tablet 1    lisinopril 20 MG Oral Tab Take 1 tablet (20 mg total) by mouth daily. 90 tablet 3    latanoprost 0.005 % Ophthalmic Solution       nystatin [id number] UNIT/GM External Powder Use twice daily  to chest and groin 60 g 6    nystatin-triamcinolone 100,000-0.1 Units/g-% External Ointment Apply twice daily  Monday-Friday to affected areas of rash. 60 g 3    gabapentin 100 MG Oral Cap Take 2 capsules (200 mg total) by mouth nightly. 180 capsule 0    dorzolamide-timolol 2-0.5 % Ophthalmic Solution       montelukast 10 MG Oral Tab       loratadine 10 MG Oral Tab Take 1 tablet (10 mg total) by mouth daily.       Allergies:Allergies[1]    Objective:   Physical Exam  Constitutional:       Appearance: She is well-developed.   Cardiovascular:      Rate and Rhythm: Normal rate and regular rhythm.      Heart sounds: Normal heart sounds.   Pulmonary:      Effort: Pulmonary effort is normal. No respiratory distress.      Breath sounds: Normal breath sounds. No wheezing or rales.   Abdominal:      General: There is no distension.      Palpations: Abdomen is soft.      Tenderness: There is no abdominal tenderness.   Musculoskeletal:         General: Tenderness present. No deformity.      Lumbar back: Spasms and tenderness present.  Decreased range of motion.   Neurological:      Mental Status: She is alert.      Motor: No abnormal muscle tone.      Deep Tendon Reflexes: Reflexes are normal and symmetric.         Assessment & Plan:   1. Pain in both knees, unspecified chronicity    To see physiatrist   2. Other low back pain   Continue home exercises    No orders of the defined types were placed in this encounter.      Meds This Visit:  Requested Prescriptions      No prescriptions requested or ordered in this encounter       Imaging & Referrals:  PHYSIATRY - INTERNAL         [1]   Allergies  Allergen Reactions    Seasonal OTHER (SEE COMMENTS)     Runny eyes

## 2025-01-23 ENCOUNTER — OFFICE VISIT (OUTPATIENT)
Dept: FAMILY MEDICINE CLINIC | Facility: CLINIC | Age: 80
End: 2025-01-23

## 2025-01-23 VITALS
HEART RATE: 56 BPM | SYSTOLIC BLOOD PRESSURE: 114 MMHG | TEMPERATURE: 96 F | OXYGEN SATURATION: 99 % | DIASTOLIC BLOOD PRESSURE: 71 MMHG | WEIGHT: 180 LBS | HEIGHT: 65 IN | RESPIRATION RATE: 20 BRPM | BODY MASS INDEX: 29.99 KG/M2

## 2025-01-23 DIAGNOSIS — M54.59 OTHER LOW BACK PAIN: Primary | ICD-10-CM

## 2025-01-23 PROCEDURE — 97813 ACUP 1/> W/ESTIM 1ST 15 MIN: CPT | Performed by: FAMILY MEDICINE

## 2025-01-23 PROCEDURE — 3074F SYST BP LT 130 MM HG: CPT | Performed by: FAMILY MEDICINE

## 2025-01-23 PROCEDURE — 3078F DIAST BP <80 MM HG: CPT | Performed by: FAMILY MEDICINE

## 2025-01-23 PROCEDURE — 1160F RVW MEDS BY RX/DR IN RCRD: CPT | Performed by: FAMILY MEDICINE

## 2025-01-23 PROCEDURE — 3008F BODY MASS INDEX DOCD: CPT | Performed by: FAMILY MEDICINE

## 2025-01-23 PROCEDURE — 97814 ACUP 1/> W/ESTIM EA ADDL 15: CPT | Performed by: FAMILY MEDICINE

## 2025-01-23 PROCEDURE — 1159F MED LIST DOCD IN RCRD: CPT | Performed by: FAMILY MEDICINE

## 2025-01-23 NOTE — PROGRESS NOTES
Subjective:   Patient ID: Jesi Ashford is a 79 year old female.    HPI  Patient here for f/u back pain   States that is feeling better and has less pain   Also has some headache and knee pain mostly right side  History/Other:   Review of Systems   Constitutional: Negative.  Negative for fatigue.   Respiratory:  Negative for cough, chest tightness and shortness of breath.    Cardiovascular: Negative.    Musculoskeletal:  Positive for arthralgias and back pain. Negative for gait problem, joint swelling and myalgias.   Neurological:  Negative for weakness and numbness.         Current Outpatient Medications   Medication Sig Dispense Refill    naproxen 500 MG Oral Tab Take 1 tablet (500 mg total) by mouth 2 (two) times daily with meals. 180 tablet 1    lisinopril 20 MG Oral Tab Take 1 tablet (20 mg total) by mouth daily. 90 tablet 3    latanoprost 0.005 % Ophthalmic Solution       nystatin [id number] UNIT/GM External Powder Use twice daily  to chest and groin 60 g 6    nystatin-triamcinolone 100,000-0.1 Units/g-% External Ointment Apply twice daily  Monday-Friday to affected areas of rash. 60 g 3    gabapentin 100 MG Oral Cap Take 2 capsules (200 mg total) by mouth nightly. 180 capsule 0    dorzolamide-timolol 2-0.5 % Ophthalmic Solution       montelukast 10 MG Oral Tab       loratadine 10 MG Oral Tab Take 1 tablet (10 mg total) by mouth daily.       Allergies:Allergies[1]    Objective:   Physical Exam  Constitutional:       Appearance: She is well-developed.   Cardiovascular:      Rate and Rhythm: Normal rate and regular rhythm.      Heart sounds: Normal heart sounds.   Pulmonary:      Effort: Pulmonary effort is normal. No respiratory distress.      Breath sounds: Normal breath sounds. No wheezing or rales.   Abdominal:      General: There is no distension.      Palpations: Abdomen is soft.      Tenderness: There is no abdominal tenderness.   Musculoskeletal:         General: Tenderness present. No deformity.       Lumbar back: Spasms and tenderness present. Decreased range of motion.   Neurological:      Mental Status: She is alert and oriented to person, place, and time.      Motor: No abnormal muscle tone.      Coordination: Coordination normal.      Deep Tendon Reflexes: Reflexes are normal and symmetric. Reflexes normal.         Assessment & Plan:   1. Other low back pain    Felt better after treatment     Orders Placed This Encounter   Procedures    ACUPUNCT W/STIMUL 15 MIN    ACUPUNCT W/STIMUL ADDL 15M       Meds This Visit:  Requested Prescriptions      No prescriptions requested or ordered in this encounter       Imaging & Referrals:  None         [1]   Allergies  Allergen Reactions    Seasonal OTHER (SEE COMMENTS)     Runny eyes

## 2025-01-23 NOTE — PROCEDURES
Patient tolerated procedure well in excess of 30 minutes was spent with patient   There was no complications all needles were removed.   Patient was advised to rest today and f/u in 1-2 weeks  Hung yin bosch cherri low frequency stim   Ear points

## 2025-01-28 RX ORDER — MONTELUKAST SODIUM 10 MG/1
10 TABLET ORAL NIGHTLY
Qty: 90 TABLET | Refills: 3 | Status: SHIPPED | OUTPATIENT
Start: 2025-01-28

## 2025-01-28 RX ORDER — LORATADINE 10 MG/1
10 TABLET ORAL DAILY
Qty: 90 TABLET | Refills: 3 | OUTPATIENT
Start: 2025-01-28

## 2025-01-28 RX ORDER — MONTELUKAST SODIUM 10 MG/1
10 TABLET ORAL NIGHTLY
Qty: 90 TABLET | Refills: 3 | OUTPATIENT
Start: 2025-01-28

## 2025-01-28 RX ORDER — LORATADINE 10 MG/1
10 TABLET ORAL DAILY
Qty: 90 TABLET | Refills: 3 | Status: SHIPPED | OUTPATIENT
Start: 2025-01-28

## 2025-01-28 NOTE — TELEPHONE ENCOUNTER
Anesthesia Evaluation     Patient summary reviewed   NPO Solid Status: > 8 hours  NPO Liquid Status: > 2 hours           Airway   Mallampati: I  TM distance: >3 FB  Neck ROM: full  No difficulty expected  Dental - normal exam     Pulmonary    Cardiovascular - normal exam    (+) DVT resolved      Neuro/Psych  (+) CVA residual symptoms  GI/Hepatic/Renal/Endo      Musculoskeletal     Abdominal    Substance History      OB/GYN    (-)  Pregnant        Other      history of cancer remission                Anesthesia Plan    ASA 2     general     intravenous induction     Anesthetic plan, risks, benefits, and alternatives have been provided, discussed and informed consent has been obtained with: patient.  Pre-procedure education provided    CODE STATUS:    Code Status (Patient has no pulse and is not breathing): CPR (Attempt to Resuscitate)  Medical Interventions (Patient has pulse or is breathing): Full Support       ----- Message from Zia Castrejon MD sent at 4/12/2017 10:23 PM CDT -----  Needs f/u appointment to review results and Medicare annual exam.

## 2025-01-28 NOTE — TELEPHONE ENCOUNTER
Please review; protocol failed/No Protocol    Both medications were marked as discontinued on 03/05/2024    Patient reports taking on 09/08/2024 office visit- please advise if refill is appropriate.     Requested Prescriptions   Pending Prescriptions Disp Refills    montelukast 10 MG Oral Tab 90 tablet 3     Sig: Take 1 tablet (10 mg total) by mouth nightly.       Asthma & COPD Medication Protocol Failed - 1/28/2025 10:31 AM        Failed - ACT Score greater than or equal to 20                Failed - ACT recorded in the last 12 months                Failed - Medication is active on med list        Passed - Appointment in past 6 or next 3 months      Recent Outpatient Visits              5 days ago Other low back pain    [facility] [place]    Office Visit    1 week ago Pain in both knees, unspecified chronicity    [facility] [place] [facility] [place]    Office Visit    1 month ago [facility] low back pain    [facility]    1 month ago Other low back pain    [facility] [place] [place]    Office Visit    2 months ago Other low back pain    [facility]    [facility]          Future Appointments         Provider Department Appt Notes    In 2 weeks [facility]    In 2 weeks [facility]    In 3 weeks Kenya Plascencia MD [facility] [place] accup                      loratadine 10 MG Oral Tab 90 tablet 3     Sig: Take 1 tablet (10 mg total) by mouth daily.       Allergy Medication Protocol Passed - 1/28/2025 10:31 AM        Passed - In person appointment or  virtual visit in the past 12 mos or appointment in next 3 mos     Recent Outpatient Visits              5 days ago Other low back pain    [facility]    [facility]    1 week ago Pain in both knees, unspecified chronicity    [facility]    [facility]    1 month ago Other low back pain    [facility]    Office [facility]    1 month ago Other low back pain    [facility]    Office Visit    2 months ago Other low back pain    [facility]    [facility]          Future Appointments         Provider Department Appt Notes    In 2 weeks [facility]    In 2 weeks [facility]    In 3 weeks [facility]                    Passed - Medication is active on med list         Refused Prescriptions Disp Refills    LORATADINE 10 MG Oral Tab [Pharmacy Med Name: Loratadine Oral Tablet 10 MG] 90 tablet 3     Sig: TOME 1 TABLETA DIARIAMENTE       Allergy Medication Protocol Passed - 1/28/2025 10:31 AM        Passed - In person appointment or virtual visit in the past 12 mos or appointment in next 3 mos     Recent Outpatient Visits              5 days ago Other low back pain    [facility]    [facility]    1 week ago Pain in both knees, unspecified chronicity    [facility]    [facility]    1 month ago Other low back pain    [facility]    Office Visit    1 month ago [facility] low back pain    [facility]    [facility]    2 months ago Other low back pain    [facility]    [facility]          Future Appointments         Provider Department Appt Notes    In 2 weeks [facility] Plascencia MD [facility] last 02/13/24 ar    In 2 weeks [facility] Pl[facility]encia MD [facility] [place] accup    In 3 [facility]                    Passed - Medication is active on med list          MONTELUKAST 10 MG Oral Tab [Pharmacy Med Name: Montelukast Sodium Oral Tablet 10 MG] 90 tablet 3     Sig: TOME 1 TABLETA POR LA NOCHE       Asthma & COPD Medication Protocol Failed - 1/28/2025 10:31 AM        Failed - ACT Score greater than or equal to 20                Failed - ACT recorded in the last 12 months                Failed - Medication is active on med list        Passed - Appointment in past 6 or next 3 months      Recent Outpatient Visits              5 days ago Other low back pain    [facility]    Office [facility]    1 week ago Pain in both knees, unspecified chronicity    [facility]    Office [facility]    1 month ago Other low back pain    [facility]    Office [facility]    1 month ago Other low back pain    [facility]    Office [facility]    2 months ago Other low back pain    [facility]    [facility]          Future Appointments         Provider Department Appt Notes    In 2 weeks  Kenya Plascencia MD [facility] last 02/13/24 ar    In 2 weeks Kenya Plascencia MD [facility], [facility] [place] accup    In 3 weeks Kenya Plascencia MD [facility] [place] acc                       Future Appointments         Provider Department Appt Notes    In 2 weeks Kenya Plascencia MD [facility], [facility], [facility] last 02/13/24 ar    In 2 weeks Kenya Plascencia MD [facility] [place] accup    In 3 weeks Kenya Plascencia MD [facility] [place] acc          Recent Outpatient Visits              5 days ago Other low back pain    [facility] [place] [facility]    Office Visit    1 week ago Pain in both knees, unspecified chronicity    [facility], Kenya Kurtz MD    Office Visit    1 month ago Other low back pain    [facility], Kenya Kurtz MD    Office Visit    1 month ago Other low back pain    [facility]    [facility]    2 months ago Other low back pain    [facility], Kenya Kurtz MD    Office Visit

## 2025-02-11 ENCOUNTER — OFFICE VISIT (OUTPATIENT)
Dept: FAMILY MEDICINE CLINIC | Facility: CLINIC | Age: 80
End: 2025-02-11

## 2025-02-11 VITALS
TEMPERATURE: 97 F | HEART RATE: 60 BPM | WEIGHT: 175 LBS | DIASTOLIC BLOOD PRESSURE: 58 MMHG | SYSTOLIC BLOOD PRESSURE: 100 MMHG | RESPIRATION RATE: 20 BRPM | OXYGEN SATURATION: 97 % | HEIGHT: 65 IN | BODY MASS INDEX: 29.16 KG/M2

## 2025-02-11 DIAGNOSIS — H93.13 TINNITUS OF BOTH EARS: ICD-10-CM

## 2025-02-11 DIAGNOSIS — M25.511 CHRONIC RIGHT SHOULDER PAIN: ICD-10-CM

## 2025-02-11 DIAGNOSIS — H90.3 SENSORINEURAL HEARING LOSS (SNHL) OF BOTH EARS: ICD-10-CM

## 2025-02-11 DIAGNOSIS — G89.29 CHRONIC RIGHT SHOULDER PAIN: ICD-10-CM

## 2025-02-11 DIAGNOSIS — H40.1120 PRIMARY OPEN ANGLE GLAUCOMA OF LEFT EYE, UNSPECIFIED GLAUCOMA STAGE: ICD-10-CM

## 2025-02-11 DIAGNOSIS — M54.59 OTHER LOW BACK PAIN: ICD-10-CM

## 2025-02-11 DIAGNOSIS — H93.19 TINNITUS, UNSPECIFIED LATERALITY: ICD-10-CM

## 2025-02-11 DIAGNOSIS — R10.30 LOWER ABDOMINAL PAIN: ICD-10-CM

## 2025-02-11 DIAGNOSIS — E78.00 PURE HYPERCHOLESTEROLEMIA: ICD-10-CM

## 2025-02-11 DIAGNOSIS — G89.29 CHRONIC UPPER BACK PAIN: ICD-10-CM

## 2025-02-11 DIAGNOSIS — K21.00 GASTROESOPHAGEAL REFLUX DISEASE WITH ESOPHAGITIS WITHOUT HEMORRHAGE: ICD-10-CM

## 2025-02-11 DIAGNOSIS — I10 ESSENTIAL HYPERTENSION: Primary | ICD-10-CM

## 2025-02-11 DIAGNOSIS — H47.20 RIGHT OPTIC NERVE ATROPHY: ICD-10-CM

## 2025-02-11 DIAGNOSIS — M54.9 CHRONIC UPPER BACK PAIN: ICD-10-CM

## 2025-02-11 DIAGNOSIS — H40.1133 PRIMARY OPEN ANGLE GLAUCOMA (POAG) OF BOTH EYES, SEVERE STAGE: ICD-10-CM

## 2025-02-11 PROBLEM — M50.30 DEGENERATION OF INTERVERTEBRAL DISC OF CERVICAL REGION WITH OSTEOPHYTE OF CERVICAL VERTEBRA: Status: RESOLVED | Noted: 2021-09-30 | Resolved: 2025-02-11

## 2025-02-11 PROBLEM — M25.78 DEGENERATION OF INTERVERTEBRAL DISC OF CERVICAL REGION WITH OSTEOPHYTE OF CERVICAL VERTEBRA: Status: RESOLVED | Noted: 2021-09-30 | Resolved: 2025-02-11

## 2025-02-11 PROBLEM — N76.2 ACUTE VULVITIS: Status: RESOLVED | Noted: 2022-11-29 | Resolved: 2025-02-11

## 2025-02-11 PROBLEM — M54.2 TRIGGER POINT OF NECK: Status: RESOLVED | Noted: 2021-09-30 | Resolved: 2025-02-11

## 2025-02-11 PROBLEM — Z01.818 PREOPERATIVE EXAMINATION, UNSPECIFIED: Status: RESOLVED | Noted: 2024-02-29 | Resolved: 2025-02-11

## 2025-02-11 PROBLEM — J41.0 SMOKERS' COUGH (HCC): Chronic | Status: RESOLVED | Noted: 2023-04-04 | Resolved: 2025-02-11

## 2025-02-11 PROBLEM — J44.89 ASTHMA WITH COPD (CHRONIC OBSTRUCTIVE PULMONARY DISEASE) (HCC): Chronic | Status: ACTIVE | Noted: 2025-02-11

## 2025-02-11 PROCEDURE — 1170F FXNL STATUS ASSESSED: CPT | Performed by: FAMILY MEDICINE

## 2025-02-11 PROCEDURE — 96160 PT-FOCUSED HLTH RISK ASSMT: CPT | Performed by: FAMILY MEDICINE

## 2025-02-11 PROCEDURE — 1125F AMNT PAIN NOTED PAIN PRSNT: CPT | Performed by: FAMILY MEDICINE

## 2025-02-11 PROCEDURE — 1159F MED LIST DOCD IN RCRD: CPT | Performed by: FAMILY MEDICINE

## 2025-02-11 PROCEDURE — 99499 UNLISTED E&M SERVICE: CPT | Performed by: FAMILY MEDICINE

## 2025-02-11 PROCEDURE — G0439 PPPS, SUBSEQ VISIT: HCPCS | Performed by: FAMILY MEDICINE

## 2025-02-11 PROCEDURE — 3078F DIAST BP <80 MM HG: CPT | Performed by: FAMILY MEDICINE

## 2025-02-11 PROCEDURE — 3074F SYST BP LT 130 MM HG: CPT | Performed by: FAMILY MEDICINE

## 2025-02-11 PROCEDURE — 1160F RVW MEDS BY RX/DR IN RCRD: CPT | Performed by: FAMILY MEDICINE

## 2025-02-11 PROCEDURE — 3008F BODY MASS INDEX DOCD: CPT | Performed by: FAMILY MEDICINE

## 2025-02-11 RX ORDER — TIMOLOL MALEATE 5 MG/ML
1 SOLUTION/ DROPS OPHTHALMIC DAILY
COMMUNITY
Start: 2025-02-03

## 2025-02-11 RX ORDER — TRIAMCINOLONE ACETONIDE 1 MG/G
CREAM TOPICAL 2 TIMES DAILY PRN
Qty: 60 G | Refills: 0 | Status: SHIPPED | OUTPATIENT
Start: 2025-02-11

## 2025-02-11 NOTE — PROGRESS NOTES
Subjective:   Jesi Ashford is a 79 year old female who presents for a Subsequent Annual Wellness visit (Pt already had Initial Annual Wellness) and stable chronic illnesses (addressed in visit).       History/Other:   Fall Risk Assessment:   She has been screened for Falls and is High Risk. Fall Prevention information provided to patient in After Visit Summary.    Do you feel unsteady when standing or walking?: Yes  Do you worry about falling?: Yes  Have you fallen in the past year?: Yes  How many times have you fallen?: 2  Were you injured?: Yes     Cognitive Assessment:   She had a completely normal cognitive assessment - see flowsheet entries     Functional Ability/Status:   Jesi Ashford has some abnormal functions as listed below:  She has difficulties Affording Meds based on screening of functional status. She has Hearing problems based on screening of functional status.She has Vision problems based on screening of functional status. She has Walking problems based on screening of functional status.       Depression Screening (PHQ):  PHQ-2 SCORE: 0  , done 2/11/2025        5 minutes spent screening and counseling for depression    Advanced Directives:   She does NOT have a Living Will. [Do you have a living will?: No]  She does NOT have a Power of  for Health Care. [Do you have a healthcare power of ?: No]  Not discussed      Patient Active Problem List   Diagnosis    Primary open angle glaucoma (POAG) of both eyes, severe stage    Essential hypertension    Chronic upper back pain    Hyperlipidemia    Dry eye syndrome of both eyes    Class 1 obesity due to excess calories with serious comorbidity and body mass index (BMI) of 32.0 to 32.9 in adult    Gastroesophageal reflux disease with esophagitis    Chronic right-sided headaches    External hemorrhoids    Sigmoid diverticulosis    Melanosis coli    Hiatal hernia    Tinnitus of both ears    Sensorineural hearing loss (SNHL) of  both ears    Cataract of both eyes    Right optic nerve atrophy    Tendinopathy of rotator cuff    Chronic right shoulder pain    Foraminal stenosis of cervical region    Cervical facet syndrome    NSAID induced gastritis    Closed displaced fracture of olecranon process without intraarticular extension of right ulna    Asthma with COPD (chronic obstructive pulmonary disease) (Formerly [facility])     Allergies:  She is allergic to seasonal.    Current Medications:  Outpatient Medications Marked as Taking for the 2/11/25 encounter (Office Visit) with Kenya Plascencia MD   Medication Sig    timolol 0.5 % Ophthalmic Solution Place 1 drop into both eyes daily.    montelukast 10 MG Oral Tab Take 1 tablet (10 mg total) by mouth nightly.    loratadine 10 MG Oral Tab Take 1 tablet (10 mg total) by mouth daily.    naproxen 500 MG Oral Tab Take 1 tablet (500 mg total) by mouth 2 (two) times daily with meals.    lisinopril 20 MG Oral Tab Take 1 tablet (20 mg total) by mouth daily.    latanoprost 0.005 % Ophthalmic Solution     nystatin 500540 UNIT/GM External Powder Use twice daily  to chest and groin    nystatin-triamcinolone 100,000-0.1 Units/g-% External Ointment Apply twice daily  Monday-Friday to affected areas of rash.    gabapentin 100 MG Oral Cap Take 2 capsules (200 mg total) by mouth nightly.    dorzolamide-timolol 2-0.5 % Ophthalmic Solution     loratadine 10 MG Oral Tab Take 1 tablet (10 mg total) by mouth daily.       Medical History:  She  has a past medical history of Acute vulvitis (11/29/2022), Allergic conjunctivitis, Cataract, Cataract of both eyes (06/13/2019), Cataract of both eyes (06/13/2019), Cataract of both eyes (06/13/2019), Chronic right shoulder pain (09/30/2021), Chronic right-sided headaches (09/27/2017), Chronic right-sided headaches (09/27/2017), Chronic right-sided headaches (09/27/2017), Chronic upper back pain (04/11/2017), Class 1 obesity due to excess calories with serious comorbidity and body mass index  (BMI) of 32.0 to 32.9 in adult (2017), Closed displaced fracture of olecranon process without intraarticular extension of right ulna (2024), Dry eye syndrome, Dry eye syndrome of both eyes (2019), Dry eye syndrome of both eyes (2019), Dry eye syndrome of both eyes (2019), Essential hypertension, External hemorrhoids (2010), External hemorrhoids (2010), External hemorrhoids (2010), Foraminal stenosis of cervical region (2021), Foraminal stenosis of cervical region (2021), Foraminal stenosis of cervical region (2021), Glaucoma, Hiatal hernia (2010), Hiatal hernia (2010), Hiatal hernia (2010), High blood pressure, Melanosis coli (2010), Melanosis coli (2010), NSAID induced gastritis (2021), NSAID induced gastritis (2021), NSAID induced gastritis (2021), Osteoarthritis, Psychophysiological insomnia (2018), Redundant colon (2010), Redundant colon (2010), Redundant colon (2010), Shingles, Sigmoid diverticulosis (2010), Sigmoid diverticulosis (2010), Sigmoid diverticulosis (2010), Sigmoid diverticulosis (2010), Smokers' cough (HCC) (2023), Smokers' cough (HCC) (2023), Tendinopathy of rotator cuff (2021), Tendinopathy of rotator cuff (2021), Tinnitus of both ears (10/02/2018), Trigger point of neck (2021), Trigger point of neck (2021), Trigger point of neck (2021), Trigger point of neck (2021), and Visual impairment.  Surgical History:  She  has a past surgical history that includes other surgical history (Left, 2013);  (); egd (2010); and colonoscopy (2010).   Family History:  Her family history includes Arthritis in her father; Cancer in her mother; Diabetes in her son; Musculo-skelatal Disorder in her son.  Social History:  She  reports that she has never smoked. She has never used  smokeless tobacco. She reports that she does not drink alcohol and does not use drugs.    Tobacco:  She has never smoked tobacco.    CAGE Alcohol Screen:   CAGE screening score of 0 on 2/11/2025, showing low risk of alcohol abuse.      Patient Care Team:  Kenya Plascencia MD as PCP - General (Family Medicine)  Dominog Tarango MD Mora, Guillermo, MD Martinez, Marco L, MD (OBSTETRICS & GYNECOLOGY)  Selene Verdugo, SLP as Speech Language Pathologist (Speech-Language Pathologist)  Jaylin Lau, PT as Physical Therapist (Physical Therapy)  Lynette Edge, ROGER (Physical Therapy)  Donaldo Blackburn APRN (Nurse Practitioner)  Donaldo Blackburn APRN (Nurse Practitioner)  Yamilex Swenson, PT as Physical Therapist (Physical Therapy)    Review of Systems  GENERAL: feels well otherwise  SKIN: has itchy spot in the vulvar area and alsoon the arm   EYES: denies blurred vision or double vision  HEENT: denies nasal congestion, sinus pain or ST  LUNGS: denies shortness of breath with exertion  CARDIOVASCULAR: denies chest pain on exertion  GI: has some low abdominal pain and some discomfort around the rectal area   : denies dysuria, vaginal discharge or itching, no complaint of urinary incontinence   MUSCULOSKELETAL: has low and mid back pain  NEURO: denies headaches  PSYCHE: denies depression or anxiety  HEMATOLOGIC: denies hx of anemia  ENDOCRINE: denies thyroid history  ALL/ASTHMA: denies hx of allergy or asthma    Objective:   Physical Exam  General Appearance:  Alert, cooperative, no distress, appears stated age   Head:  Normocephalic, without obvious abnormality, atraumatic   Eyes:  PERRL, conjunctiva/corneas clear, EOM's intact both eyes   Ears:  Normal TM's and external ear canals, both ears   Nose: Nares normal, septum midline,mucosa normal, no drainage or sinus tenderness   Throat: Lips, mucosa, and tongue normal; teeth and gums normal   Neck: Supple, symmetrical, trachea midline, no adenopathy;  thyroid: not  enlarged, symmetric, no tenderness/mass/nodules; no carotid bruit or JVD   Back:   Tenderness low and mid back    Lungs:   Clear to auscultation bilaterally, respirations unlabored   Heart:  Regular rate and rhythm, S1 and S2 normal, no murmur, rub, or gallop   Abdomen:   Soft, non-tender, bowel sounds active all four quadrants,  no masses, no organomegaly   Pelvic: Deferred   Extremities: Extremities normal, atraumatic, no cyanosis or edema   Pulses: 2+ and symmetric   Skin: Dry patches on the skin    Lymph nodes: Cervical, supraclavicular, and axillary nodes normal   Neurologic: Normal       /58   Pulse 60   Temp 97.2 °F (36.2 °C) (Temporal)   Resp 20   Ht 5' 5\" (1.651 m)   Wt 175 lb (79.4 kg)   SpO2 97%   BMI 29.12 kg/m²  Estimated body mass index is 29.12 kg/m² as calculated from the following:    Height as of this encounter: 5' 5\" (1.651 m).    Weight as of this encounter: 175 lb (79.4 kg).    Medicare Hearing Assessment:   Hearing Screening    Time taken: 2/11/2025 11:48 AM  Entry User: Sandra Zaragoza  Screening Method: Finger Rub  Finger Rub Result: Pass               Assessment & Plan:   Jesi Ashford is a 79 year old female who presents for a Medicare Assessment.     1. Essential hypertension (Primary)  Controlled cpm  -     Comp Metabolic Panel (14); Future; Expected date: 02/11/2025  -     CBC With Differential With Platelet; Future; Expected date: 02/11/2025  -     Comp Metabolic Panel (14); Future; Expected date: 02/11/2025  2. Chronic upper back pain  Will start physical therapy   3. Gastroesophageal reflux disease with esophagitis without hemorrhage  Stable cpm  4. Tinnitus of both ears  Stable will see ent  5. Sensorineural hearing loss (SNHL) of both ears  Stable see ent  6. Primary open angle glaucoma (POAG) of both eyes, severe stage  Needs to see ophthalmology  Overview:  With Optic Nerve Atrophy R > L  New onset tinnitus, R sided HA    7. Right optic nerve atrophy  -      Ophthalmology Referral - In Network  8. Pure hypercholesterolemia  Stable cpm  -     Lipid Panel; Future; Expected date: 02/11/2025  9. Chronic right shoulder pain  10. Other low back pain  Slightly worse start physical therapy   -     Physical Therapy Referral - [facility]  11. Primary open angle glaucoma of left eye, unspecified glaucoma stage  Stable to see ophthalmology  12. Tinnitus, unspecified laterality  -     ENT Referral - [place] ([facility])  13. Lower abdominal pain  Continues will see gi  -     Gastro Referral - In Network    The patient indicates understanding of these issues and agrees to the plan.  Reinforced healthy diet, lifestyle, and exercise.      No follow-ups on file.     Kenya Plascencia MD, 2/11/2025     Supplementary Documentation:   General Health:  In the past six months, have you lost more than 10 pounds without trying?: 3 - Don't know  Has your appetite been poor?: No  How does the patient maintain a good energy level?: Daily Walks  How would you describe your daily physical activity?: Light  How would you describe your current health state?: Fair  How do you maintain positive mental well-being?: Visiting Family  On a scale of 0 to 10, with 0 being no pain and 10 being severe pain, what is your pain level?: 7 - (Severe)  In the past six months, have you experienced urine leakage?: 1-Yes  At any time do you feel concerned for the safety/well-being of yourself and/or your children, in your home or elsewhere?: No  Have you had any immunizations at another office such as Influenza, Hepatitis B, Tetanus, or Pneumococcal?: No    Health Maintenance   Topic Date Due    Annual Well Visit  01/01/2025    Influenza Vaccine  Completed    DEXA Scan  Completed    Annual Depression Screening  Completed    Fall Risk Screening (Annual)  Completed    Pneumococcal Vaccine: 50+ Years  Completed    Zoster Vaccines  Completed    COVID-19 Vaccine  Completed    Meningococcal B Vaccine  Aged Out     Colonoscopy  Discontinued

## 2025-04-08 ENCOUNTER — OFFICE VISIT (OUTPATIENT)
Dept: OTOLARYNGOLOGY | Facility: CLINIC | Age: 80
End: 2025-04-08

## 2025-04-08 ENCOUNTER — OFFICE VISIT (OUTPATIENT)
Dept: AUDIOLOGY | Facility: CLINIC | Age: 80
End: 2025-04-08

## 2025-04-08 ENCOUNTER — LAB ENCOUNTER (OUTPATIENT)
Dept: LAB | Facility: HOSPITAL | Age: 80
End: 2025-04-08
Attending: FAMILY MEDICINE
Payer: MEDICARE

## 2025-04-08 VITALS — BODY MASS INDEX: 29.16 KG/M2 | HEIGHT: 65 IN | WEIGHT: 175 LBS

## 2025-04-08 DIAGNOSIS — H90.3 SENSORINEURAL HEARING LOSS (SNHL) OF BOTH EARS: Primary | ICD-10-CM

## 2025-04-08 DIAGNOSIS — R42 DIZZINESS: ICD-10-CM

## 2025-04-08 DIAGNOSIS — I10 ESSENTIAL HYPERTENSION: ICD-10-CM

## 2025-04-08 DIAGNOSIS — R42 VERTIGO: ICD-10-CM

## 2025-04-08 DIAGNOSIS — H93.19 TINNITUS, UNSPECIFIED LATERALITY: ICD-10-CM

## 2025-04-08 DIAGNOSIS — E78.00 PURE HYPERCHOLESTEROLEMIA: ICD-10-CM

## 2025-04-08 DIAGNOSIS — H93.13 RINGING IN EAR, BILATERAL: ICD-10-CM

## 2025-04-08 DIAGNOSIS — H91.93 BILATERAL HEARING LOSS, UNSPECIFIED HEARING LOSS TYPE: Primary | ICD-10-CM

## 2025-04-08 LAB
ALBUMIN SERPL-MCNC: 4.1 G/DL (ref 3.2–4.8)
ALBUMIN/GLOB SERPL: 1.5 {RATIO} (ref 1–2)
ALP LIVER SERPL-CCNC: 51 U/L
ALT SERPL-CCNC: 14 U/L
ANION GAP SERPL CALC-SCNC: 8 MMOL/L (ref 0–18)
AST SERPL-CCNC: 26 U/L (ref ?–34)
BASOPHILS # BLD AUTO: 0.03 X10(3) UL (ref 0–0.2)
BASOPHILS NFR BLD AUTO: 0.5 %
BILIRUB SERPL-MCNC: 1 MG/DL (ref 0.2–1.1)
BUN BLD-MCNC: 16 MG/DL (ref 9–23)
BUN/CREAT SERPL: 20.3 (ref 10–20)
CALCIUM BLD-MCNC: 9.5 MG/DL (ref 8.7–10.4)
CHLORIDE SERPL-SCNC: 104 MMOL/L (ref 98–112)
CHOLEST SERPL-MCNC: 190 MG/DL (ref ?–200)
CO2 SERPL-SCNC: 27 MMOL/L (ref 21–32)
CREAT BLD-MCNC: 0.79 MG/DL
DEPRECATED RDW RBC AUTO: 47.1 FL (ref 35.1–46.3)
EGFRCR SERPLBLD CKD-EPI 2021: 76 ML/MIN/1.73M2 (ref 60–?)
EOSINOPHIL # BLD AUTO: 0.15 X10(3) UL (ref 0–0.7)
EOSINOPHIL NFR BLD AUTO: 2.3 %
ERYTHROCYTE [DISTWIDTH] IN BLOOD BY AUTOMATED COUNT: 13.3 % (ref 11–15)
FASTING PATIENT LIPID ANSWER: YES
FASTING STATUS PATIENT QL REPORTED: YES
GLOBULIN PLAS-MCNC: 2.7 G/DL (ref 2–3.5)
GLUCOSE BLD-MCNC: 98 MG/DL (ref 70–99)
HCT VFR BLD AUTO: 42 %
HDLC SERPL-MCNC: 59 MG/DL (ref 40–59)
HGB BLD-MCNC: 14 G/DL
IMM GRANULOCYTES # BLD AUTO: 0.01 X10(3) UL (ref 0–1)
IMM GRANULOCYTES NFR BLD: 0.2 %
LDLC SERPL CALC-MCNC: 115 MG/DL (ref ?–100)
LYMPHOCYTES # BLD AUTO: 1.74 X10(3) UL (ref 1–4)
LYMPHOCYTES NFR BLD AUTO: 26.9 %
MCH RBC QN AUTO: 31.7 PG (ref 26–34)
MCHC RBC AUTO-ENTMCNC: 33.3 G/DL (ref 31–37)
MCV RBC AUTO: 95.2 FL
MONOCYTES # BLD AUTO: 0.6 X10(3) UL (ref 0.1–1)
MONOCYTES NFR BLD AUTO: 9.3 %
NEUTROPHILS # BLD AUTO: 3.95 X10 (3) UL (ref 1.5–7.7)
NEUTROPHILS # BLD AUTO: 3.95 X10(3) UL (ref 1.5–7.7)
NEUTROPHILS NFR BLD AUTO: 60.8 %
NONHDLC SERPL-MCNC: 131 MG/DL (ref ?–130)
OSMOLALITY SERPL CALC.SUM OF ELEC: 289 MOSM/KG (ref 275–295)
PLATELET # BLD AUTO: 198 10(3)UL (ref 150–450)
POTASSIUM SERPL-SCNC: 4.6 MMOL/L (ref 3.5–5.1)
PROT SERPL-MCNC: 6.8 G/DL (ref 5.7–8.2)
RBC # BLD AUTO: 4.41 X10(6)UL
SODIUM SERPL-SCNC: 139 MMOL/L (ref 136–145)
TRIGL SERPL-MCNC: 91 MG/DL (ref 30–149)
VLDLC SERPL CALC-MCNC: 16 MG/DL (ref 0–30)
WBC # BLD AUTO: 6.5 X10(3) UL (ref 4–11)

## 2025-04-08 PROCEDURE — 1159F MED LIST DOCD IN RCRD: CPT | Performed by: OTOLARYNGOLOGY

## 2025-04-08 PROCEDURE — 85025 COMPLETE CBC W/AUTO DIFF WBC: CPT

## 2025-04-08 PROCEDURE — 80053 COMPREHEN METABOLIC PANEL: CPT

## 2025-04-08 PROCEDURE — 3008F BODY MASS INDEX DOCD: CPT | Performed by: OTOLARYNGOLOGY

## 2025-04-08 PROCEDURE — 80061 LIPID PANEL: CPT

## 2025-04-08 PROCEDURE — 36415 COLL VENOUS BLD VENIPUNCTURE: CPT

## 2025-04-08 PROCEDURE — 92567 TYMPANOMETRY: CPT | Performed by: AUDIOLOGIST

## 2025-04-08 PROCEDURE — 99214 OFFICE O/P EST MOD 30 MIN: CPT | Performed by: OTOLARYNGOLOGY

## 2025-04-08 PROCEDURE — 1160F RVW MEDS BY RX/DR IN RCRD: CPT | Performed by: OTOLARYNGOLOGY

## 2025-04-08 PROCEDURE — 92557 COMPREHENSIVE HEARING TEST: CPT | Performed by: AUDIOLOGIST

## 2025-04-08 RX ORDER — AZELASTINE 1 MG/ML
2 SPRAY, METERED NASAL 2 TIMES DAILY
Qty: 30 ML | Refills: 0 | Status: SHIPPED | OUTPATIENT
Start: 2025-04-08

## 2025-04-08 NOTE — PROGRESS NOTES
Jesi Ashford is a 79 year old female.    Chief Complaint   Patient presents with    Hearing Loss     Patient here for bilateral ear hearing loss and ringing x 4 years. Pt reports balance issues, dizziness.       HISTORY OF PRESENT ILLNESS  She presents with several complaints.  Complains of tinnitus bilaterally that she has had for years.  Seen by Dr. Johnson and at that time demonstrated to have a normal downsloping to slightly moderate sensorineural hearing loss.  Repeat audiogram performed today reveals worsening of her mid and high-frequency hearing most likely explaining her worsening tinnitus.  In addition she complains of throat discomfort phlegm trouble with clearing her throat at times and sensation of difficulty swallowing at times.  No tobacco use no alcohol use.  Does relate a history of reflux on no medications and also feels congested all the time.     7/21/22 she presents today with no improvement at all with the use of medications such as Singulair loratadine and fluticasone.  Continues to have difficulty with food passing states that when she swallows certain foods to get stuck in her throat that she has to wait minutes for them to pass before she can continue to drink or eat.  She states that if she drinks water during these episodes of choking that the water just regurgitates.  No other signs, symptoms or complaints.      1/20/23 I last saw her in July and she underwent a video swallow esophagram earlier.  Esophagram demonstrated hiatal hernia which is mild and video swallow which was done initially demonstrated normal oropharyngeal motility and what appeared to be some dysmotility in the esophagus with dilation and narrowing more distal in the esophagus.  She states that she still having some issues with food going down but now states that she is having some throat discomfort.  Notes reflux.  She was on Claritin Singulair Flonase but stopped her use in the past.  No other new signs,  symptoms or complaints      2/24/23 here to go over the results of her video swallow and esophagram.  Esophagram demonstrated a mild hiatal hernia.  She is currently using a PPI.  Currently using Claritin Singulair and Flonase for congestion and postnasal drip and states that over the last few months since she has been using the medication she has noted improvement in her swallow.  Video swallow esophagram was performed late last year and demonstrated essentially normal swallow.  No dysphagia therapy recommended by speech pathologist.  No penetration no aspiration.  Currently without any new signs, symptoms or complaints     4/8/25 she presents today with complaints of imbalance which occurs only when she is walking.  Has tinnitus bilaterally as well as a perception of no significant hearing loss.  Audiogram was performed today and compared to her previous study which demonstrates unchanged normal downsloping to moderate sensorineural hearing loss at the # frequencies.  I did review this with the patient in the office today.  We also went over the fact that she complains about some phlegm in the back of her throat currently on montelukast and an antihistamine by using no nasal sprays.  She has had swallow studies in the past which were relatively unremarkable.      Social History     Socioeconomic History    Marital status:    Tobacco Use    Smoking status: Never    Smokeless tobacco: Never   Vaping Use    Vaping status: Never Used   Substance and Sexual Activity    Alcohol use: No    Drug use: No   Other Topics Concern    Pt has a pacemaker No    Pt has a defibrillator No    Reaction to local anesthetic No    Caffeine Concern No    Exercise No    Seat Belt Yes    Special Diet No    Stress Concern No    Weight Concern Yes       Family History   Problem Relation Age of Onset    Arthritis Father     Cancer Mother         uterine CA    Diabetes Son     Musculo-skelatal Disorder Son         scoliosis       Past  Medical History:    Acute vulvitis    Allergic conjunctivitis    Cataract    Cataract of both eyes    Cataract of both eyes    Cataract of both eyes    Chronic right shoulder pain    Chronic right-sided headaches    Chronic right-sided headaches    Chronic right-sided headaches    Chronic upper back pain    Class 1 obesity due to excess calories with serious comorbidity and body mass index (BMI) of 32.0 to 32.9 in adult    Closed displaced fracture of olecranon process without intraarticular extension of right ulna    Dry eye syndrome    Dry eye syndrome of both eyes    Dry eye syndrome of both eyes    Dry eye syndrome of both eyes    Essential hypertension    External hemorrhoids    External hemorrhoids    External hemorrhoids    Foraminal stenosis of cervical region    Foraminal stenosis of cervical region    Foraminal stenosis of cervical region    Glaucoma    Hiatal hernia    Hiatal hernia    Hiatal hernia    High blood pressure    Melanosis coli    Melanosis coli    NSAID induced gastritis    NSAID induced gastritis    NSAID induced gastritis    Osteoarthritis    knee, hips    Psychophysiological insomnia    Redundant colon    Redundant colon    Redundant colon    Shingles    Sigmoid diverticulosis    Sigmoid diverticulosis    Sigmoid diverticulosis    Sigmoid diverticulosis    Smokers' cough (HCC)    Smokers' cough (HCC)    Tendinopathy of rotator cuff    Tendinopathy of rotator cuff    Tinnitus of both ears    Trigger point of neck    Trigger point of neck    Trigger point of neck    Trigger point of neck    Visual impairment    glasses       Past Surgical History:   Procedure Laterality Date          Colonoscopy  2010    external hemorrhoids, redundant/tortuous colon, sigmoid diverticulosis, and melanosis coli    Egd  2010    hiatal hernia and gastritis    Other surgical history Left 2013    L forearm ORIF         REVIEW OF SYSTEMS    System Neg/Pos Details   Constitutional  Negative Fatigue, fever and weight loss.   ENMT Negative Drooling.   Eyes Negative Blurred vision and vision changes.   Respiratory Negative Dyspnea and wheezing.   Cardio Negative Chest pain, irregular heartbeat/palpitations and syncope.   GI Negative Abdominal pain and diarrhea.   Endocrine Negative Cold intolerance and heat intolerance.   Neuro Negative Tremors.   Psych Negative Anxiety and depression.   Integumentary Negative Frequent skin infections, pigment change and rash.   Hema/Lymph Negative Easy bleeding and easy bruising.           PHYSICAL EXAM    Ht 5' 5\" (1.651 m)   Wt 175 lb (79.4 kg)   BMI 29.12 kg/m²        Constitutional Normal Overall appearance - Normal.   Psychiatric Normal Orientation - Oriented to time, place, person & situation. Appropriate mood and affect.   Neck Exam Normal Inspection - Normal. Palpation - Normal. Parotid gland - Normal. Thyroid gland - Normal.   Eyes Normal Conjunctiva - Right: Normal, Left: Normal. Pupil - Right: Normal, Left: Normal. Fundus - Right: Normal, Left: Normal.   Neurological Normal Memory - Normal. Cranial nerves - Cranial nerves II through XII grossly intact.   Head/Face Normal Facial features - Normal. Eyebrows - Normal. Skull - Normal.        Nasopharynx Normal External nose - Normal. Lips/teeth/gums - Normal. Tonsils - Normal. Oropharynx - Normal.   Ears Normal Inspection - Right: Normal, Left: Normal. Canal - Right: Normal, Left: Normal. TM - Right: Normal, Left: Normal.   Skin Normal Inspection - Normal.        Lymph Detail Normal Submental. Submandibular. Anterior cervical. Posterior cervical. Supraclavicular.        Nose/Mouth/Throat Normal External nose - Normal. Lips/teeth/gums - Normal. Tonsils - Normal. Oropharynx - Normal.   Nose/Mouth/Throat Normal Nares - Right: Normal Left: Normal. Septum -Normal  Turbinates - Right: Normal, Left: Normal.       Current Outpatient Medications:     azelastine 0.1 % Nasal Solution, 2 sprays by Nasal route 2  (two) times daily., Disp: 30 mL, Rfl: 0    timolol 0.5 % Ophthalmic Solution, Place 1 drop into both eyes daily., Disp: , Rfl:     triamcinolone 0.1 % External Cream, Apply topically 2 (two) times daily as needed., Disp: 60 g, Rfl: 0    montelukast 10 MG Oral Tab, Take 1 tablet (10 mg total) by mouth nightly., Disp: 90 tablet, Rfl: 3    loratadine 10 MG Oral Tab, Take 1 tablet (10 mg total) by mouth daily., Disp: 90 tablet, Rfl: 3    naproxen 500 MG Oral Tab, Take 1 tablet (500 mg total) by mouth 2 (two) times daily with meals., Disp: 180 tablet, Rfl: 1    lisinopril 20 MG Oral Tab, Take 1 tablet (20 mg total) by mouth daily., Disp: 90 tablet, Rfl: 3    latanoprost 0.005 % Ophthalmic Solution, , Disp: , Rfl:     nystatin 170076 UNIT/GM External Powder, Use twice daily  to chest and groin, Disp: 60 g, Rfl: 6    nystatin-triamcinolone 100,000-0.1 Units/g-% External Ointment, Apply twice daily  Monday-Friday to affected areas of rash., Disp: 60 g, Rfl: 3    dorzolamide-timolol 2-0.5 % Ophthalmic Solution, , Disp: , Rfl:     loratadine 10 MG Oral Tab, Take 1 tablet (10 mg total) by mouth daily., Disp: , Rfl:     gabapentin 100 MG Oral Cap, Take 2 capsules (200 mg total) by mouth nightly. (Patient not taking: Reported on 4/8/2025), Disp: 180 capsule, Rfl: 0  ASSESSMENT AND PLAN    1. Bilateral hearing loss, unspecified hearing loss type  - Audiology Referral - In Network    2. Ringing in ear, bilateral  - Audiology Referral - In Network    3. Vertigo  Etiology of her vertigo is unclear.  She has been having it for 4 years and is pretty much just imbalance that occurs when she is walking.  We did discuss possibly that this may be a neurologic issue as well but I have recommended that she undergo a balance study since she has been dealing with this for over 4 years.  In addition I will start her on Astelin in addition to her allergy medications see if this helps with some of her nasal congestive issues.  Study.  Audiogram  demonstrates unchanged hearing from 2022 with a normal downsloping to moderate high-frequency sensorineural hearing loss.  - Audiology Referral - In Network        This note was prepared using Dragon [employer] voice recognition dictation software. As a result errors may occur. When identified these errors have been corrected. While every attempt is made to correct errors during dictation discrepancies may still exist    Víctor Caro MD    4/8/2025    11:09 AM

## 2025-05-13 ENCOUNTER — TELEPHONE (OUTPATIENT)
Dept: OTOLARYNGOLOGY | Facility: CLINIC | Age: 80
End: 2025-05-13

## 2025-05-13 NOTE — TELEPHONE ENCOUNTER
Per patient has questions regarding VNG instructions, has appointment on 5/15, requesting to speak to RN. Please call thank you. Hungarian speaker.

## 2025-05-13 NOTE — TELEPHONE ENCOUNTER
Using  [employer] #[id number], we left a VM for Jesi letting her know I was returning her call about the VNG test.

## 2025-05-15 ENCOUNTER — TELEPHONE (OUTPATIENT)
Dept: FAMILY MEDICINE CLINIC | Facility: CLINIC | Age: 80
End: 2025-05-15

## 2025-05-15 ENCOUNTER — OFFICE VISIT (OUTPATIENT)
Dept: AUDIOLOGY | Facility: CLINIC | Age: 80
End: 2025-05-15

## 2025-05-15 DIAGNOSIS — R42 DIZZINESS: Primary | ICD-10-CM

## 2025-05-15 PROCEDURE — 1159F MED LIST DOCD IN RCRD: CPT | Performed by: AUDIOLOGIST

## 2025-05-15 PROCEDURE — 92540 BASIC VESTIBULAR EVALUATION: CPT | Performed by: AUDIOLOGIST

## 2025-05-15 PROCEDURE — 92537 CALORIC VSTBLR TEST W/REC: CPT | Performed by: AUDIOLOGIST

## 2025-05-15 RX ORDER — TRIAMCINOLONE ACETONIDE 1 MG/G
CREAM TOPICAL
Qty: 60 G | Refills: 3 | Status: SHIPPED | OUTPATIENT
Start: 2025-05-15

## 2025-05-15 NOTE — PROGRESS NOTES
Department Of Audiology  Videonystagmography (VNG) Report  Test Date: 5/15/2025  Referring Physician:  Víctor Caro MD     Patient: Jesi Ashford  GE: [id number]  : 1945     History:   Jesi Ashford, 79 year old female, was seen today for VNG evaluation.  was used during today's testing.      Spontaneous Nystagmus:   No significant spontaneous nystagmus was observed or recorded with vision or vision denied.     Gaze-Evoked Nystagmus:   No significant gaze nystagmus was recorded on right, left, upward or downward gaze with vision/fixation.      Oculomotor Testing (Central Test Battery):  Pendular Tracking: Abnormal smooth pursuit for both rightward and leftward moving targets at all test frequencies.   Saccadic Tracking: Normal velocity and latency for both rightward and leftward moving targets. Abnormal accuracy for both rightward and leftward moving targets.   Optokinetic Testing: Normal OPK's at both slow (20 degrees/second) and fast (40 degrees/second) target speeds.        Static Positional Tests:  Supine: No nystagmus  Head Right: 3 degrees/second right beating nystagmus  Head Left: No nystagmus  Whole body right: 5 degrees/second right beating nystagmus  Whole body left: 2 degrees/second left beating nystagmus       Dynamic Positioning Tests - Ashmore-Hallpike Test (for posterior canal BPPV):   Did Not Test     Caloric Tests:  RC: 32 degrees/sec  Total SPV: 184 degrees per second   degrees/sec  Unilateral Weakness: 18% left ear weakness; non-pathologic  RW: 77 degrees/sec  Directional Preponderance: 13% stronger right beating; non-pathologic  LW: 48 degrees/sec  Fixation: Abnormal. Patient was unable to fixate for all 4 caloric irrigations. This is a CNS finding.     Impression:  Oculomotor test battery abnormalities are a CNS finding.  Static positional nystagmus is a non-localizing finding of either central or peripheral origin.   Caloric testing appears normal  and symmetric without significant unilateral weakness or directional preponderance.  Lack of fixation suppression is a CNS finding.     Recommendations:  1. Follow-up with referring provider, Víctor Caro MD, with today's test findings.   2. Consider neurology referral      Copies of today's testing were sent to scanning to be uploaded into the patient's EMR.           Sindhu Tsai.  Clinical Audiologist

## 2025-05-23 ENCOUNTER — OFFICE VISIT (OUTPATIENT)
Dept: OTOLARYNGOLOGY | Facility: CLINIC | Age: 80
End: 2025-05-23

## 2025-05-23 VITALS — BODY MASS INDEX: 29 KG/M2 | WEIGHT: 175 LBS

## 2025-05-23 DIAGNOSIS — R26.89 IMBALANCE: Primary | ICD-10-CM

## 2025-05-23 DIAGNOSIS — H91.93 BILATERAL HEARING LOSS, UNSPECIFIED HEARING LOSS TYPE: ICD-10-CM

## 2025-05-24 NOTE — PROGRESS NOTES
Jesi Ashford is a 79 year old female.    Chief Complaint   Patient presents with    Results     Patient is hete for VNG results        HISTORY OF PRESENT ILLNESS  he presents with several complaints.  Complains of tinnitus bilaterally that she has had for years.  Seen by Dr. Johnson and at that time demonstrated to have a normal downsloping to slightly moderate sensorineural hearing loss.  Repeat audiogram performed today reveals worsening of her mid and high-frequency hearing most likely explaining her worsening tinnitus.  In addition she complains of throat discomfort phlegm trouble with clearing her throat at times and sensation of difficulty swallowing at times.  No tobacco use no alcohol use.  Does relate a history of reflux on no medications and also feels congested all the time.     7/21/22 she presents today with no improvement at all with the use of medications such as Singulair loratadine and fluticasone.  Continues to have difficulty with food passing states that when she swallows certain foods to get stuck in her throat that she has to wait minutes for them to pass before she can continue to drink or eat.  She states that if she drinks water during these episodes of choking that the water just regurgitates.  No other signs, symptoms or complaints.      1/20/23 I last saw her in July and she underwent a video swallow esophagram earlier.  Esophagram demonstrated hiatal hernia which is mild and video swallow which was done initially demonstrated normal oropharyngeal motility and what appeared to be some dysmotility in the esophagus with dilation and narrowing more distal in the esophagus.  She states that she still having some issues with food going down but now states that she is having some throat discomfort.  Notes reflux.  She was on Claritin Singulair Flonase but stopped her use in the past.  No other new signs, symptoms or complaints      2/24/23 here to go over the results of her video  OFFICE CONSULT NOTE      HPI:  Pooja Lockhart is a 75 year old female presenting today for surgical evaluation of severe symptomatic aortic stenosis and multivessel CAD. She complains of fatigue and [place]. Denies chest pain, tightness, lightheadedness or syncope. She ambulates with a cane, but not around the house. She is able to do 1-2 flights of stairs but tends to avoid them out of fear of falling. She becomes fatigued on flat ground at around 1-2 blocks. She lives independently. She was initially worked up for possible TAVR. At time of C she was found to have restenosis of prior LAD stent as well as native diagonal and PDA disease. The diagonal had PCI performed on 12/30/24. Aortic valve study at that time was classified as moderate. On TTE, MG= 40; MANNY= 0.9, EF is preserved with mild/moderate LVH and early diastolic dysfunction. No other significant valvular abnormalities. She also has a very remote history of afib. She claims to not have had an episode in over 5 years but does remain on xarelto.     PMHx:  Patient  has a past medical history of Allergies, Asthma (CMD), Atrial fibrillation  (CMD) (2017), CAD (coronary artery disease), Corneal ulcer of left eye (1970s), Degenerative arthritis of spine, Depression, Esophageal reflux, Essential (primary) hypertension, Leg weakness (08/31/2014), Lumbar radiculitis (08/31/2014), Lumbar spondylosis (08/31/2014), Macular degeneration, Morbid obesity  (CMD), PONV (postoperative nausea and vomiting), RAD (reactive airway disease) (CMD), and Sleep apnea.    SOCIAL:  Patient  reports that she has never smoked. She has been exposed to tobacco smoke. She has never used smokeless tobacco. She reports that she does not drink alcohol and does not use drugs.    FHx:  none     Allergies:  ALLERGIES:   Allergen Reactions    Coconut RASH and SHORTNESS OF BREATH    Flax Seed   (Food Or Med) SHORTNESS OF BREATH    Hydrochlorothiazide Other (See Comments)     Hyponatremia      Mushroom   (Food) SHORTNESS OF BREATH    Penicillins Other (See Comments)     Fever      Sulfa Antibiotics Other (See Comments)     Convulsions      Zocor [Simvastatin - High Dose] RASH     Severe rash      Advair Diskus RASH     Thrush]      Allergy Other (See Comments)     Feathers/Down:  Congestion                               Asthma      Anaprox [Naproxen Sodium] GI UPSET    Carafate GI UPSET    Cat Dander Other (See Comments)     Asthma      Dog Dander Other (See Comments)     Asthma      Horse   (Animal) Other (See Comments)     Congestion      Ibuprofen HEADACHES    Lactose Intolerance [Milk Intolerance   (Food)] DIARRHEA     Gassy      Lasix Other (See Comments)     Pt has allergies to Sulfa medications, Pharmacist stated it was in the same family and not to take this medication.    Mold   (Environmental) Other (See Comments)     Asthma      Naprosyn [Naproxen] Other (See Comments)     Liver problems      Suture Other (See Comments)     [place]-like swelling      Tagamet GI UPSET    Tramadol CONSTIPATION    Ventolin CARDIAC DISTURBANCES     Tachycardia      Xanax Other (See Comments)     Nightmares      Zantac DIZZINESS    Aspirin GI UPSET    Macrobid [Kdc:Red Dye+Yellow Dye+Nitrofurantoin+Brilliant Blue Fcf] Other (See Comments)     Chills- possibly related to antibiotic    Spironolactone Other (See Comments)     hyperkalemia    Venlafaxine WEAKNESS     Increased pulse, fatiuge        Medications:  Current Medications    ACETAMINOPHEN (TYLENOL) 325 MG TABLET    Take 2 tablets by mouth every 4 hours as needed for Pain or Fever.    ALBUTEROL 108 (90 BASE) MCG/ACT INHALER    Inhale 2 puffs into the lungs four times daily as needed for shortness of breath or wheezing.    ASCORBIC ACID (VITAMIN C) 500 MG TABLET    Take 500 mg by mouth daily.    CHOLECALCIFEROL (VITAMIN D3) 50 MCG (2,000 UNITS) CAPSULE    Take 2,000 Units by mouth daily.    CITALOPRAM (CELEXA) 10 MG TABLET    Take 0.5 tablets by mouth daily.     swallow and esophagram.  Esophagram demonstrated a mild hiatal hernia.  She is currently using a PPI.  Currently using Claritin Singulair and Flonase for congestion and postnasal drip and states that over the last few months since she has been using the medication she has noted improvement in her swallow.  Video swallow esophagram was performed late last year and demonstrated essentially normal swallow.  No dysphagia therapy recommended by speech pathologist.  No penetration no aspiration.  Currently without any new signs, symptoms or complaints     4/8/25 she presents today with complaints of imbalance which occurs only when she is walking.  Has tinnitus bilaterally as well as a perception of no significant hearing loss.  Audiogram was performed today and compared to her previous study which demonstrates unchanged normal downsloping to moderate sensorineural hearing loss at the # frequencies.  I did review this with the patient in the office today.  We also went over the fact that she complains about some phlegm in the back of her throat currently on montelukast and an antihistamine by using no nasal sprays.  She has had swallow studies in the past which were relatively unremarkable.     5/23/25 she presents today to go over the results of her balance study.  Balance reveals normal peripheral vestibular function.  No significant asymmetries noted.  There were some soft signs of a central issue with abnormal optokinetic testing.  Here to discuss further management.  During the study she really  did not feel the level of swaying that she normally feels at home.  Previous audiogram demonstrates normal downsloping to near severe sensory hearing loss bilaterally.      Social Hx on file[1]    Family History[2]    Past Medical History[3]    Past Surgical History[4]      REVIEW OF SYSTEMS    System Neg/Pos Details   Constitutional Negative Fatigue, fever and weight loss.   ENMT Negative Drooling.   Eyes Negative Blurred  CLOPIDOGREL (PLAVIX) 75 MG TABLET    Take 1 tablet by mouth daily. Indications: Percutaneous Coronary Intervention, drug eluting stent to diagonal coronary artery 12/30/24    CLOTRIMAZOLE (LOTRIMIN) 1 % CREAM    Apply 1 application. topically in the morning and 1 application. in the evening. Apply to affected area twice daily    CYANOCOBALAMIN (VITAMIN B-12) 500 MCG TABLET    Take 500 mcg by mouth daily.     DILTIAZEM (DILT-XR) 180 MG 24 HR CAPSULE    TAKE 1 CAPSULE BY MOUTH DAILY    DOCUSATE SODIUM (COLACE) 100 MG CAPSULE    Take 100 mg by mouth daily.    EZETIMIBE (ZETIA) 10 MG TABLET    Take 1 tablet by mouth daily.    FERROUS SULFATE (IRON SLOW RELEASE) 140 (45 FE) MG TAB CR    Take 1 tablet by mouth daily.    MAGNESIUM OXIDE PO    Take 1 tablet by mouth daily.    MELATONIN 5 MG CAP    Take 5 mg by mouth nightly.     MONTELUKAST (SINGULAIR) 10 MG TABLET    Take 1 tablet by mouth every evening.    MULTIPLE VITAMINS-MINERALS (PRESERVISION AREDS 2+MULTI VIT PO)    Take 1 tablet by mouth daily.    OXYBUTYNIN (DITROPAN-XL) 5 MG 24 HR TABLET    TAKE 1 TABLET BY MOUTH DAILY    PANTOPRAZOLE (PROTONIX) 40 MG TABLET    Take 1 tablet by mouth in the morning and 1 tablet in the evening.    PRENATAL VIT-FE FUMARATE-FA (PRENATAL PO)    Take 1 tablet by mouth daily. Alternates with multivitamin     RIVAROXABAN (XARELTO) 20 MG TAB    Take 1 tablet by mouth daily.    ROSUVASTATIN (CRESTOR) 40 MG TABLET    TAKE 1 TABLET BY MOUTH DAILY    SENNA (SENOKOT) 8.6 MG TABLET    Take 2 tablets by mouth as needed for Constipation. Just started taking about a week ago.    TRIAMCINOLONE (KENALOG) 0.025 % OINTMENT    BID for 3 days prn rash    VALSARTAN (DIOVAN) 320 MG TABLET    TAKE 1 TABLET BY MOUTH EVERY DAY       ROS:  See HPI    Vital signs:  Visit Vitals  BP (!) 160/62 (BP Location: LUE - Left upper extremity, Patient Position: Sitting)   Pulse 66   Temp 97 °F (36.1 °C)   Resp 20   Wt 107 kg (236 lb)   SpO2 97%   BMI 38.09 kg/m²    Body  vision and vision changes.   Respiratory Negative Dyspnea and wheezing.   Cardio Negative Chest pain, irregular heartbeat/palpitations and syncope.   GI Negative Abdominal pain and diarrhea.   Endocrine Negative Cold intolerance and heat intolerance.   Neuro Negative Tremors.   Psych Negative Anxiety and depression.   Integumentary Negative Frequent skin infections, pigment change and rash.   Hema/Lymph Negative Easy bleeding and easy bruising.           PHYSICAL EXAM    Wt 175 lb (79.4 kg)   BMI 29.12 kg/m²        Constitutional Normal Overall appearance - Normal.   Psychiatric Normal Orientation - Oriented to time, place, person & situation. Appropriate mood and affect.   Neck Exam Normal Inspection - Normal. Palpation - Normal. Parotid gland - Normal. Thyroid gland - Normal.   Eyes Normal Conjunctiva - Right: Normal, Left: Normal. Pupil - Right: Normal, Left: Normal. Fundus - Right: Normal, Left: Normal.   Neurological Normal Memory - Normal. Cranial nerves - Cranial nerves II through XII grossly intact.   Head/Face Normal Facial features - Normal. Eyebrows - Normal. Skull - Normal.        Nasopharynx Normal External nose - Normal. Lips/teeth/gums - Normal. Tonsils - Normal. Oropharynx - Normal.   Ears Normal Inspection - Right: Normal, Left: Normal. Canal - Right: Normal, Left: Normal. TM - Right: Normal, Left: Normal.   Skin Normal Inspection - Normal.        Lymph Detail Normal Submental. Submandibular. Anterior cervical. Posterior cervical. Supraclavicular.        Nose/Mouth/Throat Normal External nose - Normal. Lips/teeth/gums - Normal. Tonsils - Normal. Oropharynx - Normal.   Nose/Mouth/Throat Normal Nares - Right: Normal Left: Normal. Septum -Normal  Turbinates - Right: Normal, Left: Normal.     Medications - Current[5]  ASSESSMENT AND PLAN    1. Imbalance  - Neuro Referral - In [facility]  - Physical Therapy Referral - [place] Locations    2. Bilateral hearing loss, unspecified hearing loss type  Balance  mass index is 38.09 kg/m².    Physical exam:  Constitutional:  Well nourished, no distress;   Eyes:  Pupils are equal; conjunctivae pink  HENT:  Oral mucosa moist. No JVD . Carotid upstroke normal; no bruits heard.   Respiratory:  Normal effort, good air entry; no rales or wheezing heard.  Cardiac:  [place] nondisplaced; no parasternal lift; S1, S2 regular, no S3 or S4; systolic murmur .  Vascular:  Aorta not palpable, no abdominal bruits, DP 2+ b/l.  Abdomen:  Soft. No hepatomegaly. No hepatojugular reflux.  Musculoskeletal:  Gait stable, able to exercise  Skin:  trace edema. No bruising.  Neurologic:  Grossly nonfocal. Alert and oriented x3.  Psychiatric:  Mood and affect appropriate.    Labs:   Lab Results   Component Value Date    SODIUM 140 12/30/2024    POTASSIUM 4.0 12/30/2024    MG 2.0 06/17/2024    BUN 23 (H) 12/30/2024    CREATININE 1.14 (H) 12/30/2024    WBC 5.6 12/30/2024    HCT 33.9 (L) 12/30/2024    HGB 10.9 (L) 12/30/2024     12/30/2024    INR 1.2 05/26/2022    BNP 66 08/23/2017    CPK 55 10/01/2021    RAPDTR <0.02 09/29/2021    GLUCOSE 92 12/30/2024    HGBA1C 5.4 08/30/2017    TSH 1.530 05/17/2023    CHOLESTEROL 143 05/21/2024    HDL 71 05/21/2024    CALCLDL 52 05/21/2024    TRIGLYCERIDE 101 05/21/2024         EKG:   Sinus bradycardia   with 1st degree AV block   Incomplete left bundle branch block   -----------------------------------------------------------------------------------------------------------------    TTE/EDGAR:    * Normal left ventricular systolic function.    * No left ventricular regional wall motion abnormalities.    * Mildly increased left ventricular wall thickness.    * Grade II diastolic dysfunction of the left ventricle (pseudonormal filling  pattern).    * Severe aortic valve stenosis; mean gradient 40 mmHg, MANNY 0.9 cm2.    * Evidence of a high flow state.    * Mild mitral valve regurgitation.    * Compared with prior report dated 12/06/2023 Mean aortic valve gradient  study demonstrated essentially normal peripheral vestibular function but some findings suggestive of possible central etiology.  I did recommend that she initiate vestibular rehab and meet with Dr. Dejesus of neurology to discuss central etiologies of her imbalance.  Return to see me as needed.        This note was prepared using Dragon Medical voice recognition dictation software. As a result errors may occur. When identified these errors have been corrected. While every attempt is made to correct errors during dictation discrepancies may still exist    Víctor Caro MD    5/23/2025    11:52 PM         [1]   Social History  Socioeconomic History    Marital status:    Tobacco Use    Smoking status: Never    Smokeless tobacco: Never   Vaping Use    Vaping status: Never Used   Substance and Sexual Activity    Alcohol use: No    Drug use: No   Other Topics Concern    Pt has a pacemaker No    Pt has a defibrillator No    Reaction to local anesthetic No    Caffeine Concern No    Exercise No    Seat Belt Yes    Special Diet No    Stress Concern No    Weight Concern Yes   [2]   Family History  Problem Relation Age of Onset    Arthritis Father     Cancer Mother         uterine CA    Diabetes Son     Musculo-skelatal Disorder Son         scoliosis   [3]   Past Medical History:   Acute vulvitis    Allergic conjunctivitis    Cataract    Cataract of both eyes    Cataract of both eyes    Cataract of both eyes    Chronic right shoulder pain    Chronic right-sided headaches    Chronic right-sided headaches    Chronic right-sided headaches    Chronic upper back pain    Class 1 obesity due to excess calories with serious comorbidity and body mass index (BMI) of 32.0 to 32.9 in adult    Closed displaced fracture of olecranon process without intraarticular extension of right ulna    Dry eye syndrome    Dry eye syndrome of both eyes    Dry eye syndrome of both eyes    Dry eye syndrome of both eyes    Essential hypertension     External hemorrhoids    External hemorrhoids    External hemorrhoids    Foraminal stenosis of cervical region    Foraminal stenosis of cervical region    Foraminal stenosis of cervical region    Glaucoma    Hiatal hernia    Hiatal hernia    Hiatal hernia    High blood pressure    Melanosis coli    Melanosis coli    NSAID induced gastritis    NSAID induced gastritis    NSAID induced gastritis    Osteoarthritis    knee, hips    Psychophysiological insomnia    Redundant colon    Redundant colon    Redundant colon    Shingles    Sigmoid diverticulosis    Sigmoid diverticulosis    Sigmoid diverticulosis    Sigmoid diverticulosis    Smokers' cough (HCC)    Smokers' cough (HCC)    Tendinopathy of rotator cuff    Tendinopathy of rotator cuff    Tinnitus of both ears    Trigger point of neck    Trigger point of neck    Trigger point of neck    Trigger point of neck    Visual impairment    glasses   [4]   Past Surgical History:  Procedure Laterality Date          Colonoscopy  2010    external hemorrhoids, redundant/tortuous colon, sigmoid diverticulosis, and melanosis coli    Egd  2010    hiatal hernia and gastritis    Other surgical history Left 2013    L forearm ORIF   [5]   Current Outpatient Medications:     triamcinolone 0.1 % External Cream, APLIQUE AL AREA AFECTADA EN LA PIEL DOS VECES AL TONY AURY SE NECESITE, Disp: 60 g, Rfl: 3    azelastine 0.1 % Nasal Solution, 2 sprays by Nasal route 2 (two) times daily., Disp: 30 mL, Rfl: 0    timolol 0.5 % Ophthalmic Solution, Place 1 drop into both eyes daily., Disp: , Rfl:     montelukast 10 MG Oral Tab, Take 1 tablet (10 mg total) by mouth nightly., Disp: 90 tablet, Rfl: 3    loratadine 10 MG Oral Tab, Take 1 tablet (10 mg total) by mouth daily., Disp: 90 tablet, Rfl: 3    naproxen 500 MG Oral Tab, Take 1 tablet (500 mg total) by mouth 2 (two) times daily with meals., Disp: 180 tablet, Rfl: 1    lisinopril 20 MG Oral Tab, Take 1 tablet (20 mg  has  now increased from 29 to 40 mmHg.  -----------------------------------------------------------------------------------------------------------------     CATH:     Large caliber 1st diagonal with 95% ulcerated stenosis pre PCI    1st diagonal status post single MERCEDES to 3.2 mm guided by IVUS    Mid LAD with in stent restenosis and proximal edge 70% stenosis with abnormal DFR    RPDA lesion with 70% stenosis.    Moderate aortic valve stenosis, transaortic gradient 29.97 mmHg and MANNY 1.23 cm²    Normal LV stroke-volume index    Valvular arterial impedance mildly elevated at 4.1    Mild pulmonary hypertension with PA pressure 44/13 and mean 22 mmHg    Normal biventricular filling pressures  -----------------------------------------------------------------------------------------------------------------     CT:  TAVR:  Description:                                    Value:            Annulus size: 82 mm  Annulus size: 504 mm2  Annulus size: 21.5 x 29.7 mm  Annulus to LM height: 12.3 mm  Annulus to RCA height: 15.1 mm  Aortic valve angle: 52.9 degrees  LEFT sinus diameter: 31.4 x 32.0 mm  RIGHT sinus diameter: 26.9 mm   Max ascending 30 mm length: 30.0 mm  Max ascending aorta at 30 mm: 35.3 mm  Max ascending at 40 mm length: 39.9 mm  Max ascending aorta at 40 mm: 37.9 mm  Minimum abdominal aorta Diam: 8.7 x 19.5 mm  RIGHT TIP minimum Diam: 6.7 x 10.1 mm  RIGHT EIA minimum Diam: 6.7 x 9.2 mm  RIGHT Fem minimum Diam: 5.9 x 7.6 mm  LEFT TIP minimum Diam: 7.0 x 13.8 mm  LEFT EIA minimum Diam: 7.2 x 8.4 mm  LEFT Fem minimum Diam: 6.0 x 7.8 mm     Aortic valve calcium score: 485    -----------------------------------------------------------------------------------------------------------------     CAROTID:  1. Less than 50% stenosis of the bilateral internal carotid arteries.      ASSESSMENT/PLAN:      Ms. Lockhart is a 74 yo female with mod-severe symptomatic aortic stenosis, multivessel CAD, and paroxysmal atrial  fibrillation. Given the multifaceted nature of her heart disease SAVR is a superior option to TAVR. TAVR CT shows no evidence of aortopathy or aortic calcifications so I do not feel she needs an updated study.  We will plan LIMA-LAD bypass and possible SVG-PDA if distal target is adequate. She has had PCI to the diagonal and we will have to wait a minimum of 30 days before holding plavix per Dr. Musa preferences. Xarelto and plavix will be held preoperatively without bridging. She wears full dentures so dental clearance is not needed. I have recommended AVR (BIOPROSTHETIC), CABG, and MAZE/LAAA. We discussed all R/B/A. STS PROM= 3.04%. She would like to proceed.     All pertinent imaging and labs were reviewed and results given to patient.  All of the patient's questions were answered.    Dakota Be DO    Total time spent today on this visit is 30 minutes, which includes preparing to see the patient by reviewing prior records, obtaining and reviewing history, performing a physical exam, counseling the patient, documenting clinical information.  Results of Left Heart Catheterization, Carotid US, Echocardiogram, Chest CT, EKG and preop labs (CBC, CMP, TSH, INR, UA, MRSA, A1c, and Lipid panel) all reviewed in addition to reviewing this patient's case with their referring cardiologist, Dr. Mendoza.                    total) by mouth daily., Disp: 90 tablet, Rfl: 3    latanoprost 0.005 % Ophthalmic Solution, , Disp: , Rfl:     nystatin [id number] UNIT/GM External Powder, Use twice daily  to chest and groin, Disp: 60 g, Rfl: 6    nystatin-triamcinolone 100,000-0.1 Units/g-% External Ointment, Apply twice daily  Monday-Friday to affected areas of rash., Disp: 60 g, Rfl: 3    dorzolamide-timolol 2-0.5 % Ophthalmic Solution, , Disp: , Rfl:     loratadine 10 MG Oral Tab, Take 1 tablet (10 mg total) by mouth daily., Disp: , Rfl:     gabapentin 100 MG Oral Cap, Take 2 capsules (200 mg total) by mouth nightly. (Patient not taking: Reported on 5/23/2025), Disp: 180 capsule, Rfl: 0

## 2025-06-04 ENCOUNTER — TELEPHONE (OUTPATIENT)
Dept: PHYSICAL THERAPY | Facility: HOSPITAL | Age: 80
End: 2025-06-04

## 2025-06-05 ENCOUNTER — OFFICE VISIT (OUTPATIENT)
Dept: PHYSICAL THERAPY | Facility: HOSPITAL | Age: 80
End: 2025-06-05
Attending: OTOLARYNGOLOGY
Payer: MEDICARE

## 2025-06-05 DIAGNOSIS — R26.89 IMBALANCE: Primary | ICD-10-CM

## 2025-06-05 PROCEDURE — 97110 THERAPEUTIC EXERCISES: CPT

## 2025-06-05 PROCEDURE — 97163 PT EVAL HIGH COMPLEX 45 MIN: CPT

## 2025-06-05 NOTE — PROGRESS NOTES
VESTIBULAR EVALUATION:     Diagnosis:   Imbalance (R26.89) Patient:  Jesi Ashford (79 year old, female)        Referring Provider: Víctor Caro  Today's Date   6/5/2025    Precautions:  Fall Risk   Date of Evaluation: 06/05/25  Next MD visit: 8/13/25 with Dr. Dejesus  Date of Surgery: na     PATIENT SUMMARY     Summary of chief complaints: imbalance.  History of current condition: Imbalance when walking. \"Vertigo\" for 4 years, but after further investigation symptoms suggest imbalance when walking. Pt had normal peripheral vestibular function on the VNG, but did have several central findings. Previous audiogram demonstrates normal downsloping to near severe sensory hearing loss bilaterally. Pt was recommended to consult neurology to determine central etiology of the imbalance. Pt says when she walks, she loses her balances and goes from side to side. And in her house and getting out of bed she feels off balance. She has to grab onto something because she is going to lose her balance.   Pain level: current 0 /10 (on the right- back of the calf and into the foot; the pain in the leg can wake her up from her sleep. it a nerve pain or in the tendon; but she does get cramping in the legs also; when walking it can be in the right groin and buttock down the leg), at best 0 /10, at worst 7 /10    Occupation: retired   Leisure activities/Hobbies: enjoys reading   Prior level of function: lives on the first floor apartment; 8 stairs to enter with railings on both sides; lives with her song (upstairs); independent but is careful  Current limitations: walking, stair negotiation, prolonged sitting or standing, getting in and out of the tub or care.  Pt goals: improve her balance and reduce pain in the right leg    Falls: Yes fell 2 times. once last step of the steps, slipped and fell on the stairs. also slipped and fell in the bath tub and broke her arm, also fell going up the stairs. Also fell in her house in the  kitchen, but she did not break anything then. After falling from the stairs, she now feels a lot of imbalance that is getting worse.        Dizziness Handicap Inventory:  no time    Past medical history was reviewed with Jesi.    Imaging/Tests: VNG showed negative peripheral vestibular function. Soft central signs. MRI of the lumbar spine from February 2024. 1. Mild multilevel lumbar spine degenerative changes as detailed.  Findings result in mild-to-moderate spinal canal as well as minor bilateral neural foraminal stenosis at L4-L5.  Remaining lumbar levels do not demonstrate significant neural compromise. 2. Trace degenerative anterolisthesis of L4 relative to L5 and L5 relative to S1.   Jesi  has a past medical history of Acute vulvitis (11/29/2022), Allergic conjunctivitis, Cataract, Cataract of both eyes (06/13/2019), Cataract of both eyes (06/13/2019), Cataract of both eyes (06/13/2019), Chronic right shoulder pain (09/30/2021), Chronic right-sided headaches (09/27/2017), Chronic right-sided headaches (09/27/2017), Chronic right-sided headaches (09/27/2017), Chronic upper back pain (04/11/2017), Class 1 obesity due to excess calories with serious comorbidity and body mass index (BMI) of 32.0 to 32.9 in adult (05/03/2017), Closed displaced fracture of olecranon process without intraarticular extension of right ulna (02/29/2024), Dry eye syndrome, Dry eye syndrome of both eyes (06/13/2019), Dry eye syndrome of both eyes (06/13/2019), Dry eye syndrome of both eyes (06/13/2019), Essential hypertension, External hemorrhoids (12/11/2010), External hemorrhoids (12/11/2010), External hemorrhoids (12/11/2010), Foraminal stenosis of cervical region (09/30/2021), Foraminal stenosis of cervical region (09/30/2021), Foraminal stenosis of cervical region (09/30/2021), Glaucoma, Hiatal hernia (12/11/2010), Hiatal hernia (12/11/2010), Hiatal hernia (12/11/2010), High blood pressure, 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), Melanosis  coli (2010), NSAID induced gastritis (2021), NSAID induced gastritis (2021), NSAID induced gastritis (2021), Osteoarthritis, Psychophysiological insomnia (2018), Redundant colon (2010), Redundant colon (2010), Redundant colon (2010), Shingles, Sigmoid diverticulosis (2010), Sigmoid diverticulosis (2010), Sigmoid diverticulosis (2010), Sigmoid diverticulosis (2010), Smokers' cough (HCC) (2023), Smokers' cough (HCC) (2023), Tendinopathy of rotator cuff (2021), Tendinopathy of rotator cuff (2021), Tinnitus of both ears (10/02/2018), Trigger point of neck (2021), Trigger point of neck (2021), Trigger point of neck (2021), Trigger point of neck (2021), and Visual impairment.  She  has a past surgical history that includes other surgical history (Left, 2013);  (); egd (2010); and colonoscopy (2010).    ASSESSMENT  Jesi presents to physical therapy evaluation with primary c/o imbalance.. The results of the objective tests and measures show diminished L4 and L5 myotome strength right and antalgic and unsteady gait.. Functional deficits include but are not limited to walking, stair negotiation, prolonged sitting or standing, getting in and out of the tub or care.. Signs and symptoms are consistent with diagnosis of imbalance and possible L4/L5 myotomal weakness contributing to falls. Pt and PT discussed evaluation findings, pathology, POC and HEP.  Pt voiced understanding and performs HEP correctly without reported pain. Skilled Physical Therapy is medically necessary to address the above impairments and reach functional goals.    OBJECTIVE:          Neurological:    LE Dermatomes: intact (need to assess with shoes off next session)    LE Myotomes   L2 (hip flex): R 4-*/5; L 4+/5   L3 (knee extension): R 4-*/5; L 5/5   L4 (ankle DF): R 3/5; L 4+/5   L5 (great toe ext): R 3-/5; L  4+/5   S1 (PF and EV): R 4+/5; L 4+/5   S2 (knee flex): R 4*/5; L 5/5 (more pain with palpation to the Achilles tendon)     Today’s Treatment and Response:   Pt education was provided on exam findings, treatment diagnosis, treatment plan, expectations, and prognosis.     Today's Treatment       6/5/2025   Vestibular Treatment   Therapeutic Exercise Pt edu- anatomy of the spine and possibility of radicular pain causing right foot weakness leading to falls.   Therapeutic Exercise Minutes 15   Evaluation Minutes 30   Total Time Of Timed Procedures 15   Total Time Of Service-Based Procedures 30   Total Treatment Time 45   HEP none        Patient was instructed in and issued a HEP for: none  Pt was also provided recommendations for: symptom management.    Charges:  PT EVAL: High Complexity, 1 TE  In agreement with evaluation findings and clinical rationale, this evaluation involved HIGH COMPLEXITY decision making due to 3 or more personal factors/comorbidities, 4 or more body structures involved/activity limitations, and unstable symptoms as documented in the evaluation.       PLAN OF CARE:      Goals: (to be met in 8 visits)   Patient will demonstrate adequate functional strength and stability to perform daily transfers like sit <>stand, sit<>supine, car transfers, floor transfers, and shower transfers independently.  Patient will improve TUG testing by TBD seconds with LRAD in order to demonstrate improved gait mechanics for navigating around the home with decreased risk of falls.   Patient will improve 5xSTS testing by TBD seconds without use of UE in order to demonstrate adequate strength and stability to get up from a recliner/low couch without assistance.   Patient will be independent and compliant with HEP in order to maintain functional gains seen in therapy sessions.       Frequency / Duration: Patient will be seen 1-2x/week or a total of 8 visits over a 90 day period. Treatment will include: neuromuscular  re-education; balance training; symptom management instruction; therapeutic exercise; manual therapy; patient education    Education or treatment limitation: None   Rehab Potential: fair     Patient/Family/Caregiver was advised of these findings, precautions, and treatment options and has agreed to actively participate in planning and for this course of care.     Thank you for your referral. Please co-sign or sign and return this letter via fax as soon as possible to [phone]. If you have any questions, please contact me at Dept: [phone]    Sincerely,  Electronically signed by therapist: Arsenio Sweeney PT  Physician's certification required: Yes  I certify the need for these services furnished under this plan of treatment and while under my care.    X___________________________________________________ Date____________________    Certification From: 6/5/2025  To: 9/3/2025

## 2025-06-10 ENCOUNTER — APPOINTMENT (OUTPATIENT)
Dept: PHYSICAL THERAPY | Facility: HOSPITAL | Age: 80
End: 2025-06-10
Attending: OTOLARYNGOLOGY
Payer: MEDICARE

## 2025-06-13 ENCOUNTER — OFFICE VISIT (OUTPATIENT)
Dept: PHYSICAL THERAPY | Facility: HOSPITAL | Age: 80
End: 2025-06-13
Attending: OTOLARYNGOLOGY
Payer: MEDICARE

## 2025-06-13 PROCEDURE — 97112 NEUROMUSCULAR REEDUCATION: CPT

## 2025-06-13 NOTE — PROGRESS NOTES
Patient: Jesi Sheridan Mastache (79 year old, female) Referring Provider:  Insurance:   Diagnosis: Imbalance (R26.89) Víctor GARSIA [facility]   Date of Surgery: na Next MD visit:  N/A   Precautions:  Fall Risk 8/13/25 with Dr. Dejesus Referral Information:    Date of Evaluation: Req: 8, Auth: 8, Exp: 9/5/2025 06/05/25 POC Auth Visits:  8       Today's Date   6/13/2025    Subjective  Sometimes pt feels an electric shock in her tongue while sleeping. Believe it is related to the nerve. Denies the dizzy, spinning sensation.       Pain: pain not reported     Objective  see flowsheet            Assessment  No peripheral findings on the oculomotor or positional exam. She did have spontaneous downbeating with fixation blocked, worsened in bilateral marta hallpike. Balance was impaired as well. Unsure if that is related to a central issue or from lumbar radic. Will send a message to Dr. Behar (treated her spine in 2024) and Dr. Dejesus to see if she can get an earlier appointment.    Goals (to be met in 8 visits)   Patient will demonstrate adequate functional strength and stability to perform daily transfers like sit <>stand, sit<>supine, car transfers, floor transfers, and shower transfers independently.  Patient will improve TUG testing by 8 seconds with LRAD in order to demonstrate improved gait mechanics for navigating around the home with decreased risk of falls.   Patient will improve 5xSTS testing by 10 seconds without use of UE in order to demonstrate adequate strength and stability to get up from a recliner/low couch without assistance.   Patient will be independent and compliant with HEP in order to maintain functional gains seen in therapy sessions.         Plan  relieve nerve tension and improve balance    Treatment Last 4 Visits  Treatment Day: 2       6/5/2025 6/13/2025   Vestibular Treatment   Therapeutic Exercise Pt edu- anatomy of the spine and possibility of radicular pain causing right foot weakness  Pt discharged home with parent/guardian. Pt acting age appropriately, respirations regular and unlabored, cap refill less than two seconds. Parent/guardian verbalized understanding of discharge paperwork and has no further questions at this time. Mother of patient given discharge instructions. Patient ambulatory out of the department with mother. Education:  Instructed mother on tylenol and motrin dosing and schedule. Encourage clear liquids. Mother verbalizes need to bring back to ED for worsening symptoms. Reassessment:  Patient continues with fever but evaluated, treated, and discharged for/with same. Patient able to tolerate po intake. leading to falls.    Neuro Re-Education  Oculomotor and balance exam:  Saccadic intrusions with smooth pursuit, slow saccades, mild saccadic intrusions with VOR cancellation, and spontaneous downbeating with fixation blocked. Downbeating nystagmus intensified in bilateral marta hallpike positions. Supine roll was negative.     Positional exam: pt was able to stand romberg EC for 10 seconds but was leaning heavily to the right. Also leaned right with EO. Tandem was 6-7 sec bilaterally. Unable eyes closed. SLS 2 sec and unable to with eyes closed bilaterally. TUG completed in 20 sec. 5xSTS in 32.63 sec    Slump + bilaterally     HEP update        Therapeutic Exercise Minutes 15    Neuro Re-Educ Minutes  50   Evaluation Minutes 30    Total Time Of Timed Procedures 15 50   Total Time Of Service-Based Procedures 30 0   Total Treatment Time 45 50   HEP none - Seated Hamstring Stretch  - 2 x daily - 7 x weekly - 3-4 sets - 5-10 sec hold  - Slump Stretch  - 1-2 x daily - 7 x weekly - 1-2 sets - 10 reps        HEP  - Seated Hamstring Stretch  - 2 x daily - 7 x weekly - 3-4 sets - 5-10 sec hold  - Slump Stretch  - 1-2 x daily - 7 x weekly - 1-2 sets - 10 reps    Charges  3 NMR

## 2025-06-17 ENCOUNTER — OFFICE VISIT (OUTPATIENT)
Dept: PHYSICAL THERAPY | Facility: HOSPITAL | Age: 80
End: 2025-06-17
Attending: OTOLARYNGOLOGY
Payer: MEDICARE

## 2025-06-17 PROCEDURE — 97110 THERAPEUTIC EXERCISES: CPT

## 2025-06-17 NOTE — PROGRESS NOTES
Patient: Jesi Sheridan Mastache (79 year old, female) Referring Provider:  Insurance:   Diagnosis: Imbalance (R26.89) Víctor GARSIA [facility]   Date of Surgery: na Next MD visit:  N/A   Precautions:  Fall Risk 8/13/25 with Dr. Dejesus Referral Information:    Date of Evaluation: Req: 8, Auth: 8, Exp: 9/5/2025 06/05/25 POC Auth Visits:  8       Today's Date   6/17/2025    Subjective  Pt continues to report an electric shock to her tongue, is does not happen every day can happen at any moment.       Pain: pain not reported     Objective  see flowsheet            Assessment  Since pt does not have vestibular dysfunction- focused on relieving back pain and nerve irritation to promote improved balance. Will focus more on balance exercises next session.    Goals (to be met in 8 visits)   Patient will demonstrate adequate functional strength and stability to perform daily transfers like sit <>stand, sit<>supine, car transfers, floor transfers, and shower transfers independently.  Patient will improve TUG testing by 8 seconds with LRAD in order to demonstrate improved gait mechanics for navigating around the home with decreased risk of falls.   Patient will improve 5xSTS testing by 10 seconds without use of UE in order to demonstrate adequate strength and stability to get up from a recliner/low couch without assistance.   Patient will be independent and compliant with HEP in order to maintain functional gains seen in therapy sessions.           Plan  relieve nerve tension and improve balance    Treatment Last 4 Visits  Treatment Day: 3       6/5/2025 6/13/2025 6/17/2025   Vestibular Treatment   Therapeutic Exercise Pt edu- anatomy of the spine and possibility of radicular pain causing right foot weakness leading to falls.  Pt edu and info for Dr. Behar and Neurology scheduling    SKTC stretch 5\" hold x5 bilat  Piriformis stretch 5x10\" hold  HL 90/90 sciatic nerve glide x10 bilat  Seated HS stretch stretch 3x10\"  bilat  Slantboard stretch 5x10\"     HEP update and pt education    Neuro Re-Education  Oculomotor and balance exam:  Saccadic intrusions with smooth pursuit, slow saccades, mild saccadic intrusions with VOR cancellation, and spontaneous downbeating with fixation blocked. Downbeating nystagmus intensified in bilateral marta hallpike positions. Supine roll was negative.     Positional exam: pt was able to stand romberg EC for 10 seconds but was leaning heavily to the right. Also leaned right with EO. Tandem was 6-7 sec bilaterally. Unable eyes closed. SLS 2 sec and unable to with eyes closed bilaterally. TUG completed in 20 sec. 5xSTS in 32.63 sec    Slump + bilaterally     HEP update         Therapeutic Exercise Minutes 15  43   Neuro Re-Educ Minutes  50    Evaluation Minutes 30     Total Time Of Timed Procedures 15 50 43   Total Time Of Service-Based Procedures 30 0 0   Total Treatment Time 45 50 43   HEP none - Seated Hamstring Stretch  - 2 x daily - 7 x weekly - 3-4 sets - 5-10 sec hold  - Slump Stretch  - 1-2 x daily - 7 x weekly - 1-2 sets - 10 reps - Hooklying Single Knee to Chest  - 1 x daily - 7 x weekly - 10 reps - 5 segundos hold  - Supine Piriformis Stretch with Foot on Ground  - 1 x daily - 7 x weekly - 5 reps - 10 segundos hold  - Supine 90/90 Sciatic Nerve Glide with Knee Flexion/Extension  - 1 x daily - 7 x weekly - 1-2 sets - 10 reps  - Seated Hamstring Stretch  - 1 x daily - 7 x weekly - 3-5 reps - 10 segundos hold  - Standing Bilateral Gastroc Stretch with Step  - 1 x daily - 7 x weekly - 5 reps - 10 segundos hold        HEP  - Hooklying Single Knee to Chest  - 1 x daily - 7 x weekly - 10 reps - 5 segundos hold  - Supine Piriformis Stretch with Foot on Ground  - 1 x daily - 7 x weekly - 5 reps - 10 segundos hold  - Supine 90/90 Sciatic Nerve Glide with Knee Flexion/Extension  - 1 x daily - 7 x weekly - 1-2 sets - 10 reps  - Seated Hamstring Stretch  - 1 x daily - 7 x weekly - 3-5 reps - 10 segundos  hold  - Standing Bilateral Gastroc Stretch with Step  - 1 x daily - 7 x weekly - 5 reps - 10 segundos hold    Charges  3 TE

## 2025-06-19 RX ORDER — NAPROXEN 500 MG/1
500 TABLET ORAL 2 TIMES DAILY WITH MEALS
Qty: 180 TABLET | Refills: 0 | Status: SHIPPED | OUTPATIENT
Start: 2025-06-19

## 2025-06-20 NOTE — TELEPHONE ENCOUNTER
Refill Passed Per Protocol    Requested Prescriptions   Pending Prescriptions Disp Refills    NAPROXEN 500 MG Oral Tab [Pharmacy Med Name: Naproxen Oral Tablet 500 MG] 180 tablet 0     Sig: TOME 1 TABLETA DOS VECES AL TONY CON COMIDAS       Non-Narcotic Pain Medication Protocol Passed - 6/19/2025 11:28 PM        Passed - In person appointment or virtual visit in the past 6 mos or appointment in next 3 mos     Recent Outpatient Visits              2 days ago     [facility] Arsenio Sweeney, PT    Office Visit    6 days ago     [facility] Arsenio Sweeney, PT    Office Visit    2 weeks ago [facility] Arsenio Sweeney, PT    Office Visit    3 weeks ago [facility] Víctor Caro MD    Office Visit    1 month ago [facility] Shelley Lopez AUD    Office Visit          Future Appointments         Provider Department Appt Notes    In 5 days Arsenio Sweeney PT [facility] 8 visits 6/5 to 9/5  [employer] MA  c/p $25    In 1 week Arsenio Sweeney [facility] 8 visits 6/5 to 9/5  [employer] MA  c/p $25    In 2 weeks Arsenio Sweeney PT [facility] 8 visits 6/5 to 9/5  [employer] MA  c/p $25    In 4 weeks Arsenio Sweeney [facility] 8 visits 6/5 to 9/5  [employer] MA  c/p $25    In 1 month Arsenio Sweeney [facility] 8 visits 6/5 to 9/5  [employer] MA  c/p $25    In 1 month Arsenio Sweeney [facility] Needs QNR  8 visits 6/5 to 9/5  [employer] MA  c/p $25    In 1 month Ilan Dejesus DO [facility] rfd by Dr Caro/ [place]/ [facility]                    Passed - Medication is active on med list             Future Appointments         Provider Department Appt Notes     In 5 days Arsenio Sweeney, PT [facility] 8 visits 6/5 to 9/5  [employer] MA  c/p $25    In 1 week Arsenio Seweney, PT [facility] 8 visits 6/5 to 9/5  [employer] MA  c/p $25    In 2 weeks Arsenio Sweeney, PT [facility] 8 visits 6/5 to 9/5  [employer] MA  c/p $25    In 4 weeks Arsenio Sweeney, PT [facility] 8 visits 6/5 to 9/5  [employer] MA  c/p $25    In 1 month Arsenio Sweeney, PT [facility] 8 visits 6/5 to 9/5  [employer] MA  c/p $25    In 1 month Arsenio Sweeney, PT [facility] Needs QNR  8 visits 6/5 to 9/5  [employer] MA  c/p $25    In 1 month Ilan Dejesus,  [facility] rfd by Dr Caro/ Od[place]/ [facility] ma o          Recent Outpatient Visits              2 days ago     [facility] Arsenio Sweeney, PT    Office Visit    6 days ago     [facility] Arsenio Sweeney, PT    Office Visit    2 weeks ago [facility] Arseino Sweeney, PT    Office Visit    3 weeks ago Imbalance    [facility] Víctor Caro MD    Office Visit    1 month ago Dizziness    [facility] Shelley Lopez, SERGIO    Office Visit

## 2025-06-24 ENCOUNTER — OFFICE VISIT (OUTPATIENT)
Dept: PHYSICAL THERAPY | Facility: HOSPITAL | Age: 80
End: 2025-06-24
Attending: OTOLARYNGOLOGY
Payer: MEDICARE

## 2025-06-24 PROCEDURE — 97112 NEUROMUSCULAR REEDUCATION: CPT

## 2025-06-24 PROCEDURE — 97110 THERAPEUTIC EXERCISES: CPT

## 2025-06-24 NOTE — PROGRESS NOTES
Patient: Jesi Ashford (79 year old, female) Referring Provider:  Insurance:   Diagnosis: Imbalance (R26.89) Víctor GARSIA [facility]   Date of Surgery: na Next MD visit:  N/A   Precautions:  Fall Risk 8/13/25 with Dr. Dejesus Referral Information:    Date of Evaluation: Req: 8, Auth: 8, Exp: 9/5/2025 06/05/25 POC Auth Visits:  8       Today's Date   6/24/2025    Subjective  Pt feels more pain in the low back and hip, knees and the calves and feet. Feels very heavy when going up stairs and pain in the back. Pt feels the sensation from her hips down and goes all over her legs. She would know if it is in the bone or lower leg, but it is not an acute pain but is mild. Can be very upsetting and this is likely triggering the weakness in her legs.       Pain: pain not reported     Objective  see flowsheet            Assessment  Pt continues to report symptoms in the low back, hips and legs. Believe this is related to the spine, therefore recommend pt call Dr. Behar's office to schedule a follow up. Will plan to focus solely on balance training, as this is another complaint.    Goals (to be met in 8 visits)   Patient will demonstrate adequate functional strength and stability to perform daily transfers like sit <>stand, sit<>supine, car transfers, floor transfers, and shower transfers independently.  Patient will improve TUG testing by 8 seconds with LRAD in order to demonstrate improved gait mechanics for navigating around the home with decreased risk of falls.   Patient will improve 5xSTS testing by 10 seconds without use of UE in order to demonstrate adequate strength and stability to get up from a recliner/low couch without assistance.   Patient will be independent and compliant with HEP in order to maintain functional gains seen in therapy sessions.             Plan  refer to Dr. Behar to address lumbosacral pain. focus on balance training.    Treatment Last 4 Visits  Treatment Day: 4       6/5/2025  6/13/2025 6/17/2025 6/24/2025   Vestibular Treatment   Therapeutic Exercise Pt edu- anatomy of the spine and possibility of radicular pain causing right foot weakness leading to falls.  Pt edu and info for Dr. Behar and Neurology scheduling    SKTC stretch 5\" hold x5 bilat  Piriformis stretch 5x10\" hold  HL 90/90 sciatic nerve glide x10 bilat  Seated HS stretch stretch 3x10\" bilat  Slantboard stretch 5x10\"     HEP update and pt education  Discussing symptoms and recommendation for scheduling with Dr. Behar for the lumbosacral pain and possible referred LE pain/weakness.     Nustep lvl 3 legs and arms x5'    Neuro Re-Education  Oculomotor and balance exam:  Saccadic intrusions with smooth pursuit, slow saccades, mild saccadic intrusions with VOR cancellation, and spontaneous downbeating with fixation blocked. Downbeating nystagmus intensified in bilateral marta hallpike positions. Supine roll was negative.     Positional exam: pt was able to stand romberg EC for 10 seconds but was leaning heavily to the right. Also leaned right with EO. Tandem was 6-7 sec bilaterally. Unable eyes closed. SLS 2 sec and unable to with eyes closed bilaterally. TUG completed in 20 sec. 5xSTS in 32.63 sec    Slump + bilaterally     HEP update       Step taps 4\" 2x10   6\" hurdles step to, lateral and reciprocal x3 ea  Mob romberg on blue airex  +head turns    Therapeutic Exercise Minutes 15  43 25   Neuro Re-Educ Minutes  50  18   Evaluation Minutes 30      Total Time Of Timed Procedures 15 50 43 43   Total Time Of Service-Based Procedures 30 0 0 0   Total Treatment Time 45 50 43 43   HEP none - Seated Hamstring Stretch  - 2 x daily - 7 x weekly - 3-4 sets - 5-10 sec hold  - Slump Stretch  - 1-2 x daily - 7 x weekly - 1-2 sets - 10 reps - Hooklying Single Knee to Chest  - 1 x daily - 7 x weekly - 10 reps - 5 segundos hold  - Supine Piriformis Stretch with Foot on Ground  - 1 x daily - 7 x weekly - 5 reps - 10 segundos hold  - Supine 90/90  Sciatic Nerve Glide with Knee Flexion/Extension  - 1 x daily - 7 x weekly - 1-2 sets - 10 reps  - Seated Hamstring Stretch  - 1 x daily - 7 x weekly - 3-5 reps - 10 segundos hold  - Standing Bilateral Gastroc Stretch with Step  - 1 x daily - 7 x weekly - 5 reps - 10 segundos hold         HEP  - Hooklying Single Knee to Chest  - 1 x daily - 7 x weekly - 10 reps - 5 segundos hold  - Supine Piriformis Stretch with Foot on Ground  - 1 x daily - 7 x weekly - 5 reps - 10 segundos hold  - Supine 90/90 Sciatic Nerve Glide with Knee Flexion/Extension  - 1 x daily - 7 x weekly - 1-2 sets - 10 reps  - Seated Hamstring Stretch  - 1 x daily - 7 x weekly - 3-5 reps - 10 segundos hold  - Standing Bilateral Gastroc Stretch with Step  - 1 x daily - 7 x weekly - 5 reps - 10 segundos hold    Charges  2 TE 1 NMR

## 2025-06-26 ENCOUNTER — TELEPHONE (OUTPATIENT)
Dept: PHYSICAL MEDICINE AND REHAB | Facility: CLINIC | Age: 80
End: 2025-06-26

## 2025-06-26 NOTE — TELEPHONE ENCOUNTER
Received message from PSR: \"Needs a sooner appt with Dr Behar\"       No appt was scheduled for patient prior to routing this. Patient has HMO and needs to be scheduled for an appointment then added to the wait list. There are no emergency slots available at this time.     LOV: 06/11/2024.

## 2025-06-26 NOTE — TELEPHONE ENCOUNTER
BERYLTCB to schedule appt with Dr. Behar at next available opening. Pt can be added to appt wait list

## 2025-06-26 NOTE — TELEPHONE ENCOUNTER
Patient calling to schedule a f/u appointment after PT with Dr.Behar, she has been added on the the schedule on 9/11/25 at 11:20 AM but wants to see if there is a possibility that Dr.Behar can see her early next month as her PT will be completed at the end June. Please call to advise.

## 2025-07-01 ENCOUNTER — OFFICE VISIT (OUTPATIENT)
Dept: PHYSICAL THERAPY | Facility: HOSPITAL | Age: 80
End: 2025-07-01
Attending: OTOLARYNGOLOGY
Payer: MEDICARE

## 2025-07-01 PROCEDURE — 97112 NEUROMUSCULAR REEDUCATION: CPT

## 2025-07-01 PROCEDURE — 97110 THERAPEUTIC EXERCISES: CPT

## 2025-07-01 NOTE — PROGRESS NOTES
Patient: Jesi Sheridan Mastache (79 year old, female) Referring Provider:  Insurance:   Diagnosis: Imbalance (R26.89) Víctor GARSIA [facility]   Date of Surgery: na Next MD visit:  N/A   Precautions:  Fall Risk 8/13/25 with Dr. Dejesus Referral Information:    Date of Evaluation: Req: 8, Auth: 8, Exp: 9/5/2025 06/05/25 POC Auth Visits:  8       Today's Date   7/1/2025    Subjective  Pt is unable to see Dr. Behar until September. She is on the waitlist though. Pt feels better but with the changes in weather it may affect the joints.       Pain: pain not reported     Objective  see flowsheet          Assessment  Pt was fatigued by the end of the session but felt good. No overt LOB today.    Goals (to be met in 8 visits)   Patient will demonstrate adequate functional strength and stability to perform daily transfers like sit <>stand, sit<>supine, car transfers, floor transfers, and shower transfers independently.  Patient will improve TUG testing by 8 seconds with LRAD in order to demonstrate improved gait mechanics for navigating around the home with decreased risk of falls.   Patient will improve 5xSTS testing by 10 seconds without use of UE in order to demonstrate adequate strength and stability to get up from a recliner/low couch without assistance.   Patient will be independent and compliant with HEP in order to maintain functional gains seen in therapy sessions.               Plan  focus on balance training and endurance    Treatment Last 4 Visits  Treatment Day: 5       6/13/2025 6/17/2025 6/24/2025 7/1/2025   Vestibular Treatment   Therapeutic Exercise  Pt edu and info for Dr. Behar and Neurology scheduling    SKTC stretch 5\" hold x5 bilat  Piriformis stretch 5x10\" hold  HL 90/90 sciatic nerve glide x10 bilat  Seated HS stretch stretch 3x10\" bilat  Slantboard stretch 5x10\"     HEP update and pt education  Discussing symptoms and recommendation for scheduling with Dr. Behar for the lumbosacral pain and  possible referred LE pain/weakness.     Nustep lvl 3 legs and arms x5'  Nustep lvl 3 legs and arms x5'     Shuttle DLP 62# x20    4\" step up 2UE use x10 bilat    HEP update     Neuro Re-Education Oculomotor and balance exam:  Saccadic intrusions with smooth pursuit, slow saccades, mild saccadic intrusions with VOR cancellation, and spontaneous downbeating with fixation blocked. Downbeating nystagmus intensified in bilateral marta hallpike positions. Supine roll was negative.     Positional exam: pt was able to stand romberg EC for 10 seconds but was leaning heavily to the right. Also leaned right with EO. Tandem was 6-7 sec bilaterally. Unable eyes closed. SLS 2 sec and unable to with eyes closed bilaterally. TUG completed in 20 sec. 5xSTS in 32.63 sec    Slump + bilaterally     HEP update       Step taps 4\" 2x10   6\" hurdles step to, lateral and reciprocal x3 ea  Mob romberg on blue airex  +head turns  4\" FSUO x10 ea  4\" LSUO x10 ea    Tiltboard ML and PA x2 ea (middle level)       Therapeutic Exercise Minutes  43 25 28   Neuro Re-Educ Minutes 50  18 15   Total Time Of Timed Procedures 50 43 43 43   Total Time Of Service-Based Procedures 0 0 0 0   Total Treatment Time 50 43 43 43   HEP - Seated Hamstring Stretch  - 2 x daily - 7 x weekly - 3-4 sets - 5-10 sec hold  - Slump Stretch  - 1-2 x daily - 7 x weekly - 1-2 sets - 10 reps - Hooklying Single Knee to Chest  - 1 x daily - 7 x weekly - 10 reps - 5 segundos hold  - Supine Piriformis Stretch with Foot on Ground  - 1 x daily - 7 x weekly - 5 reps - 10 segundos hold  - Supine 90/90 Sciatic Nerve Glide with Knee Flexion/Extension  - 1 x daily - 7 x weekly - 1-2 sets - 10 reps  - Seated Hamstring Stretch  - 1 x daily - 7 x weekly - 3-5 reps - 10 segundos hold  - Standing Bilateral Gastroc Stretch with Step  - 1 x daily - 7 x weekly - 5 reps - 10 segundos hold  - Full Leg Press   - Lateral Step Ups  - 2 sets - 10 reps  - Step Up  - 7 x weekly - 2 sets - 10 reps  -  Standing Ankle Supination Pronation on Rocker Board with Counter Support  - 30 reps  - Standing Ankle Plantar Flexion Dorsiflexion on Rocker Board with Unilateral Counter Support  - 30 reps        HEP  - Full Leg Press   - Lateral Step Ups  - 2 sets - 10 reps  - Step Up  - 7 x weekly - 2 sets - 10 reps  - Standing Ankle Supination Pronation on Rocker Board with Counter Support  - 30 reps  - Standing Ankle Plantar Flexion Dorsiflexion on Rocker Board with Unilateral Counter Support  - 30 reps    Charges  2 TE 1 NMR

## 2025-07-03 ENCOUNTER — OFFICE VISIT (OUTPATIENT)
Dept: PHYSICAL THERAPY | Facility: HOSPITAL | Age: 80
End: 2025-07-03
Attending: OTOLARYNGOLOGY
Payer: MEDICARE

## 2025-07-03 PROCEDURE — 97110 THERAPEUTIC EXERCISES: CPT

## 2025-07-03 PROCEDURE — 97112 NEUROMUSCULAR REEDUCATION: CPT

## 2025-07-03 NOTE — PROGRESS NOTES
Patient: Jesi Ashford (79 year old, female) Referring Provider:  Insurance:   Diagnosis: Imbalance (R26.89) Víctor GARSIA [facility]   Date of Surgery: na Next MD visit:  N/A   Precautions:  Fall Risk 8/13/25 with Dr. Dejesus Referral Information:    Date of Evaluation: Req: 8, Auth: 8, Exp: 9/5/2025 06/05/25 POC Auth Visits:  8       Today's Date   7/3/2025    Subjective  Pt says she is fine today. Pt felt more pain in the back and hips after last session. Has mild pain today as well. Says the legs feel better the past few days.       Pain: 5/10     Objective  see flowsheet            Assessment  Practiced using a cane with ambulation. Improved mechanics while holding the cane in the left UE vs the right. Still presents with imbalance but this was unfamiliar to the pt. Introduced glute sets to promote glute activation vs overactivation of the spinal extensors.    Goals (to be met in 8 visits)   Patient will demonstrate adequate functional strength and stability to perform daily transfers like sit <>stand, sit<>supine, car transfers, floor transfers, and shower transfers independently.  Patient will improve TUG testing by 8 seconds with LRAD in order to demonstrate improved gait mechanics for navigating around the home with decreased risk of falls.   Patient will improve 5xSTS testing by 10 seconds without use of UE in order to demonstrate adequate strength and stability to get up from a recliner/low couch without assistance.   Patient will be independent and compliant with HEP in order to maintain functional gains seen in therapy sessions.                 Plan  focus on balance training and endurance    Treatment Last 4 Visits  Treatment Day: 6       6/17/2025 6/24/2025 7/1/2025 7/3/2025   Vestibular Treatment   Therapeutic Exercise Pt edu and info for Dr. Behar and Neurology scheduling    SKTC stretch 5\" hold x5 bilat  Piriformis stretch 5x10\" hold  HL 90/90 sciatic nerve glide x10 bilat  Seated HS  stretch stretch 3x10\" bilat  Slantboard stretch 5x10\"     HEP update and pt education  Discussing symptoms and recommendation for scheduling with Dr. Behar for the lumbosacral pain and possible referred LE pain/weakness.     Nustep lvl 3 legs and arms x5'  Nustep lvl 3 legs and arms x5'     Shuttle DLP 62# x20    4\" step up 2UE use x10 bilat    HEP update   Shuttle DLP 55# 2x15  Shuttle SLP 30# 2x10 bilat    HL glute set and standing glute set  -pt edu   Neuro Re-Education  Step taps 4\" 2x10   6\" hurdles step to, lateral and reciprocal x3 ea  Mob romberg on blue airex  +head turns  4\" FSUO x10 ea  4\" LSUO x10 ea    Tiltboard ML and PA x2 ea (middle level)     Ambulating with SPC in R and L UE   -improved balance with cane in the left UE    Therapeutic Exercise Minutes 43 25 28 20   Neuro Re-Educ Minutes  18 15 25   Total Time Of Timed Procedures 43 43 43 45   Total Time Of Service-Based Procedures 0 0 0 0   Total Treatment Time 43 43 43 45   HEP - Hooklying Single Knee to Chest  - 1 x daily - 7 x weekly - 10 reps - 5 segundos hold  - Supine Piriformis Stretch with Foot on Ground  - 1 x daily - 7 x weekly - 5 reps - 10 segundos hold  - Supine 90/90 Sciatic Nerve Glide with Knee Flexion/Extension  - 1 x daily - 7 x weekly - 1-2 sets - 10 reps  - Seated Hamstring Stretch  - 1 x daily - 7 x weekly - 3-5 reps - 10 segundos hold  - Standing Bilateral Gastroc Stretch with Step  - 1 x daily - 7 x weekly - 5 reps - 10 segundos hold  - Full Leg Press   - Lateral Step Ups  - 2 sets - 10 reps  - Step Up  - 7 x weekly - 2 sets - 10 reps  - Standing Ankle Supination Pronation on Rocker Board with Counter Support  - 30 reps  - Standing Ankle Plantar Flexion Dorsiflexion on Rocker Board with Unilateral Counter Support  - 30 reps Glute sets        HEP  Glute sets    Charges  1 TE 2 NMR

## 2025-07-08 ENCOUNTER — APPOINTMENT (OUTPATIENT)
Dept: PHYSICAL THERAPY | Facility: HOSPITAL | Age: 80
End: 2025-07-08
Attending: OTOLARYNGOLOGY
Payer: MEDICARE

## 2025-07-10 ENCOUNTER — OFFICE VISIT (OUTPATIENT)
Dept: PHYSICAL THERAPY | Facility: HOSPITAL | Age: 80
End: 2025-07-10
Attending: OTOLARYNGOLOGY
Payer: MEDICARE

## 2025-07-10 PROCEDURE — 97112 NEUROMUSCULAR REEDUCATION: CPT

## 2025-07-10 PROCEDURE — 97110 THERAPEUTIC EXERCISES: CPT

## 2025-07-10 NOTE — PROGRESS NOTES
Patient: Jesi Ashford (79 year old, female) Referring Provider:  Insurance:   Diagnosis: Imbalance (R26.89) Víctor GARSIA [facility]   Date of Surgery: na Next MD visit:  N/A   Precautions:  Fall Risk 8/13/25 with Dr. Dejesus Referral Information:    Date of Evaluation: Req: 8, Auth: 8, Exp: 9/5/2025 06/05/25 POC Auth Visits:  8       Today's Date   7/10/2025    Subjective  Pt said she had more pain in the legs and hip. Right knee started hurting. Pain has been present some time but started hurting again yesterday. She feels more pain today. She also feels the legs are weaker. It's not the muscle thats hurting it's just bothersome. Pt has not been walking much, not sure how her balance has been. She's been walking in her house, but she mostly just feels pain. Right is worse than the left.       Pain: 4/10     Objective  see flowsheet            Assessment  Pt primarily complains of pain in the legs vs a true balance issue. Next session is her 8th visit, recommend DC and for pt to return to the gym.    Goals (to be met in 8 visits)   Patient will demonstrate adequate functional strength and stability to perform daily transfers like sit <>stand, sit<>supine, car transfers, floor transfers, and shower transfers independently.  Patient will improve TUG testing by 8 seconds with LRAD in order to demonstrate improved gait mechanics for navigating around the home with decreased risk of falls.   Patient will improve 5xSTS testing by 10 seconds without use of UE in order to demonstrate adequate strength and stability to get up from a recliner/low couch without assistance.   Patient will be independent and compliant with HEP in order to maintain functional gains seen in therapy sessions.                   Plan  plan for DC next session. Pt should follow up with physiatry prior to returning to PT.    Treatment Last 4 Visits  Treatment Day: 7       6/24/2025 7/1/2025 7/3/2025 7/10/2025   Vestibular Treatment    Therapeutic Exercise Discussing symptoms and recommendation for scheduling with Dr. Behar for the lumbosacral pain and possible referred LE pain/weakness.     Nustep lvl 3 legs and arms x5'  Nustep lvl 3 legs and arms x5'     Shuttle DLP 62# x20    4\" step up 2UE use x10 bilat    HEP update   Shuttle DLP 55# 2x15  Shuttle SLP 30# 2x10 bilat    HL glute set and standing glute set  -pt edu Nustep lvl 2 legs only x5'    Shuttle DLP 67# 2x10  Shuttle SLP 42# x15 bilat         Neuro Re-Education Step taps 4\" 2x10   6\" hurdles step to, lateral and reciprocal x3 ea  Mob romberg on blue airex  +head turns  4\" FSUO x10 ea  4\" LSUO x10 ea    Tiltboard ML and PA x2 ea (middle level)     Ambulating with SPC in R and L UE   -improved balance with cane in the left UE  6\" step ups x10 bilat  Tiltboard taps AP and ML 60 ea   Therapeutic Exercise Minutes 25 28 20 28   Neuro Re-Educ Minutes 18 15 25 12   Total Time Of Timed Procedures 43 43 45 40   Total Time Of Service-Based Procedures 0 0 0 0   Total Treatment Time 43 43 45 40   HEP  - Full Leg Press   - Lateral Step Ups  - 2 sets - 10 reps  - Step Up  - 7 x weekly - 2 sets - 10 reps  - Standing Ankle Supination Pronation on Rocker Board with Counter Support  - 30 reps  - Standing Ankle Plantar Flexion Dorsiflexion on Rocker Board with Unilateral Counter Support  - 30 reps Glute sets         HEP  Glute sets    Charges  2 TE 1 NMR

## 2025-07-17 ENCOUNTER — APPOINTMENT (OUTPATIENT)
Dept: PHYSICAL THERAPY | Facility: HOSPITAL | Age: 80
End: 2025-07-17
Attending: OTOLARYNGOLOGY
Payer: MEDICARE

## 2025-07-24 ENCOUNTER — APPOINTMENT (OUTPATIENT)
Dept: PHYSICAL THERAPY | Facility: HOSPITAL | Age: 80
End: 2025-07-24
Attending: OTOLARYNGOLOGY
Payer: MEDICARE

## 2025-07-29 ENCOUNTER — OFFICE VISIT (OUTPATIENT)
Dept: PHYSICAL THERAPY | Facility: HOSPITAL | Age: 80
End: 2025-07-29
Attending: OTOLARYNGOLOGY
Payer: MEDICARE

## 2025-07-29 PROCEDURE — 97112 NEUROMUSCULAR REEDUCATION: CPT

## (undated) DEVICE — INTENDED TO BE USED TO OCCLUDE, RETRACT AND IDENTIFY ARTERIES, VEINS, TENDONS AND NERVES IN SURGICAL PROCEDURES: Brand: STERION®  VESSEL LOOP

## (undated) DEVICE — DRAPE,U/SHT,SPLIT,FILM,60X84,STERILE: Brand: MEDLINE

## (undated) DEVICE — DISPOSABLE TOURNIQUET CUFF SINGLE BLADDER, DUAL PORT AND QUICK CONNECT CONNECTOR: Brand: COLOR CUFF

## (undated) DEVICE — GLOVE SUR 8 SENSICARE PI PIP GRN PWD F

## (undated) DEVICE — UPPER EXTREMITY CDS-LF: Brand: MEDLINE INDUSTRIES, INC.

## (undated) DEVICE — PRECISION (7.0 X 0.51 X 29.5MM)

## (undated) DEVICE — GLOVE SUR 6.5 SENSICARE PI PIP GRN PWD F

## (undated) DEVICE — GLOVE SUR 8 SENSICARE PI PIP CRM PWD F

## (undated) DEVICE — 3M™ IOBAN™ 2 ANTIMICROBIAL INCISE DRAPE 6650EZ: Brand: IOBAN™ 2

## (undated) DEVICE — BANDAGE COHESIVE W4INXL5YD TAN E POR SLF ADH

## (undated) DEVICE — UNDYED BRAIDED (POLYGLACTIN 910), SYNTHETIC ABSORBABLE SUTURE: Brand: COATED VICRYL

## (undated) DEVICE — GAUZE,SPONGE,FLUFF,6"X6.75",STRL,5/TRAY: Brand: MEDLINE

## (undated) DEVICE — BANDAGE,GAUZE,CONFORMING,3X4.1Y,STRL,LF: Brand: MEDLINE

## (undated) DEVICE — HOOK LOCK LATEX FREE ELASTIC BANDAGE 4INX5YD

## (undated) DEVICE — ADHESIVE LIQ 2/3ML VI MASTISOL

## (undated) DEVICE — GOWN SUR 2XL LEV 4 BLU W/ WHT V NK BND AERO

## (undated) DEVICE — C-ARM: Brand: UNBRANDED

## (undated) DEVICE — SLEEVE COMPR M KNEE LEN SGL USE KENDALL SCD

## (undated) DEVICE — DRAPE,TOWEL,LARGE,INVISISHIELD: Brand: MEDLINE

## (undated) DEVICE — DISPOSABLE BIPOLAR FORCEPS 4" (10.2CM) JEWELERS, STRAIGHT 0.4MM TIP AND 12 FT. (3.6M) CABLE: Brand: KIRWAN

## (undated) DEVICE — GLOVE SUR 6.5 SENSICARE PI PIP CRM PWD F

## (undated) DEVICE — SUT VCRL 0 27IN CP-2 ABSRB UD L26MM 1/2 CIR

## (undated) DEVICE — Device: Brand: JELCO

## (undated) DEVICE — #15 STERILE STAINLESS BLADE: Brand: STERILE STAINLESS BLADES

## (undated) DEVICE — SOLUTION IRRIG 1000ML 0.9% NACL USP BTL

## (undated) NOTE — LETTER
AUTHORIZATION FOR SURGICAL OPERATION OR OTHER PROCEDURE    1. I hereby authorize Dr. Kenya Macias, and [facility] staff assigned to my case to perform the following operation and/or procedure at the [facility] site:    _______________________________________________________________________________________________      _______________________________________________________________________________________________    2.  My physician has explained the nature and purpose of the operation or other procedure, possible alternative methods of treatment, the risks involved, and the possibility of complication to me.  I acknowledge that no guarantee has been made as to the result that may be obtained.  3.  I recognize that, during the course of this operation, or other procedure, unforseen conditions may necessitate additional or different procedure than those listed above.  I, therefore, further authorize and request that the above named physician, his/her physician assistants or designees perform such procedures as are, in his/her professional opinion, necessary and desirable.  4.  Any tissue or organs removed in the operation or other procedure may be disposed of by and at the discretion of the [facility] and [facility].  5.  I understand that in the event of a medical emergency, I will be transported by local paramedics to [facility] or other hospital emergency department.  6.  I certify that I have read and fully understand the above consent to operation and/or other procedure.    7.  I acknowledge that my physician has explained sedation/analgesia administration to me including the risks and benefits.  I consent to the administration of sedation/analgesia as may be necessary or desirable in the judgement of my physician.    Witness signature: ___________________________________________________ Date:  ______/______/_____                    Time:  ________  A.M.  P.M.       Patient Name:  ______________________________________________________  (please print)      Patient signature:  ___________________________________________________             Relationship to Patient:           []  Parent    Responsible person                          []  Spouse  In case of minor or                    [] Other  _____________   Incompetent name:  __________________________________________________                               (please print)      _____________      Responsible person  In case of minor or  Incompetent signature:  _______________________________________________    Statement of Physician  My signature below affirms that prior to the time of the procedure, I have explained to the patient and/or his/her guardian, the risks and benefits involved in the proposed treatment and any reasonable alternative to the proposed treatment.  I have also explained the risks and benefits involved in the refusal of the proposed treatment and have answered the patient's questions.                        Date:  ______/______/_______  Provider                      Signature:  __________________________________________________________       Time:  ___________ A.M    P.M.

## (undated) NOTE — LETTER
05/13/21        Tanner [address] Mastache,    Nuestros registros indican que tiene análisis clínicos pendientes y/o pruebas que se ordenaron en nowak nombre que no se armenta completado.  [employer]

## (undated) NOTE — LETTER
To:  MONAE Hoover      Date:  3/1/2024  Patient Name: Jesi Marsh-Age / Sex: 1945-A: 78 y  female  Medical Records: [id number]   CSN: [id number]      Clearance for Surgery Requested by Surgeon    Request for:  Medical Clearance    Requested by Surgeon: Dr Piña    Surgical Date: 3/5/2024    Procedure: RIGHT ELBOW OPEN REDUCTION INTERNAL FIXATION OLECRANON FRACTURE., Right      Please fax the clearance note to the Pre-Admission Testing department.  Thank you.

## (undated) NOTE — ED AVS SNAPSHOT
Althea Owusu   MRN: [id number]    Department:  [facility] Emergency Department   Date of Visit:  7/9/2018           Disclosure     Insurance plans vary and the physician(s) referred by the ER may not be covered by your plan.  Please co within the next three months to obtain basic health screening including reassessment of your blood pressure.     IF THERE IS ANY CHANGE OR WORSENING OF YOUR CONDITION, CALL YOUR PRIMARY CARE PHYSICIAN AT ONCE OR RETURN IMMEDIATELY TO THE EMERGENCY DEPARTMEN

## (undated) NOTE — MR AVS SNAPSHOT
[address] at [facility]  [address]  [facility], [address] [address]               Thank you for choosing us for your health care visit with Orestes Iyer MD.  We are glad to serve you and happy to fabby [facility] [address] [address]     Phone:  [phone]    - gabapentin 400 MG Caps      These medications were sent to [address] [place] [address] [address] [phone], [phone]  [address] [place] [address], [address] [place] [address]     Phone:  [phone] 2 ½ hours per week – spread out over time Use a rashard to keep you motivated   Don’t forget strength training with weights and resistance Set goals and track your progress   You don’t need to join a gym. Home exercises work great.  Put more priority on exe

## (undated) NOTE — LETTER
AUTHORIZATION FOR SURGICAL OPERATION OR OTHER PROCEDURE    1. I hereby authorize Dr. Kenya Macias, and [facility] staff assigned to my case to perform the following operation and/or procedure at the [facility] site:    _______________________________________________________________________________________________      _______________________________________________________________________________________________    2.  My physician has explained the nature and purpose of the operation or other procedure, possible alternative methods of treatment, the risks involved, and the possibility of complication to me.  I acknowledge that no guarantee has been made as to the result that may be obtained.  3.  I recognize that, during the course of this operation, or other procedure, unforseen conditions may necessitate additional or different procedure than those listed above.  I, therefore, further authorize and request that the above named physician, his/her physician assistants or designees perform such procedures as are, in his/her professional opinion, necessary and desirable.  4.  Any tissue or organs removed in the operation or other procedure may be disposed of by and at the discretion of the [facility] and [facility].  5.  I understand that in the event of a medical emergency, I will be transported by local paramedics to [facility] or other hospital emergency department.  6.  I certify that I have read and fully understand the above consent to operation and/or other procedure.    7.  I acknowledge that my physician has explained sedation/analgesia administration to me including the risks and benefits.  I consent to the administration of sedation/analgesia as may be necessary or desirable in the judgement of my physician.    Witness signature: ___________________________________________________ Date:  ______/______/_____                    Time:  ________  A.M.  P.M.       Patient Name:  ______________________________________________________  (please print)      Patient signature:  ___________________________________________________             Relationship to Patient:           []  Parent    Responsible person                          []  Spouse  In case of minor or                    [] Other  _____________   Incompetent name:  __________________________________________________                               (please print)      _____________      Responsible person  In case of minor or  Incompetent signature:  _______________________________________________    Statement of Physician  My signature below affirms that prior to the time of the procedure, I have explained to the patient and/or his/her guardian, the risks and benefits involved in the proposed treatment and any reasonable alternative to the proposed treatment.  I have also explained the risks and benefits involved in the refusal of the proposed treatment and have answered the patient's questions.                        Date:  ______/______/_______  Provider                      Signature:  __________________________________________________________       Time:  ___________ A.M    P.M.

## (undated) NOTE — LETTER
AUTHORIZATION FOR SURGICAL OPERATION OR OTHER PROCEDURE    1. I hereby authorize Dr. Kenya Macias, and [facility] staff assigned to my case to perform the following operation and/or procedure at the [facility] site:    _______________________________________________________________________________________________      _______________________________________________________________________________________________    2.  My physician has explained the nature and purpose of the operation or other procedure, possible alternative methods of treatment, the risks involved, and the possibility of complication to me.  I acknowledge that no guarantee has been made as to the result that may be obtained.  3.  I recognize that, during the course of this operation, or other procedure, unforseen conditions may necessitate additional or different procedure than those listed above.  I, therefore, further authorize and request that the above named physician, his/her physician assistants or designees perform such procedures as are, in his/her professional opinion, necessary and desirable.  4.  Any tissue or organs removed in the operation or other procedure may be disposed of by and at the discretion of the [facility] and [facility].  5.  I understand that in the event of a medical emergency, I will be transported by local paramedics to [facility] or other hospital emergency department.  6.  I certify that I have read and fully understand the above consent to operation and/or other procedure.    7.  I acknowledge that my physician has explained sedation/analgesia administration to me including the risks and benefits.  I consent to the administration of sedation/analgesia as may be necessary or desirable in the judgement of my physician.    Witness signature: ___________________________________________________ Date:  ______/______/_____                    Time:  ________  A.M.  P.M.       Patient Name:  ______________________________________________________  (please print)      Patient signature:  ___________________________________________________             Relationship to Patient:           []  Parent    Responsible person                          []  Spouse  In case of minor or                    [] Other  _____________   Incompetent name:  __________________________________________________                               (please print)      _____________      Responsible person  In case of minor or  Incompetent signature:  _______________________________________________    Statement of Physician  My signature below affirms that prior to the time of the procedure, I have explained to the patient and/or his/her guardian, the risks and benefits involved in the proposed treatment and any reasonable alternative to the proposed treatment.  I have also explained the risks and benefits involved in the refusal of the proposed treatment and have answered the patient's questions.                        Date:  ______/______/_______  Provider                      Signature:  __________________________________________________________       Time:  ___________ A.M    P.M.

## (undated) NOTE — LETTER
AUTHORIZATION FOR SURGICAL OPERATION OR OTHER PROCEDURE    1. I hereby authorize Dr. Kenya Macias, and [facility] staff assigned to my case to perform the following operation and/or procedure at the [facility] site:    _______________________________________________________________________________________________      _______________________________________________________________________________________________    2.  My physician has explained the nature and purpose of the operation or other procedure, possible alternative methods of treatment, the risks involved, and the possibility of complication to me.  I acknowledge that no guarantee has been made as to the result that may be obtained.  3.  I recognize that, during the course of this operation, or other procedure, unforseen conditions may necessitate additional or different procedure than those listed above.  I, therefore, further authorize and request that the above named physician, his/her physician assistants or designees perform such procedures as are, in his/her professional opinion, necessary and desirable.  4.  Any tissue or organs removed in the operation or other procedure may be disposed of by and at the discretion of the [facility] and [facility].  5.  I understand that in the event of a medical emergency, I will be transported by local paramedics to [facility] or other hospital emergency department.  6.  I certify that I have read and fully understand the above consent to operation and/or other procedure.    7.  I acknowledge that my physician has explained sedation/analgesia administration to me including the risks and benefits.  I consent to the administration of sedation/analgesia as may be necessary or desirable in the judgement of my physician.    Witness signature: ___________________________________________________ Date:  ______/______/_____                    Time:  ________  A.M.  P.M.       Patient Name:  ______________________________________________________  (please print)      Patient signature:  ___________________________________________________             Relationship to Patient:           []  Parent    Responsible person                          []  Spouse  In case of minor or                    [] Other  _____________   Incompetent name:  __________________________________________________                               (please print)      _____________      Responsible person  In case of minor or  Incompetent signature:  _______________________________________________    Statement of Physician  My signature below affirms that prior to the time of the procedure, I have explained to the patient and/or his/her guardian, the risks and benefits involved in the proposed treatment and any reasonable alternative to the proposed treatment.  I have also explained the risks and benefits involved in the refusal of the proposed treatment and have answered the patient's questions.                        Date:  ______/______/_______  Provider                      Signature:  __________________________________________________________       Time:  ___________ A.M    P.M.

## (undated) NOTE — MR AVS SNAPSHOT
[address] at [facility]  [address], [address]               Thank you for choosing us for your health care visit with [facility] 8 MP NURSE.   We are glad to serve you and happy to provide If you have questions, you can call [phone] to talk to our [facility] Staff. Remember, [email or web address] is NOT to be used for urgent needs. For medical emergencies, dial 911. Visit [email or web address] to learn more.            Visit [facility]

## (undated) NOTE — LETTER
08/26/19        Blanka Ally [place]  [address],    Nuestros registros indican que tiene pruebas (ultrasonido pelvico) pendiente que se ordenaron en nowak nombre que no se armenta completado.     Pa

## (undated) NOTE — MR AVS SNAPSHOT
[address] at [facility]  [address]  [facility], [address]                Thank you for choosing us for your health care visit with Kenny Robert MD.  We are glad to serve you and happy to fabby may be held responsible for payment in full if proper authorization is not acquired. Please contact the Patient Business Office at [phone] if you have any questions related to insurance coverage. Thank you.          Tuesday May 02, 2017     Imaging: your insurance carrier before scheduling to verify coverage.    PREVENTATIVE SERVICES  INDICATIONS AND SCHEDULE Internal Lab or Procedure External Lab or Procedure   Diabetes Screening      HbgA1C    At Least  Annually for Diabetics No results found for: A1 this or any previous visit. No flowsheet data found. Fecal Occult Blood   Covered Annually No results found for: FOB, OCCULTSTOOL No flowsheet data found.      Barium Enema-   uncomfortable but covered  Covered but uncomfortable   Glaucoma Screening No orders found for this or any previous visit.  Medium/high risk factors:   End-stage renal disease   Hemophiliacs who received Factor VIII or IX concentrates   Clients of institutions for the mentally retarded   Persons who live in the same house as a He Take 1 capsule (400 mg total) by mouth nightly. Commonly known as:  NEURONTIN           latanoprost 0.005 % Soln   Place 1 drop into both eyes nightly.    Commonly known as:  XALATAN           lisinopril 30 MG Tabs   Take 1 tablet (30 mg total) by mouth d ? Keep floors clear of clutter. ? Make sure carpets and area rugs have skid proof backing. ? Do not use slippery wax on bare floors. ? Keep furniture in its accustomed place. ? If you have pets, be careful that you don’t trip over them.     OUTSIDE SAF

## (undated) NOTE — LETTER
URGENT- SURGERY SCHEDULED 3/5/2024    OUTSIDE TESTING RESULT REQUEST     IMPORTANT: FOR YOUR IMMEDIATE ATTENTION  Please FAX all test results listed below to: [phone]     Testing already done on or about: 24           Patient Name: Jesi Marsh  Surgery Date: 3/5/2024  Medical Record: [id number]  CSN: [id number]  : 1945 - A: 78 y     Sex: female  Surgeon(s):  Monica Piña MD  Procedure: RIGHT ELBOW OPEN REDUCTION INTERNAL FIXATION OLECRANON FRACTURE.  Anesthesia Type: General     Surgeon: Monica Piña MD     The following Testing and Time Line are REQUIRED PER ANESTHESIA     EKG READ AND SIGNED WITHIN   90 days      Thank You,   Sent by:Lucretia

## (undated) NOTE — LETTER
AUTHORIZATION FOR SURGICAL OPERATION OR OTHER PROCEDURE    1. I hereby authorize Dr. Chano Plascencia, and [facility] staff assigned to my case to perform the following operation and/or procedure at the [facility] site:    ____________________Acupuncture___________________________________________________________________      _______________________________________________________________________________________________    2.  My physician has explained the nature and purpose of the operation or other procedure, possible alternative methods of treatment, the risks involved, and the possibility of complication to me.  I acknowledge that no guarantee has been made as to the result that may be obtained.  3.  I recognize that, during the course of this operation, or other procedure, unforseen conditions may necessitate additional or different procedure than those listed above.  I, therefore, further authorize and request that the above named physician, his/her physician assistants or designees perform such procedures as are, in his/her professional opinion, necessary and desirable.  4.  Any tissue or organs removed in the operation or other procedure may be disposed of by and at the discretion of the [facility] and [facility].  5.  I understand that in the event of a medical emergency, I will be transported by local paramedics to [facility] or other hospital emergency department.  6.  I certify that I have read and fully understand the above consent to operation and/or other procedure.    7.  I acknowledge that my physician has explained sedation/analgesia administration to me including the risks and benefits.  I consent to the administration of sedation/analgesia as may be necessary or desirable in the judgement of my physician.    Witness signature: ___________________________________________________ Date:  ______/______/_____                    Time:   ________ A.M.  P.M.       Patient Name:  ______________________________________________________  (please print)      Patient signature:  ___________________________________________________             Relationship to Patient:           []  Parent    Responsible person                          []  Spouse  In case of minor or                    [] Other  _____________   Incompetent name:  __________________________________________________                               (please print)      _____________      Responsible person  In case of minor or  Incompetent signature:  _______________________________________________    Statement of Physician  My signature below affirms that prior to the time of the procedure, I have explained to the patient and/or his/her guardian, the risks and benefits involved in the proposed treatment and any reasonable alternative to the proposed treatment.  I have also explained the risks and benefits involved in the refusal of the proposed treatment and have answered the patient's questions.                        Date:  ______/______/_______  Provider                      Signature:  __________________________________________________________       Time:  ___________ A.M    P.M.

## (undated) NOTE — MR AVS SNAPSHOT
[address] at [facility]  [address], [address]                Thank you for choosing us for your health care visit with Quiana Noyola MD.  We are glad to serve you and happy to fabby recent med list.                Alendronate Sodium 10 MG Tabs   Take 1 tablet (10 mg total) by mouth every morning before breakfast.   Commonly known as:  FOSAMAX           Ciprofloxacin HCl 250 MG Tabs   Take 1 tablet (250 mg total) by mouth 2 (two) times

## (undated) NOTE — LETTER
AUTHORIZATION FOR SURGICAL OPERATION OR OTHER PROCEDURE    1. I hereby authorize Dr. Kenya Plascencia, and [facility] staff assigned to my case to perform the following operation and/or procedure at the [facility] site:    __________________________________________acupuncture___________________________________________    2.  My physician has explained the nature and purpose of the operation or other procedure, possible alternative methods of treatment, the risks involved, and the possibility of complication to me.  I acknowledge that no guarantee has been made as to the result that may be obtained.  3.  I recognize that, during the course of this operation, or other procedure, unforseen conditions may necessitate additional or different procedure than those listed above.  I, therefore, further authorize and request that the above named physician, his/her physician assistants or designees perform such procedures as are, in his/her professional opinion, necessary and desirable.  4.  Any tissue or organs removed in the operation or other procedure may be disposed of by and at the discretion of the [facility] and [facility].  5.  I understand that in the event of a medical emergency, I will be transported by local paramedics to [facility] or other hospital emergency department.  6.  I certify that I have read and fully understand the above consent to operation and/or other procedure.    7.  I acknowledge that my physician has explained sedation/analgesia administration to me including the risks and benefits.  I consent to the administration of sedation/analgesia as may be necessary or desirable in the judgement of my physician.    Witness signature: ___________________________________________________ Date:  ______/______/_____                    Time:  ________ A.M.  P.M.       Patient Name:  ______________________________________________________  (please  print)      Patient signature:  ___________________________________________________             Relationship to Patient:           []  Parent    Responsible person                          []  Spouse  In case of minor or                    [] Other  _____________   Incompetent name:  __________________________________________________                               (please print)      _____________      Responsible person  In case of minor or  Incompetent signature:  _______________________________________________    Statement of Physician  My signature below affirms that prior to the time of the procedure, I have explained to the patient and/or his/her guardian, the risks and benefits involved in the proposed treatment and any reasonable alternative to the proposed treatment.  I have also explained the risks and benefits involved in the refusal of the proposed treatment and have answered the patient's questions.                        Date:  ______/______/_______  Provider                      Signature:  __________________________________________________________       Time:  ___________ A.M    P.M.

## (undated) NOTE — LETTER
[address] [place], [address]  [address]  [address] [facility] [address] [facility]       11/06/18        Patient: Leodan Kim   YOB: 1945   Date of Visit: 11/6/2018       Dear  Dr. Zena Hyman MD,      Thank you for referring Leodan Kim

## (undated) NOTE — LETTER
AUTHORIZATION FOR SURGICAL OPERATION OR OTHER PROCEDURE    1. I hereby authorize Dr. Kenya Macias, and [facility] staff assigned to my case to perform the following operation and/or procedure at the [facility] site:    _______________________________________________________________________________________________      _______________________________________________________________________________________________    2.  My physician has explained the nature and purpose of the operation or other procedure, possible alternative methods of treatment, the risks involved, and the possibility of complication to me.  I acknowledge that no guarantee has been made as to the result that may be obtained.  3.  I recognize that, during the course of this operation, or other procedure, unforseen conditions may necessitate additional or different procedure than those listed above.  I, therefore, further authorize and request that the above named physician, his/her physician assistants or designees perform such procedures as are, in his/her professional opinion, necessary and desirable.  4.  Any tissue or organs removed in the operation or other procedure may be disposed of by and at the discretion of the [facility] and [facility].  5.  I understand that in the event of a medical emergency, I will be transported by local paramedics to [facility] or other hospital emergency department.  6.  I certify that I have read and fully understand the above consent to operation and/or other procedure.    7.  I acknowledge that my physician has explained sedation/analgesia administration to me including the risks and benefits.  I consent to the administration of sedation/analgesia as may be necessary or desirable in the judgement of my physician.    Witness signature: ___________________________________________________ Date:  ______/______/_____                    Time:  ________  A.M.  P.M.       Patient Name:  ______________________________________________________  (please print)      Patient signature:  ___________________________________________________             Relationship to Patient:           []  Parent    Responsible person                          []  Spouse  In case of minor or                    [] Other  _____________   Incompetent name:  __________________________________________________                               (please print)      _____________      Responsible person  In case of minor or  Incompetent signature:  _______________________________________________    Statement of Physician  My signature below affirms that prior to the time of the procedure, I have explained to the patient and/or his/her guardian, the risks and benefits involved in the proposed treatment and any reasonable alternative to the proposed treatment.  I have also explained the risks and benefits involved in the refusal of the proposed treatment and have answered the patient's questions.                        Date:  ______/______/_______  Provider                      Signature:  __________________________________________________________       Time:  ___________ A.M    P.M.

## (undated) NOTE — LETTER
AUTHORIZATION FOR SURGICAL OPERATION OR OTHER PROCEDURE    1. I hereby authorize Dr. Kenya Plascencia, and [facility] staff assigned to my case to perform the following operation and/or procedure at the [facility] site:    __________________________________________acupuncture____________________________________________    2.  My physician has explained the nature and purpose of the operation or other procedure, possible alternative methods of treatment, the risks involved, and the possibility of complication to me.  I acknowledge that no guarantee has been made as to the result that may be obtained.  3.  I recognize that, during the course of this operation, or other procedure, unforseen conditions may necessitate additional or different procedure than those listed above.  I, therefore, further authorize and request that the above named physician, his/her physician assistants or designees perform such procedures as are, in his/her professional opinion, necessary and desirable.  4.  Any tissue or organs removed in the operation or other procedure may be disposed of by and at the discretion of the [facility] and [facility].  5.  I understand that in the event of a medical emergency, I will be transported by local paramedics to [facility] or other hospital emergency department.  6.  I certify that I have read and fully understand the above consent to operation and/or other procedure.    7.  I acknowledge that my physician has explained sedation/analgesia administration to me including the risks and benefits.  I consent to the administration of sedation/analgesia as may be necessary or desirable in the judgement of my physician.    Witness signature: ___________________________________________________ Date:  ______/______/_____                    Time:  ________ A.M.  P.M.       Patient Name:  ______________________________________________________  (please  print)      Patient signature:  ___________________________________________________             Relationship to Patient:           []  Parent    Responsible person                          []  Spouse  In case of minor or                    [] Other  _____________   Incompetent name:  __________________________________________________                               (please print)      _____________      Responsible person  In case of minor or  Incompetent signature:  _______________________________________________    Statement of Physician  My signature below affirms that prior to the time of the procedure, I have explained to the patient and/or his/her guardian, the risks and benefits involved in the proposed treatment and any reasonable alternative to the proposed treatment.  I have also explained the risks and benefits involved in the refusal of the proposed treatment and have answered the patient's questions.                        Date:  ______/______/_______  Provider                      Signature:  __________________________________________________________       Time:  ___________ A.M    P.M.

## (undated) NOTE — LETTER
5/21/2021              [facility]bl[facility] Corneliusnarciso Ashford        [address] [place] [address],  [address]         Dear Misbah Charles,      It was a pleasure to see you at our [place], [place] office.   Your lab tests were normal.  There is no

## (undated) NOTE — Clinical Note
Please call Farrah at [facility] for [facility]. Per pt she had both vaccines administered there.  Please update me

## (undated) NOTE — LETTER
24    Orthopedic Surgery   Pre-Operative Clearance Request    Patient Name:   Jesi Ashford             :   1945    Surgeon: Dr. Piña             Date of Surgery: 3/5/24    Surgical Procedure: RIGHT ELBOW OPEN REDUCTION INTERNAL FIXATION OLECRANON FRACTURE.      MUST COMPLETE ALL OF THE FOLLOWING 2-3 WEEKS PRIOR TO YOUR SURGERY TO AVOID CANCELLATION, DUE TO THE RULE THEIR WILL BE NO EXCEPTIONS!      [x]  History and Physical        [x]  Medical  Clearance                     Required pre-op testing to be ordered: N/A                             **Please fax test results, H&P, and clearance to [phone] and to [facility] at [phone]**